# Patient Record
Sex: FEMALE | Race: WHITE | Employment: UNEMPLOYED | URBAN - NONMETROPOLITAN AREA
[De-identification: names, ages, dates, MRNs, and addresses within clinical notes are randomized per-mention and may not be internally consistent; named-entity substitution may affect disease eponyms.]

---

## 2017-02-23 ENCOUNTER — OFFICE VISIT - GICH (OUTPATIENT)
Dept: FAMILY MEDICINE | Facility: OTHER | Age: 57
End: 2017-02-23

## 2017-02-23 ENCOUNTER — HOSPITAL ENCOUNTER (OUTPATIENT)
Dept: RADIOLOGY | Facility: OTHER | Age: 57
End: 2017-02-23
Attending: FAMILY MEDICINE

## 2017-02-23 ENCOUNTER — HISTORY (OUTPATIENT)
Dept: FAMILY MEDICINE | Facility: OTHER | Age: 57
End: 2017-02-23

## 2017-02-23 DIAGNOSIS — J44.9 CHRONIC OBSTRUCTIVE PULMONARY DISEASE (H): ICD-10-CM

## 2017-02-23 DIAGNOSIS — Z72.0 TOBACCO USE: ICD-10-CM

## 2017-02-23 DIAGNOSIS — R05.9 COUGH: ICD-10-CM

## 2017-02-23 DIAGNOSIS — J18.9 PNEUMONIA: ICD-10-CM

## 2017-03-01 ENCOUNTER — COMMUNICATION - GICH (OUTPATIENT)
Dept: FAMILY MEDICINE | Facility: OTHER | Age: 57
End: 2017-03-01

## 2017-03-01 DIAGNOSIS — J44.9 CHRONIC OBSTRUCTIVE PULMONARY DISEASE (H): ICD-10-CM

## 2017-03-01 DIAGNOSIS — M06.9 RHEUMATOID ARTHRITIS (H): ICD-10-CM

## 2017-03-02 ENCOUNTER — COMMUNICATION - GICH (OUTPATIENT)
Dept: FAMILY MEDICINE | Facility: OTHER | Age: 57
End: 2017-03-02

## 2017-03-13 ENCOUNTER — OFFICE VISIT - GICH (OUTPATIENT)
Dept: FAMILY MEDICINE | Facility: OTHER | Age: 57
End: 2017-03-13

## 2017-03-13 ENCOUNTER — HOSPITAL ENCOUNTER (OUTPATIENT)
Dept: RADIOLOGY | Facility: OTHER | Age: 57
End: 2017-03-13
Attending: FAMILY MEDICINE

## 2017-03-13 DIAGNOSIS — R07.89 OTHER CHEST PAIN: ICD-10-CM

## 2017-03-13 DIAGNOSIS — R10.11 RIGHT UPPER QUADRANT PAIN: ICD-10-CM

## 2017-03-13 DIAGNOSIS — K22.70 BARRETT'S ESOPHAGUS WITHOUT DYSPLASIA: ICD-10-CM

## 2017-03-13 LAB
A/G RATIO - HISTORICAL: 1.3 (ref 1–2)
ABSOLUTE BASOPHILS - HISTORICAL: 0 THOU/CU MM
ABSOLUTE EOSINOPHILS - HISTORICAL: 0.1 THOU/CU MM
ABSOLUTE LYMPHOCYTES - HISTORICAL: 1.8 THOU/CU MM (ref 0.9–2.9)
ABSOLUTE MONOCYTES - HISTORICAL: 0.5 THOU/CU MM
ABSOLUTE NEUTROPHILS - HISTORICAL: 3.8 THOU/CU MM (ref 1.7–7)
ALBUMIN SERPL-MCNC: 3.9 G/DL (ref 3.5–5.7)
ALP SERPL-CCNC: 59 IU/L (ref 34–104)
ALT (SGPT) - HISTORICAL: 19 IU/L (ref 7–52)
ANION GAP - HISTORICAL: 9 (ref 5–18)
AST SERPL-CCNC: 17 IU/L (ref 13–39)
BASOPHILS # BLD AUTO: 0.7 %
BILIRUB SERPL-MCNC: 0.3 MG/DL (ref 0.3–1)
BUN SERPL-MCNC: 15 MG/DL (ref 7–25)
BUN/CREAT RATIO - HISTORICAL: 15
CALCIUM SERPL-MCNC: 9.6 MG/DL (ref 8.6–10.3)
CHLORIDE SERPLBLD-SCNC: 106 MMOL/L (ref 98–107)
CO2 SERPL-SCNC: 24 MMOL/L (ref 21–31)
CREAT SERPL-MCNC: 0.99 MG/DL (ref 0.7–1.3)
EOSINOPHIL NFR BLD AUTO: 1.4 %
ERYTHROCYTE [DISTWIDTH] IN BLOOD BY AUTOMATED COUNT: 15.1 % (ref 11.5–15.5)
GFR IF NOT AFRICAN AMERICAN - HISTORICAL: 58 ML/MIN/1.73M2
GLOBULIN - HISTORICAL: 3 G/DL (ref 2–3.7)
GLUCOSE SERPL-MCNC: 108 MG/DL (ref 70–105)
HCT VFR BLD AUTO: 37.6 % (ref 33–51)
HEMOGLOBIN: 11.8 G/DL (ref 12–16)
LYMPHOCYTES NFR BLD AUTO: 28.9 % (ref 20–44)
MCH RBC QN AUTO: 27.8 PG (ref 26–34)
MCHC RBC AUTO-ENTMCNC: 31.4 G/DL (ref 32–36)
MCV RBC AUTO: 89 FL (ref 80–100)
MONOCYTES NFR BLD AUTO: 7.6 %
NEUTROPHILS NFR BLD AUTO: 61.4 % (ref 42–72)
PLATELET # BLD AUTO: 231 THOU/CU MM (ref 140–440)
PMV BLD: 6.1 FL (ref 6.5–11)
POTASSIUM SERPL-SCNC: 3.7 MMOL/L (ref 3.5–5.1)
PROT SERPL-MCNC: 6.9 G/DL (ref 6.4–8.9)
RED BLOOD COUNT - HISTORICAL: 4.25 MIL/CU MM (ref 4–5.2)
SODIUM SERPL-SCNC: 139 MMOL/L (ref 133–143)
WHITE BLOOD COUNT - HISTORICAL: 6.2 THOU/CU MM (ref 4.5–11)

## 2017-03-17 ENCOUNTER — HOSPITAL ENCOUNTER (OUTPATIENT)
Dept: RADIOLOGY | Facility: OTHER | Age: 57
End: 2017-03-17
Attending: FAMILY MEDICINE

## 2017-03-17 ENCOUNTER — AMBULATORY - GICH (OUTPATIENT)
Dept: FAMILY MEDICINE | Facility: OTHER | Age: 57
End: 2017-03-17

## 2017-03-17 ENCOUNTER — HISTORY (OUTPATIENT)
Dept: EMERGENCY MEDICINE | Facility: OTHER | Age: 57
End: 2017-03-17

## 2017-03-17 DIAGNOSIS — R10.11 RIGHT UPPER QUADRANT PAIN: ICD-10-CM

## 2017-03-23 ENCOUNTER — OFFICE VISIT - GICH (OUTPATIENT)
Dept: FAMILY MEDICINE | Facility: OTHER | Age: 57
End: 2017-03-23

## 2017-03-23 ENCOUNTER — HISTORY (OUTPATIENT)
Dept: FAMILY MEDICINE | Facility: OTHER | Age: 57
End: 2017-03-23

## 2017-03-23 DIAGNOSIS — S39.012A STRAIN OF MUSCLE, FASCIA AND TENDON OF LOWER BACK, INITIAL ENCOUNTER: ICD-10-CM

## 2017-03-23 DIAGNOSIS — M54.50 LOW BACK PAIN: ICD-10-CM

## 2017-03-30 ENCOUNTER — COMMUNICATION - GICH (OUTPATIENT)
Dept: FAMILY MEDICINE | Facility: OTHER | Age: 57
End: 2017-03-30

## 2017-03-30 DIAGNOSIS — M54.50 LOW BACK PAIN: ICD-10-CM

## 2017-03-30 DIAGNOSIS — S39.012A STRAIN OF MUSCLE, FASCIA AND TENDON OF LOWER BACK, INITIAL ENCOUNTER: ICD-10-CM

## 2017-04-07 ENCOUNTER — COMMUNICATION - GICH (OUTPATIENT)
Dept: FAMILY MEDICINE | Facility: OTHER | Age: 57
End: 2017-04-07

## 2017-04-07 DIAGNOSIS — S39.012A STRAIN OF MUSCLE, FASCIA AND TENDON OF LOWER BACK, INITIAL ENCOUNTER: ICD-10-CM

## 2017-04-07 DIAGNOSIS — M54.50 LOW BACK PAIN: ICD-10-CM

## 2017-04-11 ENCOUNTER — HISTORY (OUTPATIENT)
Dept: EMERGENCY MEDICINE | Facility: OTHER | Age: 57
End: 2017-04-11

## 2017-04-11 ENCOUNTER — HISTORY (OUTPATIENT)
Dept: FAMILY MEDICINE | Facility: OTHER | Age: 57
End: 2017-04-11

## 2017-04-11 ENCOUNTER — OFFICE VISIT - GICH (OUTPATIENT)
Dept: FAMILY MEDICINE | Facility: OTHER | Age: 57
End: 2017-04-11

## 2017-04-26 ENCOUNTER — AMBULATORY - GICH (OUTPATIENT)
Dept: RADIOLOGY | Facility: OTHER | Age: 57
End: 2017-04-26

## 2017-04-26 DIAGNOSIS — Z12.31 ENCOUNTER FOR SCREENING MAMMOGRAM FOR MALIGNANT NEOPLASM OF BREAST: ICD-10-CM

## 2017-06-06 ENCOUNTER — COMMUNICATION - GICH (OUTPATIENT)
Dept: FAMILY MEDICINE | Facility: OTHER | Age: 57
End: 2017-06-06

## 2017-06-06 DIAGNOSIS — M54.50 LOW BACK PAIN: ICD-10-CM

## 2017-06-06 DIAGNOSIS — S39.012A STRAIN OF MUSCLE, FASCIA AND TENDON OF LOWER BACK, INITIAL ENCOUNTER: ICD-10-CM

## 2017-07-10 ENCOUNTER — AMBULATORY - GICH (OUTPATIENT)
Dept: RADIOLOGY | Facility: OTHER | Age: 57
End: 2017-07-10

## 2017-07-10 DIAGNOSIS — N63.0 BREAST LUMP: ICD-10-CM

## 2017-07-11 ENCOUNTER — HISTORY (OUTPATIENT)
Dept: EMERGENCY MEDICINE | Facility: OTHER | Age: 57
End: 2017-07-11

## 2017-07-12 ENCOUNTER — AMBULATORY - GICH (OUTPATIENT)
Dept: RADIOLOGY | Facility: OTHER | Age: 57
End: 2017-07-12

## 2017-07-12 DIAGNOSIS — N63.0 BREAST LUMP: ICD-10-CM

## 2017-07-13 ENCOUNTER — AMBULATORY - GICH (OUTPATIENT)
Dept: RADIOLOGY | Facility: OTHER | Age: 57
End: 2017-07-13

## 2017-07-13 ENCOUNTER — HISTORY (OUTPATIENT)
Dept: RADIOLOGY | Facility: OTHER | Age: 57
End: 2017-07-13

## 2017-07-13 ENCOUNTER — HOSPITAL ENCOUNTER (OUTPATIENT)
Dept: RADIOLOGY | Facility: OTHER | Age: 57
End: 2017-07-13

## 2017-07-13 DIAGNOSIS — N63.0 BREAST LUMP: ICD-10-CM

## 2017-07-21 ENCOUNTER — HISTORY (OUTPATIENT)
Dept: OBGYN | Facility: OTHER | Age: 57
End: 2017-07-21

## 2017-07-21 ENCOUNTER — OFFICE VISIT - GICH (OUTPATIENT)
Dept: FAMILY MEDICINE | Facility: OTHER | Age: 57
End: 2017-07-21

## 2017-07-21 ENCOUNTER — HOSPITAL ENCOUNTER (OUTPATIENT)
Dept: RADIOLOGY | Facility: OTHER | Age: 57
End: 2017-07-21
Attending: NURSE PRACTITIONER

## 2017-07-21 ENCOUNTER — OFFICE VISIT - GICH (OUTPATIENT)
Dept: OBGYN | Facility: OTHER | Age: 57
End: 2017-07-21

## 2017-07-21 DIAGNOSIS — M25.551 PAIN IN RIGHT HIP: ICD-10-CM

## 2017-07-21 DIAGNOSIS — S81.811D LACERATION WITHOUT FOREIGN BODY, RIGHT LOWER LEG, SUBSEQUENT ENCOUNTER: ICD-10-CM

## 2017-07-21 DIAGNOSIS — N95.2 POSTMENOPAUSAL ATROPHIC VAGINITIS: ICD-10-CM

## 2017-08-22 ENCOUNTER — AMBULATORY - GICH (OUTPATIENT)
Dept: RADIOLOGY | Facility: OTHER | Age: 57
End: 2017-08-22

## 2017-08-22 DIAGNOSIS — R92.8 OTHER ABNORMAL AND INCONCLUSIVE FINDINGS ON DIAGNOSTIC IMAGING OF BREAST: ICD-10-CM

## 2017-08-24 ENCOUNTER — AMBULATORY - GICH (OUTPATIENT)
Dept: RADIOLOGY | Facility: OTHER | Age: 57
End: 2017-08-24

## 2017-08-24 DIAGNOSIS — M25.552 PAIN IN LEFT HIP: ICD-10-CM

## 2017-08-24 DIAGNOSIS — M25.551 PAIN IN RIGHT HIP: ICD-10-CM

## 2017-08-24 DIAGNOSIS — G89.29 OTHER CHRONIC PAIN: ICD-10-CM

## 2017-08-24 DIAGNOSIS — M16.0 PRIMARY OSTEOARTHRITIS OF BOTH HIPS: ICD-10-CM

## 2017-10-03 ENCOUNTER — HOSPITAL ENCOUNTER (OUTPATIENT)
Dept: RADIOLOGY | Facility: OTHER | Age: 57
End: 2017-10-03

## 2017-10-03 DIAGNOSIS — M25.551 PAIN IN RIGHT HIP: ICD-10-CM

## 2017-10-03 DIAGNOSIS — G89.29 OTHER CHRONIC PAIN: ICD-10-CM

## 2017-10-03 DIAGNOSIS — R92.8 OTHER ABNORMAL AND INCONCLUSIVE FINDINGS ON DIAGNOSTIC IMAGING OF BREAST: ICD-10-CM

## 2017-10-03 DIAGNOSIS — M16.0 PRIMARY OSTEOARTHRITIS OF BOTH HIPS: ICD-10-CM

## 2017-10-17 ENCOUNTER — HOSPITAL ENCOUNTER (OUTPATIENT)
Dept: RADIOLOGY | Facility: OTHER | Age: 57
End: 2017-10-17

## 2017-10-17 DIAGNOSIS — G89.29 OTHER CHRONIC PAIN: ICD-10-CM

## 2017-10-17 DIAGNOSIS — M25.552 PAIN IN LEFT HIP: ICD-10-CM

## 2017-10-17 DIAGNOSIS — M16.0 PRIMARY OSTEOARTHRITIS OF BOTH HIPS: ICD-10-CM

## 2017-10-22 ENCOUNTER — HEALTH MAINTENANCE LETTER (OUTPATIENT)
Age: 57
End: 2017-10-22
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
## 2017-12-27 NOTE — PROGRESS NOTES
Patient Information     Patient Name MRN Sex Deidre Jimenez [id number] Female 1960      [facility] at 10/17/2017  2:21 PM     Author:  Bet[facility]T. [facility] Service:  (none) Author Type:  RadTech - Registered Radiologic Technologist     Filed:  10/17/2017  2:21 PM Date of Service:  10/17/2017  2:21 PM Status:  Signed     :  Betty Meza R.T. ([facility]) ([facility] - Registered Radiologic Technologist)            [place] Protocol    A. Pre-procedure verification complete yes  1-relevant information / documentation available, reviewed and properly matched to the patient; 2-consent accurate and complete, 3-equipment and supplies available    B. Site marking complete Yes  Site marked if not in continuous attendance with patient    C. TIME OUT completed yes  Time Out was conducted just prior to starting procedure to verify the eight required elements: 1-patient identity, 2-consent accurate and complete, 3-position, 4-correct side/site marked (if applicable), 5-procedure, 6-relevant images / results properly labeled and displayed (if applicable), 7-antibiotics / irrigation fluids (if applicable), 8-safety precautions.

## 2017-12-27 NOTE — PROGRESS NOTES
Patient Information     Patient Name MRN Sex Deidre Jimenez [id number] Female 1960      Progress Notes by Betty Davis R.T. ([facility]) at 10/3/2017 12:45 PM     Author:  [facility] Service:  (none) Author Type:  RadTech - Registered Radiologic Technologist     Filed:  10/3/2017 12:45 PM Date of Service:  10/3/2017 12:45 PM Status:  Signed     :  Bet[facility]. [facility] ([facility] - Registered Radiologic Technologist)            [place] Protocol    A. Pre-procedure verification complete yes  1-relevant information / documentation available, reviewed and properly matched to the patient; 2-consent accurate and complete, 3-equipment and supplies available    B. Site marking complete Yes  Site marked if not in continuous attendance with patient    C. TIME OUT completed yes  Time Out was conducted just prior to starting procedure to verify the eight required elements: 1-patient identity, 2-consent accurate and complete, 3-position, 4-correct side/site marked (if applicable), 5-procedure, 6-relevant images / results properly labeled and displayed (if applicable), 7-antibiotics / irrigation fluids (if applicable), 8-safety precautions.

## 2017-12-28 NOTE — PROGRESS NOTES
Patient Information     Patient Name MRN Deidre Echeverria [id number] Female 1960      Progress Notes by Betty Meza R.T. ([facility]) at 10/17/2017  2:21 PM     Author:  Betty Meza R.T. ([facility]) Service:  (none) Author Type:  RadTech - Registered Radiologic Technologist     Filed:  10/17/2017  2:21 PM Date of Service:  10/17/2017  2:21 PM Status:  Signed     :  Betty Meza R.T. ([facility]) ([facility] - Registered Radiologic Technologist)            RECOVERY TIME  You may experience numbness and/or relief of your pain for up to 4-6 hours after the injection.  Your usual symptoms may return the night of the procedure and may possible be more severe than usual a day or two following.  Please keep track of your pain over the next several days and report how long the relief lasts to the doctor who referred you for this procedure.    The beneficial effects of the steroids usually require 2 to 3 days to take effect, buy may take as long as 5 to 7 days.  If there is no change in the pain, then investigation can be focused on other possible sources of your pain.  In either case, the information is useful to the doctor who referred you for this procedure.    POSSIBLE SIDE EFFECTS  Facial flushing (redness), occasional low grade fevers of 99.5F or less, hiccups, insomnia, headaches, increased heart rate, abdominal cramping, and/or a bloating feeling are side effects of the steroid medications and will go away 3 to 4 days after the injection.    Diabetic Patients  The steroids you have received may significantly increase your blood sugar levels.  Monitor your blood sugar level closely (4-6 times per day) for a period of 4 days or until your blood sugar level normalizes.  If your blood sugar level elevates significantly or you experience confusion, dizziness, sweating, please notify our primary physician and make him/her aware that you have received steroids.
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
## 2017-12-28 NOTE — TELEPHONE ENCOUNTER
Patient Information     Patient Name MRN Deidre Echeverria [id number] Female 1960      Telephone Encounter by Corazon Meza RN at 2017 10:27 AM     Author:  Corazon Meza RN Service:  (none) Author Type:  NURS- Registered Nurse     Filed:  2017 10:28 AM Encounter Date:  2017 Status:  Signed     :  Corazon Meza RN (NURS- Registered Nurse)            This is a Refill request from: [facility]   Name of Medication: Flexeril  Quantity requested: 30  Last fill date: 3/30/17  Due for refill: yes  Last visit with USHA CORONEL was on: 2017 in [facility]  PCP:  No Pcp  Controlled Substance Agreement:  n/a   Diagnosis r/t this medication request: low back pain    Unable to complete prescription refill per RN Medication Refill Policy.................... Corazon Meaz RN ....................  2017   10:28 AM

## 2017-12-28 NOTE — PROGRESS NOTES
Patient Information     Patient Name MRN Sex Deidre Jimenez [id number] Female 1960      Progress Notes by Betty Davis R.T. ([facility]) at 10/3/2017 12:45 PM     Author:  [facility] Service:  (none) Author Type:  RadTech - Registered Radiologic Technologist     Filed:  10/3/2017 12:45 PM Date of Service:  10/3/2017 12:45 PM Status:  Signed     :  Betty Davis R.T. ([facility]) ([facility] - Registered Radiologic Technologist)            Falls Risk Criteria:    Age 65 and older or under age 4        Sensory deficits    Poor vision    Use of ambulatory aides    Impaired judgment    Unable to walk independently    Meets High Risk criteria for falls:  no

## 2017-12-28 NOTE — PROGRESS NOTES
Patient Information     Patient Name MRN Sex Deidre Jimenez [id number] Female 1960      Progress Notes by Bell Carnes R.T. ([facility]) at 2017  2:43 PM     Author:  Bell Carnes R.T. ([facility]) Service:  (none) Author Type:  (none)     Filed:  2017  2:43 PM Date of Service:  2017  2:43 PM Status:  Signed     :  Bell Carnes R.T. ([facility]) ([facility] - Registered Radiologic Technologist)            Falls Risk Criteria:    Age 65 and older or under age 4        Sensory deficits    Poor vision    Use of ambulatory aides    Impaired judgment    Unable to walk independently    Meets High Risk criteria for falls:  no

## 2017-12-28 NOTE — PROGRESS NOTES
Patient Information     Patient Name MRN Deidre Echeverria [id number] Female 1960      Progress Notes by Betty Davis R.T. ([facility]) at 10/3/2017 12:45 PM     Author:  Betty Davis R.T. ([facility]) Service:  (none) Author Type:  RadTech - Registered Radiologic Technologist     Filed:  10/3/2017 12:45 PM Date of Service:  10/3/2017 12:45 PM Status:  Signed     :  Betty Davis R.T. ([facility]) ([facility] - Registered Radiologic Technologist)            RECOVERY TIME  You may experience numbness and/or relief of your pain for up to 4-6 hours after the injection.  Your usual symptoms may return the night of the procedure and may possible be more severe than usual a day or two following.  Please keep track of your pain over the next several days and report how long the relief lasts to the doctor who referred you for this procedure.    The beneficial effects of the steroids usually require 2 to 3 days to take effect, buy may take as long as 5 to 7 days.  If there is no change in the pain, then investigation can be focused on other possible sources of your pain.  In either case, the information is useful to the doctor who referred you for this procedure.    POSSIBLE SIDE EFFECTS  Facial flushing (redness), occasional low grade fevers of 99.5F or less, hiccups, insomnia, headaches, increased heart rate, abdominal cramping, and/or a bloating feeling are side effects of the steroid medications and will go away 3 to 4 days after the injection.    Diabetic Patients  The steroids you have received may significantly increase your blood sugar levels.  Monitor your blood sugar level closely (4-6 times per day) for a period of 4 days or until your blood sugar level normalizes.  If your blood sugar level elevates significantly or you experience confusion, dizziness, sweating, please notify our primary physician and make him/her aware that you have received steroids.

## 2017-12-28 NOTE — TELEPHONE ENCOUNTER
Patient Information     Patient Name MRN Deidre Echeverria [id number] Female 1960      Telephone Encounter by Kennedy Tilley MD at 2017  8:11 PM     Author:  Kennedy Tilley MD Service:  (none) Author Type:  Physician     Filed:  2017  8:11 PM Encounter Date:  2017 Status:  Signed     :  Kennedy Tilley MD (Physician)            Patient should be seen for follow-up

## 2017-12-28 NOTE — ADDENDUM NOTE
Patient Information     Patient Name MRN Sex Deidre Jimenez [id number] Female 1960      Addendum Note by Corazon Funez RN at 2017  3:08 PM     Author:  Corazon Funez RN Service:  (none) Author Type:  NURS- Registered Nurse     Filed:  2017  3:08 PM Encounter Date:  2017 Status:  Signed     :  Corazon Funez RN (NURS- Registered Nurse)       Addended by: CORAZON FUNEZ on: 2017 03:08 PM        Modules accepted: Orders

## 2017-12-28 NOTE — PROGRESS NOTES
Patient Information     Patient Name MRN Sex Deidre Jimenez [id number] Female 1960      Progress Notes by Betty Meza R.T. ([facility]) at 10/17/2017  2:21 PM     Author:  [facility]. [facility] Service:  (none) Author Type:  [facility] hospital - Registered Radiologic Technologist     Filed:  10/17/2017  2:21 PM Date of Service:  10/17/2017  2:21 PM Status:  Signed     :  Betty Meza R.T. ([facility]) ([facility] hospital - Registered Radiologic Technologist)            Falls Risk Criteria:    Age 65 and older or under age 4        Sensory deficits    Poor vision    Use of ambulatory aides    Impaired judgment    Unable to walk independently    Meets High Risk criteria for falls:  no

## 2017-12-29 NOTE — PATIENT INSTRUCTIONS
Patient Information     Patient Name MRN Deidre Echeverria [id number] Female 1960      Patient Instructions by Caroline Munoz NP at 2017  3:15 PM     Author:  Caroline Munoz NP Service:  (none) Author Type:  PHYS- Nurse Practitioner     Filed:  2017  4:26 PM Encounter Date:  2017 Status:  Signed     :  Caroline Munoz NP (PHYS- Nurse Practitioner)            Please follow up with primary care for ongoing management of leg pain  I will call with radiology report if any concerns    Sutures removed

## 2017-12-30 NOTE — NURSING NOTE
Patient Information     Patient Name MRN Sex Deidre Jimenez [id number] Female 1960      Nursing Note by Ashley Gayle at 2017  2:30 PM     Author:  Ashley Gayle Service:  (none) Author Type:  NURS- Registered Nurse     Filed:  2017  3:58 PM Encounter Date:  2017 Status:  Signed     :  Ashley Gayle (NURS- Registered Nurse)            Patient is here bilateral lower abdominal pain and to have Px.  2 week tested positive for MRSA.  Ashley Gayle RN .............. 2017  2:22 PM
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
## 2018-01-03 NOTE — NURSING NOTE
Patient Information     Patient Name MRN Deidre Echeverria [id number] Female 1960      Nursing Note by Hillary Tony at 3/13/2017  3:30 PM     Author:  Hillary Tony Service:  (none) Author Type:  (none)     Filed:  3/13/2017  3:53 PM Encounter Date:  3/13/2017 Status:  Signed     :  Hillary Tony            Patient presents today to follow up from recent pneumonia. She states she is experiencing shortness of breath, right lower lobe pain, and fatigue.  Hillary Tony LPN ....................  3/13/2017   3:41 PM

## 2018-01-03 NOTE — TELEPHONE ENCOUNTER
Patient Information     Patient Name MRN Deidre Echeverria [id number] Female 1960      Telephone Encounter by aMty Mercado at 3/2/2017  3:23 PM     Author:  Maty Mercado Service:  (none) Author Type:  (none)     Filed:  3/2/2017  3:23 PM Encounter Date:  3/2/2017 Status:  Signed     :  Maty Mercado            Left message to call back  ....................  3/2/2017   3:23 PM  Maty Mercado LPN........................3/2/2017  3:23 PM

## 2018-01-03 NOTE — TELEPHONE ENCOUNTER
Patient Information     Patient Name MRN Deidre Echeverria [id number] Female 1960      Telephone Encounter by Kayce Roberto at 3/3/2017  7:52 AM     Author:  Kayce Roberto Service:  (none) Author Type:  (none)     Filed:  3/3/2017  7:53 AM Encounter Date:  3/2/2017 Status:  Signed     :  Kayce Roberto            Unable to reach patient or leave a message this morning. Kayce Roberto LPN .......................3/3/2017  7:52 AM

## 2018-01-03 NOTE — TELEPHONE ENCOUNTER
Patient Information     Patient Name MRN Deidre Echeverria [id number] Female 1960      Telephone Encounter by Savannah Shell at 3/6/2017 12:32 PM     Author:  Savannah Shell Service:  (none) Author Type:  (none)     Filed:  3/6/2017 12:33 PM Encounter Date:  3/2/2017 Status:  Signed     :  Savannah Shell            Left message to call back.   Savannah Shell ....................  3/6/2017   12:32 PM

## 2018-01-03 NOTE — TELEPHONE ENCOUNTER
Patient Information     Patient Name MRN Deidre Echeverria [id number] Female 1960      Telephone Encounter by Kayce Roberto at 3/8/2017 10:55 AM     Author:  Kayce Roberto Service:  (none) Author Type:  (none)     Filed:  3/8/2017 10:56 AM Encounter Date:  3/2/2017 Status:  Signed     :  Kayce Roberto            Several unsuccessful attempts to reach patient will close note at this time. Kayce Roberto LPN .......................3/8/2017  10:56 AM

## 2018-01-03 NOTE — TELEPHONE ENCOUNTER
Patient Information     Patient Name MRN Deidre Echeverria [id number] Female 1960      Telephone Encounter by Corazon Meza RN at 3/1/2017  2:28 PM     Author:  Corazon Meza RN Service:  (none) Author Type:  NURS- Registered Nurse     Filed:  3/1/2017  2:33 PM Encounter Date:  3/1/2017 Status:  Signed     :  Corazon Meza RN (NURS- Registered Nurse)            Patient is requesting order for Tylenol. She needs orders sent to [facility] due to being at [facility]. Please sign orders if appropriate.  Corazon Meza RN........3/1/2017 2:31 PM      Refill for Prednisone refused. Filled 17 and patient is to follow up after treatment is completed.  Unable to complete prescription refill per RN Medication Refill Policy.................... Corazon Meza RN ....................  3/1/2017   2:32 PM

## 2018-01-03 NOTE — PROGRESS NOTES
Patient Information     Patient Name MRN Deidre Echeverria [id number] Female 1960      Progress Notes by Kennedy Tilley MD at 2017  3:30 PM     Author:  Kennedy Tilley MD Service:  (none) Author Type:  Physician     Filed:  2017  7:21 PM Encounter Date:  2017 Status:  Signed     :  Kenndey Tilley MD (Physician)            SUBJECTIVE:    Deidre Braga is a 57 y.o. female who presents for possible pneumonia    HPI Comments: Patient arrives here because of continued cough. She recently arrived at [facility] for alcoholism. Just prior to coming in she was on a course of 60 mg of prednisone and doxycycline. She reports some good improvement initially but the cough has recurred. Treatment was done in [place]. She recently arrived here. She's noticed increasing shortness of breath. Increasing cough and production. She is on albuterol and Symbicort and for breathing. She has a long history of tobacco abuse but quit about 10 days ago. No fevers or chills. She has trouble with laying down. Reports the cough is starting to give her some chest tightness.      Allergies      Allergen   Reactions     Baclofen  Confusion     Sleep walks      Gabapentin  Tardive Dyskinesia     Sulfa (Sulfonamide Antibiotics)  Anaphylaxis     Tramadol       bloating    ,   Family History       Problem   Relation Age of Onset     Stroke  Father      Heart Disease  Father      stents       Other  Mother      , Nehemias-Danlos       Cancer  Brother      skin     ,   Current Outpatient Prescriptions on File Prior to Visit       Medication  Sig Dispense Refill     acetaminophen (TYLENOL EXTRA STRGTH) 500 mg tablet Take 500-1,000 mg by mouth every 6 hours if needed. Max acetaminophen dose: 4000mg in 24 hrs.   Indications: BACK PAIN, joint pain       albuterol HFA (VENTOLIN HFA) 90 mcg/actuation inhaler Inhale 2 Puffs by mouth 4 times daily if needed.       alendronate (FOSAMAX)  35 mg tablet Take 35 mg by mouth once a week in the morning. Take on empty stomach with full glass of water. Do not lie down for 1 hr.       budesonide-formoterol (SYMBICORT) 80-4.5 mcg/actuation (80-4.5 mcg each actuation) inhaler Inhale 2 Puffs by mouth 2 times daily.       calcium with vitamin D3 (OS- + D) tablet Take 1 tablet by mouth once daily with a meal. 30 tablet 0     CYANOCOBALAMIN, VITAMIN B-12, (VITAMIN B-12 ORAL) Take 1,000 mcg by mouth once daily.       DULoxetine (CYMBALTA) 60 mg Delayed-release capsule Take 1 capsule by mouth 2 times daily. 60 capsule 0     etodolac (LODINE) 300 mg capsule Take 1 capsule by mouth 2 times daily with meals. 60 capsule 0     hydrOXYzine pamoate (VISTARIL) 50 mg capsule Take 50 mg by mouth 3 times daily if needed for Anxiety.       magnesium oxide (MAG-) 400 mg tablet Take 1 tablet by mouth once daily. 30 tablet 0     naltrexone (REVIA) 50 mg tablet Take 1 tablet by mouth once daily. 30 tablet 0     pantoprazole (PROTONIX) 20 mg tablet Take 1 tablet by mouth 2 times daily before meals. 60 tablet 0     pregabalin (LYRICA) 150 mg capsule Take 1 capsule by mouth 2 times daily. 60 capsule 0     umeclidinium (INCRUSE ELLIPTA) 62.5 mcg/actuation powder for inhalation Inhale 1 Puff by mouth once daily.  0     No current facility-administered medications on file prior to visit.    ,   Past Surgical History       Procedure   Laterality Date     Broken shoulder w/ repair        Kj and bso        All top teeth removed        Lumbar fusion   2012     L4-5 in [place]     ,   Social History        Substance Use Topics          Smoking status:   Former Smoker      Packs/day:  0.50      Years:  40.00      Types:  Cigarettes      Quit date:  2/10/2017      Smokeless tobacco:   Never Used      Alcohol use   0.0 oz/week     0 Standard drinks or equivalent per week        Comment: binge when drinks, but was in tx 02/22/17 out of treatment        and   Social History         Substance Use Topics          Smoking status:   Former Smoker      Packs/day:  0.50      Years:  40.00      Types:  Cigarettes      Quit date:  2/10/2017      Smokeless tobacco:   Never Used      Alcohol use   0.0 oz/week     0 Standard drinks or equivalent per week        Comment: binge when drinks, but was in tx 02/22/17 out of treatment           REVIEW OF SYSTEMS:  ROS    OBJECTIVE:  Pulse (!) 110  Temp 96.1  F (35.6  C) (Temporal)  Resp (!) 96  Wt 71.4 kg (157 lb 6.4 oz)  BMI 27.02 kg/m2    EXAM:   Physical Exam   Constitutional:   Patient appears much older than stated age   HENT:   Head: Normocephalic.   Right Ear: External ear normal.   Left Ear: External ear normal.   Mouth/Throat: Oropharynx is clear and moist.   Eyes: Pupils are equal, round, and reactive to light.   Cardiovascular: Normal rate and regular rhythm.    Distant sounding   Pulmonary/Chest: Effort normal. No respiratory distress. She has wheezes.   Increase AP diameter. Occasional expiratory wheezes. Decreased breath sounds throughout but breath sounds are equal bilateral.   Neurological: She is alert.   Skin: Skin is warm.   Psychiatric: Affect normal.   Chest x-ray reviewed with radiology shows a infiltrate in her left mid lung.    ASSESSMENT/PLAN:    ICD-10-CM    1. COPD, moderate (HC) J44.9 XR CHEST 2 VIEWS PA AND LATERAL      predniSONE (DELTASONE) 20 mg tablet      azithromycin (ZITHROMAX) 250 mg tablet   2. Cough R05 XR CHEST 2 VIEWS PA AND LATERAL   3. Pneumonia, community acquired J18.9 azithromycin (ZITHROMAX) 250 mg tablet   4. Tobacco abuse Z72.0         Plan:  COPD fairly severe. With a infiltrate. We'll start the patient on Zithromax. Then restart prednisone taper. Also discussed with radiology that the lesion on her chest; be a lung cancer. I discussed this with the patient infiltrate versus lung cancer especially in light of her history. Encouraged patient to refrain from smoking. She needs to follow-up with a repeat chest  x-ray in 2-3 weeks. She knows how important it is to follow up and confirm resolution of the infiltrate. If lesion persists we'll proceed with a CT scan.

## 2018-01-03 NOTE — TELEPHONE ENCOUNTER
Patient Information     Patient Name MRDeidre Ingram [id number] Female 1960      Telephone Encounter by Kayce Roberto at 3/3/2017 10:21 AM     Author:  Kayce Roberto Service:  (none) Author Type:  (none)     Filed:  3/3/2017 10:24 AM Encounter Date:  3/2/2017 Status:  Signed     :  Kayce Roberto            Spoke with patient she finished her prednisione 3 days ago she feels she was taking it wrong to start the taper. She is wondering about a refill on the prednisione, she states she is coughing up blood. She has a follow up on the , encouraged her to come into the clinic today she did not have a . Kayce Roberto LPN .......................3/3/2017  10:23 AM

## 2018-01-03 NOTE — NURSING NOTE
Patient Information     Patient Name MRN Deidre Echeverria [id number] Female 1960      Nursing Note by Kayce Roberto at 2017  3:30 PM     Author:  Kayce Roberto Service:  (none) Author Type:  (none)     Filed:  2017  4:19 PM Encounter Date:  2017 Status:  Signed     :  Kayce Roberto            Patient here for questionable pnuemonia, she was on antibiotic and prednisone 3 weeks ago at [facility] for alcohol treatment. Kayce Roberto LPN .......................2017  3:47 PM

## 2018-01-04 NOTE — PROGRESS NOTES
Patient Information     Patient Name MRN Sex Deidre Jimenez [id number] Female 1960      Progress Notes by Kennedy Tilley MD at 3/13/2017  3:30 PM     Author:  Kennedy Tilley MD  Service:  (none) Author Type:  Physician     Filed:  3/22/2017  2:05 PM  Encounter Date:  3/13/2017 Status:  Addendum     :  Kennedy Tilley MD (Physician)        Related Notes: Original Note by Kennedy Tilley MD (Physician) filed at 3/14/2017 11:36 AM            SUBJECTIVE:    Deidre Braga is a 57 y.o. female who presents for follow-up chest pain    HPI Comments: Patient arrives here for follow-up right chest pain. Pain has persisted. An not improved. Recently treated for pneumonia and follow-up chest x-ray was fine. She also complains of bloating. But there is been no change in bowel or bladder. Chest pain is mainly located on the right side. Patient reports it seems to worsen with getting up. She also reports food seems to come up at times . She is complaining of abdominal pain. Patient does have a history of Marroquin's esophagitis. She was scoped at an outside facility and I do not see any scan notes. She also reports colonoscopy 1 year ago at an outside facility.      Allergies      Allergen   Reactions     Baclofen  Confusion     Sleep walks      Gabapentin  Tardive Dyskinesia     Sulfa (Sulfonamide Antibiotics)  Anaphylaxis     Tramadol       bloating    ,   Family History       Problem   Relation Age of Onset     Stroke  Father      Heart Disease  Father      stents       Other  Mother      , Nehemias-Danlos       Cancer  Brother      skin     ,   Current Outpatient Prescriptions on File Prior to Visit       Medication  Sig Dispense Refill     acetaminophen (TYLENOL EXTRA STRGTH) 500 mg tablet Take 1-2 tablets by mouth every 6 hours if needed. Max acetaminophen dose: 4000mg in 24 hrs.   Indications: Back Pain, joint pain 100 tablet 2     albuterol HFA (VENTOLIN HFA) 90 mcg/actuation inhaler  Inhale 2 Puffs by mouth 4 times daily if needed.       alendronate (FOSAMAX) 35 mg tablet Take 35 mg by mouth once a week in the morning. Take on empty stomach with full glass of water. Do not lie down for 1 hr.       budesonide-formoterol (SYMBICORT) 80-4.5 mcg/actuation (80-4.5 mcg each actuation) inhaler Inhale 2 Puffs by mouth 2 times daily.       calcium with vitamin D3 (OS- + D) tablet Take 1 tablet by mouth once daily with a meal. 30 tablet 0     CYANOCOBALAMIN, VITAMIN B-12, (VITAMIN B-12 ORAL) Take 1,000 mcg by mouth once daily.       DULoxetine (CYMBALTA) 60 mg Delayed-release capsule Take 1 capsule by mouth 2 times daily. 60 capsule 0     etodolac (LODINE) 300 mg capsule Take 1 capsule by mouth 2 times daily with meals. 60 capsule 0     hydrOXYzine pamoate (VISTARIL) 50 mg capsule Take 50 mg by mouth 3 times daily if needed for Anxiety.       magnesium oxide (MAG-) 400 mg tablet Take 1 tablet by mouth once daily. 30 tablet 0     naltrexone (REVIA) 50 mg tablet Take 1 tablet by mouth once daily. 30 tablet 0     pregabalin (LYRICA) 150 mg capsule Take 1 capsule by mouth 2 times daily. 60 capsule 0     umeclidinium (INCRUSE ELLIPTA) 62.5 mcg/actuation powder for inhalation Inhale 1 Puff by mouth once daily.  0     No current facility-administered medications on file prior to visit.    ,   Past Surgical History       Procedure   Laterality Date     Broken shoulder w/ repair        Kj and bso        All top teeth removed        Lumbar fusion   2012     L4-5 in [place]     ,   Social History        Substance Use Topics          Smoking status:   Former Smoker      Packs/day:  0.50      Years:  40.00      Types:  Cigarettes      Quit date:  2/10/2017      Smokeless tobacco:   Never Used      Alcohol use   0.0 oz/week     0 Standard drinks or equivalent per week        Comment: binge when drinks, but was in tx 02/22/17 out of treatment        and   Social History        Substance Use Topics           Smoking status:   Former Smoker      Packs/day:  0.50      Years:  40.00      Types:  Cigarettes      Quit date:  2/10/2017      Smokeless tobacco:   Never Used      Alcohol use   0.0 oz/week     0 Standard drinks or equivalent per week        Comment: binge when drinks, but was in tx 02/22/17 out of treatment           REVIEW OF SYSTEMS:  ROS    OBJECTIVE:  /76  Temp 97.3  F (36.3  C) (Temporal)  Resp 20  Wt 70.9 kg (156 lb 6.4 oz)  Breastfeeding? No  BMI 26.85 kg/m2    EXAM:   Physical Exam   Constitutional: She is well-developed, well-nourished, and in no distress.   HENT:   Head: Normocephalic and atraumatic.   Right Ear: External ear normal.   Left Ear: External ear normal.   Mouth/Throat: No oropharyngeal exudate.   Eyes: Conjunctivae and EOM are normal. Pupils are equal, round, and reactive to light.   Cardiovascular: Normal rate and regular rhythm.    Pulmonary/Chest: She has rales (ccasional).   Abdominal:   Patient is quite tender in the right upper quadrant and epigastric area   Musculoskeletal: Normal range of motion.   Neurological: She is alert.   Skin: Skin is warm.   Psychiatric: Affect normal.     chest x-ray film reviewed with radiology. The infiltrate has cleared up  Results for orders placed or performed in visit on 03/13/17       COMP METABOLIC PANEL       Result  Value Ref Range Status    SODIUM 139 133 - 143 mmol/L Final    POTASSIUM 3.7 3.5 - 5.1 mmol/L Final    CHLORIDE 106 98 - 107 mmol/L Final    CO2,TOTAL 24 21 - 31 mmol/L Final    ANION GAP 9 5 - 18                 Final    GLUCOSE 108 (H) 70 - 105 mg/dL Final    CALCIUM 9.6 8.6 - 10.3 mg/dL Final    BUN 15 7 - 25 mg/dL Final    CREATININE 0.99 0.70 - 1.30 mg/dL Final    BUN/CREAT RATIO           15                 Final    GFR if African American >60 >60 ml/min/1.73m2 Final    GFR if not African American 58 (L) >60 ml/min/1.73m2 Final    ALBUMIN 3.9 3.5 - 5.7 g/dL Final    PROTEIN,TOTAL 6.9 6.4 - 8.9 g/dL Final    GLOBULIN                   3.0 2.0 - 3.7 g/dL Final    A/G RATIO 1.3 1.0 - 2.0                 Final    BILIRUBIN,TOTAL 0.3 0.3 - 1.0 mg/dL Final    ALK PHOSPHATASE 59 34 - 104 IU/L Final    ALT (SGPT) 19 7 - 52 IU/L Final    AST (SGOT) 17 13 - 39 IU/L Final   CBC WITH AUTO DIFFERENTIAL       Result  Value Ref Range Status    WHITE BLOOD COUNT         6.2 4.5 - 11.0 thou/cu mm Final    RED BLOOD COUNT           4.25 4.00 - 5.20 mil/cu mm Final    HEMOGLOBIN                11.8 (L) 12.0 - 16.0 g/dL Final    HEMATOCRIT                37.6 33.0 - 51.0 % Final    MCV                       89 80 - 100 fL Final    MCH                       27.8 26.0 - 34.0 pg Final    MCHC                      31.4 (L) 32.0 - 36.0 g/dL Final    RDW                       15.1 11.5 - 15.5 % Final    PLATELET COUNT            231 140 - 440 thou/cu mm Final    MPV                       6.1 (L) 6.5 - 11.0 fL Final    NEUTROPHILS               61.4 42.0 - 72.0 % Final    LYMPHOCYTES               28.9 20.0 - 44.0 % Final    MONOCYTES                 7.6 <12.0 % Final    EOSINOPHILS               1.4 <8.0 % Final    BASOPHILS                 0.7 <3.0 % Final    ABSOLUTE NEUTROPHILS      3.8 1.7 - 7.0 thou/cu mm Final    ABSOLUTE LYMPHOCYTES      1.8 0.9 - 2.9 thou/cu mm Final    ABSOLUTE MONOCYTES        0.5 <0.9 thou/cu mm Final    ABSOLUTE EOSINOPHILS      0.1 <0.5 thou/cu mm Final    ABSOLUTE BASOPHILS        0.0 <0.3 thou/cu mm Final   labs reviewed  ASSESSMENT/PLAN:    ICD-10-CM    1. Other chest pain R07.89 XR CHEST 2 VIEWS PA AND LATERAL   2. Right upper quadrant abdominal pain R10.11 CBC AND DIFFERENTIAL      COMP METABOLIC PANEL      CBC AND DIFFERENTIAL      COMP METABOLIC PANEL      CBC WITH AUTO DIFFERENTIAL      CANCELED: US ABDOMEN COMPLETE   3. Marroquin's esophagus without dysplasia K22.70 omeprazole (PRILOSEC) 20 mg Delayed-Release capsule        Plan:  Advised the patient to follow up after ultrasound. Further imaging depending on the  "ultrasound might include endoscopy and CT scan of either the chest or abdomen. We'll also attempt to get notes from her endoscopy and colonoscopy. She does indicate that she was scheduled for a ultrasound because of \"a societies\". Chest pain could be related to cholecystitis. Doubt cardiac in origin.         "
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
## 2018-01-04 NOTE — TELEPHONE ENCOUNTER
Patient Information     Patient Name MRN Deidre Echeverria [id number] Female 1960      Telephone Encounter by Corazon Meza RN at 2017  8:04 AM     Author:  Corazon Meza RN Service:  (none) Author Type:  NURS- Registered Nurse     Filed:  2017  8:11 AM Encounter Date:  2017 Status:  Signed     :  Corazon Meza RN (NURS- Registered Nurse)            Medication no longer on active medication list. Please review and sign if appropriate.      This is a Refill request from: [facility]   Name of Medication: Ibuprofen  Quantity requested: 60  Last fill date: 3/17/17  Due for refill: DISCONTINUED 3/23/17  Last visit with USHA CORONEL was on: 2017 in [facility]  PCP:  No Pcp  Controlled Substance Agreement:  n/a   Diagnosis r/t this medication request: low back pain, lumbar strain     Unable to complete prescription refill per RN Medication Refill Policy.................... Corazon Meza RN ....................  2017   8:06 AM

## 2018-01-04 NOTE — NURSING NOTE
Patient Information     Patient Name MRN Sex Deidre Jimenez [id number] Female 1960      Nursing Note by Kayce Roberto at 3/23/2017  1:00 PM     Author:  Kayce Roberto Service:  (none) Author Type:  (none)     Filed:  3/23/2017 12:44 PM Encounter Date:  3/23/2017 Status:  Signed     :  Kayce Roberto            Patient here for follow up from ED on 2017 for back pain, compression fractures X7. Kayce Roberto LPN .......................3/23/2017  12:41 PM

## 2018-01-04 NOTE — PROGRESS NOTES
Patient Information     Patient Name MRN Deidre Echeverria [id number] Female 1960      Progress Notes by Kennedy Tilley MD at 3/23/2017  1:00 PM     Author:  Kennedy Tilley MD Service:  (none) Author Type:  Physician     Filed:  3/24/2017  7:50 AM Encounter Date:  3/23/2017 Status:  Signed     :  Kennedy Tilley MD (Physician)            SUBJECTIVE:    Deidre Braga is a 57 y.o. female who presents for follow-up back    HPI Comments: Patient arrives here for follow-up of her back. She was recently seen in the ER and found L1 L2 L3 compression fractures. She states pain started after moving a bedside table. The x-ray report is unclear whether these are old or new compression fractures. She does have a history of osteoporosis and is on Fosamax. She reports that she is not getting much relief with her pain medication. She was on naltrexone but stopped that 3-4 days ago. Patient does have a history of alcoholism. Is currently at [facility]. She was given hydrocodone in the ER and reports some improvement but very little.  Also continues with chest discomfort. She did get an abdominal ultrasound to rule out cholecystitis which was negative.      Allergies      Allergen   Reactions     Baclofen  Confusion     Sleep walks      Gabapentin  Tardive Dyskinesia     Sulfa (Sulfonamide Antibiotics)  Anaphylaxis     Tramadol       bloating    ,   Family History       Problem   Relation Age of Onset     Stroke  Father      Heart Disease  Father      stents       Other  Mother      , Nehemias-Danlos       Cancer  Brother      skin     ,   Current Outpatient Prescriptions on File Prior to Visit       Medication  Sig Dispense Refill     acetaminophen (TYLENOL EXTRA STRGTH) 500 mg tablet Take 1-2 tablets by mouth every 6 hours if needed. Max acetaminophen dose: 4000mg in 24 hrs.   Indications: Back Pain, joint pain 100 tablet 2     albuterol (PROVENTIL;VENTOLIN) 2.5 mg/0.5 mL neb solution  Inhale 2.5 mg via a nebulizer every 6 hours if needed.       albuterol HFA (VENTOLIN HFA) 90 mcg/actuation inhaler Inhale 2 Puffs by mouth 4 times daily if needed.       alendronate (FOSAMAX) 35 mg tablet Take 35 mg by mouth once a week in the morning. Take on empty stomach with full glass of water. Do not lie down for 1 hr.       buPROPion (WELLBUTRIN XL) 150 mg Extended-Release tablet TAKE 1 TAB BY MOUTH ONCE DAILY IN THE MORNING  3     calcium with vitamin D3 (OS- + D) tablet Take 1 tablet by mouth once daily with a meal. 30 tablet 0     COMBIVENT RESPIMAT  mcg/actuation inhaler INHALE 1 PUFF 4 TIMES PER DAY  11     CYANOCOBALAMIN, VITAMIN B-12, (VITAMIN B-12 ORAL) Take 1,000 mcg by mouth once daily.       cyclobenzaprine (FLEXERIL) 10 mg tablet Take 1 tablet by mouth 3 times daily. 30 tablet 0     DULoxetine (CYMBALTA) 60 mg Delayed-release capsule Take 1 capsule by mouth 2 times daily. 60 capsule 0     etodolac (LODINE) 300 mg capsule Take 1 capsule by mouth 2 times daily with meals. 60 capsule 0     furosemide (LASIX) 20 mg tablet TAKE 1 TABLET (20 MG) BY MOUTH DAILY  3     hydrOXYzine pamoate (VISTARIL) 50 mg capsule Take 50 mg by mouth 3 times daily if needed for Anxiety.       magnesium oxide (MAG-) 400 mg tablet Take 1 tablet by mouth once daily. 30 tablet 0     omeprazole (PRILOSEC) 20 mg Delayed-Release capsule   5     pregabalin (LYRICA) 150 mg capsule Take 1 capsule by mouth 2 times daily. 60 capsule 0     SYMBICORT 160-4.5 mcg/actuation (160-4.5 mcg each actuation) inhaler INHALE 2 PUFFS BY MOUTH TWICE A DAY  5     No current facility-administered medications on file prior to visit.    ,   Past Surgical History       Procedure   Laterality Date     Broken shoulder w/ repair        Kj and bso        All top teeth removed        Lumbar fusion   2012     L4-5 in [place]     ,   Social History       Substance Use Topics         Smoking status:   Current Every Day Smoker      Packs/day:  0.30     Years:  40.00     Types:  Cigarettes     Last attempt to quit:  2/10/2017     Smokeless tobacco:   Never Used     Alcohol use   No      Comment: binge when drinks, but was in tx 02/22/17 out of treatment       and   Social History       Substance Use Topics         Smoking status:   Current Every Day Smoker     Packs/day:  0.30     Years:  40.00     Types:  Cigarettes     Last attempt to quit:  2/10/2017     Smokeless tobacco:   Never Used     Alcohol use   No      Comment: binge when drinks, but was in tx 02/22/17 out of treatment          REVIEW OF SYSTEMS:  Review of Systems   Cardiovascular: Positive for chest pain.   Gastrointestinal: Negative for heartburn.   Musculoskeletal: Positive for back pain. Negative for myalgias.   Neurological: Negative for dizziness, sensory change and focal weakness.       OBJECTIVE:  /70  Pulse 80  Wt 72.1 kg (159 lb)  BMI 27.29 kg/m2    EXAM:   Physical Exam   Constitutional: She is well-developed, well-nourished, and in no distress.   Older than stated age   Cardiovascular: Normal rate, regular rhythm and normal heart sounds.    No murmur heard.  Pulmonary/Chest: Effort normal and breath sounds normal.   Abdominal: Soft.   Musculoskeletal: She exhibits tenderness (tenderness is mainly located in the lumbar area. Very little pain along the spinous processes.).   Neurological: She is alert.   Patient can stand on her heels and toes without any difficulty.   Psychiatric: Affect normal.       ASSESSMENT/PLAN:    ICD-10-CM    1. Acute low back pain without sciatica, unspecified back pain laterality M54.5 HYDROcodone-acetaminophen, 5-325 mg, (NORCO) per tablet   2. Lumbar strain, initial encounter S39.012A HYDROcodone-acetaminophen, 5-325 mg, (NORCO) per tablet        Plan:  I suspect her back pain is more muscular in origin than skeletal. I advised her I would do hydrocodone only for short-term especially in light of her past medical history. Sounds are may  "have been some \"mild\" problems per patient with narcotics. Physical therapy if no improvement.        "

## 2018-01-04 NOTE — TELEPHONE ENCOUNTER
Patient Information     Patient Name MRN Deidre Echeverria [id number] Female 1960      Telephone Encounter by Corazon Meza RN at 3/30/2017  2:40 PM     Author:  Corazon Meza RN Service:  (none) Author Type:  NURS- Registered Nurse     Filed:  3/30/2017  2:42 PM Encounter Date:  3/30/2017 Status:  Signed     :  Corazon Meza RN (NURS- Registered Nurse)            This is a Refill request from: [facility]   Name of Medication: Flexeril  Quantity requested: 30  Last fill date: 3/18/17  Due for refill: 3/28/17  Last visit with USHA CORONEL was on: 2017 in [facility]  PCP:  No Pcp  Controlled Substance Agreement:  n/a   Diagnosis r/t this medication request: low back pain     Unable to complete prescription refill per RN Medication Refill Policy.................... Corazon Meza RN ....................  3/30/2017   2:41 PM

## 2018-01-04 NOTE — NURSING NOTE
Patient Information     Patient Name MRN Sex Deidre Jimenez [id number] Female 1960      Nursing Note by Kristen Hernandez at 2017  3:00 PM     Author:  Kristen Hernandez Service:  (none) Author Type:  NURS- Student Practical Nurse     Filed:  2017  3:18 PM Encounter Date:  2017 Status:  Signed     :  Kristen Hernandez (NURS- Student Practical Nurse)            Patient presents with shortness of breath, wheezing, cough productive green/grey phlegm, fevers, pain in right side from  Shoulder to lower back when getting out of bed, tightness with breathing in chest. This has been off and on since February. Kristen Hernandez LPN .............2017  3:10 PM

## 2018-01-26 ENCOUNTER — AMBULATORY - GICH (OUTPATIENT)
Dept: RADIOLOGY | Facility: OTHER | Age: 58
End: 2018-01-26

## 2018-01-26 DIAGNOSIS — M54.41 LOW BACK PAIN WITH RIGHT-SIDED SCIATICA: ICD-10-CM

## 2018-01-26 DIAGNOSIS — Z98.1 ARTHRODESIS STATUS: ICD-10-CM

## 2018-01-26 DIAGNOSIS — G89.29 OTHER CHRONIC PAIN: ICD-10-CM

## 2018-01-26 DIAGNOSIS — M54.42 LOW BACK PAIN WITH LEFT-SIDED SCIATICA: ICD-10-CM

## 2018-01-27 VITALS
DIASTOLIC BLOOD PRESSURE: 72 MMHG | HEIGHT: 64 IN | WEIGHT: 153 LBS | SYSTOLIC BLOOD PRESSURE: 120 MMHG | SYSTOLIC BLOOD PRESSURE: 118 MMHG | DIASTOLIC BLOOD PRESSURE: 70 MMHG | BODY MASS INDEX: 26.12 KG/M2 | WEIGHT: 159 LBS | HEART RATE: 80 BPM | HEART RATE: 88 BPM

## 2018-01-27 VITALS
SYSTOLIC BLOOD PRESSURE: 102 MMHG | WEIGHT: 156.4 LBS | DIASTOLIC BLOOD PRESSURE: 76 MMHG | HEIGHT: 64 IN | BODY MASS INDEX: 26.84 KG/M2 | RESPIRATION RATE: 28 BRPM | DIASTOLIC BLOOD PRESSURE: 70 MMHG | RESPIRATION RATE: 20 BRPM | HEART RATE: 112 BPM | TEMPERATURE: 98.7 F | TEMPERATURE: 97.3 F | WEIGHT: 157.2 LBS | SYSTOLIC BLOOD PRESSURE: 118 MMHG

## 2018-01-27 VITALS
SYSTOLIC BLOOD PRESSURE: 118 MMHG | HEART RATE: 96 BPM | WEIGHT: 152.8 LBS | DIASTOLIC BLOOD PRESSURE: 82 MMHG | BODY MASS INDEX: 27.07 KG/M2 | TEMPERATURE: 98.2 F | HEIGHT: 63 IN | RESPIRATION RATE: 18 BRPM

## 2018-01-27 VITALS — HEART RATE: 110 BPM | RESPIRATION RATE: 96 BRPM | TEMPERATURE: 96.1 F | WEIGHT: 157.4 LBS

## 2018-02-01 ENCOUNTER — HOSPITAL ENCOUNTER (OUTPATIENT)
Dept: RADIOLOGY | Facility: OTHER | Age: 58
End: 2018-02-01

## 2018-02-01 DIAGNOSIS — M54.42 LOW BACK PAIN WITH LEFT-SIDED SCIATICA: ICD-10-CM

## 2018-02-01 DIAGNOSIS — G89.29 OTHER CHRONIC PAIN: ICD-10-CM

## 2018-02-01 DIAGNOSIS — Z98.1 ARTHRODESIS STATUS: ICD-10-CM

## 2018-02-01 DIAGNOSIS — M54.41 LOW BACK PAIN WITH RIGHT-SIDED SCIATICA: ICD-10-CM

## 2018-02-13 NOTE — PROGRESS NOTES
Patient Information     Patient Name MRN Deidre Echeverria [id number] Female 1960      Progress Notes by Gisell Olson at 2018  3:04 PM     Author:  Gisell Olson Service:  (none) Author Type:  Other Clinical Staff     Filed:  2018  3:04 PM Date of Service:  2018  3:04 PM Status:  Signed     :  Gisell Olson (Other Clinical Staff)            Falls Risk Criteria:    Age 65 and older or under age 4        Sensory deficits    Poor vision    Use of ambulatory aides    Impaired judgment    Unable to walk independently    Meets High Risk criteria for falls:  no

## 2018-07-23 NOTE — PROGRESS NOTES
Patient Information     Patient Name  Deidre Braga MRN  [id number] Sex  Female   1960      Letter by Kennedy Tilley MD at      Author:  Kennedy Tilley MD Service:  (none) Author Type:  (none)    Filed:   Encounter Date:  2017 Status:  (Other)           Deidre Braga   [address]  [place] [address]          2017    Dear Ms. Braga:    A refill of cyclobenzaprine (FLEXERIL) 10 mg tablet has been requested by your pharmacy. This request has been denied.    Additional refills require a follow up appointment. Please call the clinic at [phone] to schedule your appointment.    Thank you,    The Refill Nurse  [facility]

## 2018-07-24 NOTE — PROGRESS NOTES
Patient Information     Patient Name  Deidre Braga MRN  [id number] Sex  Female   1960      Letter by Eyad Catherine at      Author:  Eyad Catherine Service:  (none) Author Type:  (none)    Filed:   Date of Service:   Status:  (Other)       [facility]  [address]  [place] [address]  [phone]         Deidre Braga    [address]  [place] [address]      2017  Date of Breast Imagin2017  2:57 PM    Dear Ms. Braga:    Your recent breast imaging examination showed a finding that requires additional imaging studies for a complete evaluation. Most findings are benign (not cancer). Please call [phone] to schedule an appointment for these tests if you have not already done so.    A report of your results was sent to your health care provider(s).    Your images will become part of your medical file here at [facility] and will be available for your continuing care. You are responsible for informing any new health care provider or breast imaging facility of the date and location of this examination.    Although mammography is the most accurate method for early detection, not all cancers are found through mammography. If you notice any new changes in your breast(s) please inform your health care provider without delay.    Thank you for choosing [facility] to participate in your healthcare needs.       [facility] Recommendations for Early Breast Cancer Detection   in Women without Symptoms  When to start having mammograms to screen for breast cancer, and how often to have them, is a personal decision. It should be based on your preferences, your values and your risk for developing breast cancer. [facility] recommends that you and your health care provider together determine when mammograms are right for you.    [facility] recommends the following guidelines  for women who have an average risk for breast cancer, based on American Cancer Society guidelines:    Age 40 to 44: Mammograms are optional.     Age 45 to 54: Have a mammogram every year.    Age 55 and older: Have a mammogram every year, or transition to having one every 2 years. Continue to have mammograms as long as your health is good.    If you have a higher than average risk for breast cancer, your health care provider may recommend a different schedule.

## 2018-09-13 ENCOUNTER — TRANSFERRED RECORDS (OUTPATIENT)
Dept: HEALTH INFORMATION MANAGEMENT | Facility: OTHER | Age: 58
End: 2018-09-13

## 2018-09-23 ENCOUNTER — APPOINTMENT (OUTPATIENT)
Dept: ULTRASOUND IMAGING | Facility: OTHER | Age: 58
End: 2018-09-23
Attending: FAMILY MEDICINE
Payer: MEDICARE

## 2018-09-23 ENCOUNTER — APPOINTMENT (OUTPATIENT)
Dept: GENERAL RADIOLOGY | Facility: OTHER | Age: 58
End: 2018-09-23
Attending: FAMILY MEDICINE
Payer: MEDICARE

## 2018-09-23 ENCOUNTER — HOSPITAL ENCOUNTER (EMERGENCY)
Facility: OTHER | Age: 58
Discharge: HOME | End: 2018-09-23
Attending: FAMILY MEDICINE | Admitting: FAMILY MEDICINE
Payer: MEDICARE

## 2018-09-23 VITALS
SYSTOLIC BLOOD PRESSURE: 107 MMHG | DIASTOLIC BLOOD PRESSURE: 70 MMHG | RESPIRATION RATE: 18 BRPM | OXYGEN SATURATION: 96 %

## 2018-09-23 DIAGNOSIS — M79.604 RIGHT LEG PAIN: ICD-10-CM

## 2018-09-23 DIAGNOSIS — R30.0 DYSURIA: ICD-10-CM

## 2018-09-23 LAB
ALBUMIN SERPL-MCNC: 3.5 G/DL (ref 3.5–5.7)
ALBUMIN UR-MCNC: NEGATIVE MG/DL
ALP SERPL-CCNC: 46 U/L (ref 34–104)
ALT SERPL W P-5'-P-CCNC: 20 U/L (ref 7–52)
AMMONIA PLAS-SCNC: 31 UMOL/L (ref 16–53)
AMPHETAMINES UR QL SCN: NOT DETECTED
ANION GAP SERPL CALCULATED.3IONS-SCNC: 6 MMOL/L (ref 3–14)
APPEARANCE UR: CLEAR
AST SERPL W P-5'-P-CCNC: 23 U/L (ref 13–39)
BACTERIA #/AREA URNS HPF: ABNORMAL /HPF
BARBITURATES UR QL: NOT DETECTED
BASOPHILS # BLD AUTO: 0 10E9/L (ref 0–0.2)
BASOPHILS NFR BLD AUTO: 0.2 %
BENZODIAZ UR QL: NOT DETECTED
BILIRUB SERPL-MCNC: 0.4 MG/DL (ref 0.3–1)
BILIRUB UR QL STRIP: ABNORMAL
BUN SERPL-MCNC: 12 MG/DL (ref 7–25)
BUPRENORPHINE UR QL: NOT DETECTED NG/ML
CALCIUM SERPL-MCNC: 8.7 MG/DL (ref 8.6–10.3)
CANNABINOIDS UR QL: NOT DETECTED NG/ML
CHLORIDE SERPL-SCNC: 105 MMOL/L (ref 98–107)
CO2 SERPL-SCNC: 26 MMOL/L (ref 21–31)
COCAINE UR QL: NOT DETECTED
COLOR UR AUTO: YELLOW
CREAT SERPL-MCNC: 1.25 MG/DL (ref 0.6–1.2)
D DIMER PPP DDU-MCNC: 271 NG/ML D-DU (ref 0–230)
D-METHAMPHET UR QL: NOT DETECTED NG/ML
DIFFERENTIAL METHOD BLD: ABNORMAL
EOSINOPHIL # BLD AUTO: 0.1 10E9/L (ref 0–0.7)
EOSINOPHIL NFR BLD AUTO: 1.6 %
ERYTHROCYTE [DISTWIDTH] IN BLOOD BY AUTOMATED COUNT: 15.9 % (ref 10–15)
ETHANOL SERPL-MCNC: <0.01 %
GFR SERPL CREATININE-BSD FRML MDRD: 44 ML/MIN/1.7M2
GLUCOSE SERPL-MCNC: 77 MG/DL (ref 70–105)
GLUCOSE UR STRIP-MCNC: NEGATIVE MG/DL
HCT VFR BLD AUTO: 29.6 % (ref 35–47)
HGB BLD-MCNC: 9.7 G/DL (ref 11.7–15.7)
HGB UR QL STRIP: NEGATIVE
IMM GRANULOCYTES # BLD: 0 10E9/L (ref 0–0.4)
IMM GRANULOCYTES NFR BLD: 0.5 %
INR PPP: 1.02 (ref 0–1.3)
KETONES UR STRIP-MCNC: NEGATIVE MG/DL
LEUKOCYTE ESTERASE UR QL STRIP: ABNORMAL
LIPASE SERPL-CCNC: 19 U/L (ref 11–82)
LYMPHOCYTES # BLD AUTO: 1.1 10E9/L (ref 0.8–5.3)
LYMPHOCYTES NFR BLD AUTO: 25.2 %
MCH RBC QN AUTO: 29 PG (ref 26.5–33)
MCHC RBC AUTO-ENTMCNC: 32.8 G/DL (ref 31.5–36.5)
MCV RBC AUTO: 88 FL (ref 78–100)
METHADONE UR QL SCN: NOT DETECTED
MONOCYTES # BLD AUTO: 0.5 10E9/L (ref 0–1.3)
MONOCYTES NFR BLD AUTO: 10.4 %
NEUTROPHILS # BLD AUTO: 2.7 10E9/L (ref 1.6–8.3)
NEUTROPHILS NFR BLD AUTO: 62.1 %
NITRATE UR QL: NEGATIVE
OPIATES UR QL SCN: NOT DETECTED
OXYCODONE UR QL: NOT DETECTED NG/ML
PCP UR QL SCN: NOT DETECTED
PH UR STRIP: 5.5 PH (ref 5–9)
PLATELET # BLD AUTO: 134 10E9/L (ref 150–450)
POTASSIUM SERPL-SCNC: 3.5 MMOL/L (ref 3.5–5.1)
PROPOXYPH UR QL: NOT DETECTED NG/ML
PROT SERPL-MCNC: 5.7 G/DL (ref 6.4–8.9)
RBC # BLD AUTO: 3.35 10E12/L (ref 3.8–5.2)
RBC #/AREA URNS AUTO: ABNORMAL /HPF
SODIUM SERPL-SCNC: 137 MMOL/L (ref 134–144)
SOURCE: ABNORMAL
SP GR UR STRIP: <1.005 (ref 1–1.03)
TRICYCLICS UR QL SCN: ABNORMAL NG/ML
UROBILINOGEN UR STRIP-ACNC: 0.2 EU/DL (ref 0.2–1)
WBC # BLD AUTO: 4.3 10E9/L (ref 4–11)
WBC #/AREA URNS AUTO: ABNORMAL /HPF

## 2018-09-23 PROCEDURE — 93971 EXTREMITY STUDY: CPT | Mod: TC,RT

## 2018-09-23 PROCEDURE — 85379 FIBRIN DEGRADATION QUANT: CPT | Performed by: FAMILY MEDICINE

## 2018-09-23 PROCEDURE — 85025 COMPLETE CBC W/AUTO DIFF WBC: CPT | Performed by: FAMILY MEDICINE

## 2018-09-23 PROCEDURE — 80053 COMPREHEN METABOLIC PANEL: CPT | Performed by: FAMILY MEDICINE

## 2018-09-23 PROCEDURE — 80320 DRUG SCREEN QUANTALCOHOLS: CPT | Performed by: FAMILY MEDICINE

## 2018-09-23 PROCEDURE — 82140 ASSAY OF AMMONIA: CPT | Performed by: FAMILY MEDICINE

## 2018-09-23 PROCEDURE — 99284 EMERGENCY DEPT VISIT MOD MDM: CPT | Mod: Z6 | Performed by: FAMILY MEDICINE

## 2018-09-23 PROCEDURE — A9270 NON-COVERED ITEM OR SERVICE: HCPCS | Mod: GY | Performed by: FAMILY MEDICINE

## 2018-09-23 PROCEDURE — 81001 URINALYSIS AUTO W/SCOPE: CPT | Performed by: FAMILY MEDICINE

## 2018-09-23 PROCEDURE — 36415 COLL VENOUS BLD VENIPUNCTURE: CPT | Performed by: FAMILY MEDICINE

## 2018-09-23 PROCEDURE — 73502 X-RAY EXAM HIP UNI 2-3 VIEWS: CPT

## 2018-09-23 PROCEDURE — 87086 URINE CULTURE/COLONY COUNT: CPT | Performed by: FAMILY MEDICINE

## 2018-09-23 PROCEDURE — 85610 PROTHROMBIN TIME: CPT | Performed by: FAMILY MEDICINE

## 2018-09-23 PROCEDURE — 80307 DRUG TEST PRSMV CHEM ANLYZR: CPT | Performed by: FAMILY MEDICINE

## 2018-09-23 PROCEDURE — 87088 URINE BACTERIA CULTURE: CPT | Performed by: FAMILY MEDICINE

## 2018-09-23 PROCEDURE — 83690 ASSAY OF LIPASE: CPT | Performed by: FAMILY MEDICINE

## 2018-09-23 PROCEDURE — 99285 EMERGENCY DEPT VISIT HI MDM: CPT | Mod: 25 | Performed by: FAMILY MEDICINE

## 2018-09-23 PROCEDURE — 25000132 ZZH RX MED GY IP 250 OP 250 PS 637: Mod: GY | Performed by: FAMILY MEDICINE

## 2018-09-23 RX ORDER — DOXYCYCLINE 100 MG/1
100 CAPSULE ORAL 2 TIMES DAILY
Qty: 28 CAPSULE | Refills: 0 | Status: SHIPPED | OUTPATIENT
Start: 2018-09-23 | End: 2018-12-21

## 2018-09-23 RX ORDER — DOXYCYCLINE 100 MG/1
100 CAPSULE ORAL ONCE
Status: COMPLETED | OUTPATIENT
Start: 2018-09-23 | End: 2018-09-23

## 2018-09-23 RX ADMIN — DOXYCYCLINE HYCLATE 100 MG: 100 CAPSULE, GELATIN COATED ORAL at 22:03

## 2018-09-23 ASSESSMENT — ENCOUNTER SYMPTOMS
COUGH: 1
FEVER: 0
CONFUSION: 1
DYSURIA: 0
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
## 2018-09-23 NOTE — ED AVS SNAPSHOT
" [facility]    [address] [facility] [address]    [place] [address]    Phone:  [phone]    Fax:  [phone]                                       Deidre Braga   MRN: [id number]    Department:  [facility]   Date of Visit:  9/23/2018           Patient Information     Date Of Birth          1960        Your diagnoses for this visit were:     Right leg pain     Dysuria        You were seen by Mark Anthony Munoz MD.      Follow-up Information     Follow up with Jana Hurst PA-C.    Specialty:  Physician Assistant    Why:  As needed    Contact information:    [address] [facility] [id number] [phone]          Follow up with [facility].    Specialty:  EMERGENCY MEDICINE    Why:  If symptoms worsen    Contact information:    [address] [facility] [address] [phone]        Discharge Instructions         Dysuria     Painful urination (dysuria) is often caused by a problem in the urinary tract.   Dysuria is pain felt during urination. It is often described as a burning. Learn more about this problem and how it can be treated.  What causes dysuria?  Possible causes include:    Infection with a bacteria or virus such as a urinary tract infection (UTI or a sexually transmitted infection (STI)    Sensitivity or allergy to chemicals such as those found in lotions and other products    Prostate or bladder problems    Radiation therapy to the pelvic area  How is dysuria diagnosed?  Your healthcare provider will examine you. He or she will ask about your symptoms and health. After talking with you and doing a physical exam, your healthcare provider may know what is causing your dysuria. He or she will usually request  a sample of your urine. Tests of your urine, or a \"urinalysis,\" are done. A urinalysis may include:    Looking at the urine sample (visual exam)    Checking for substances (chemical " exam)    Looking at a small amount under a microscope (microscopic exam)  Some parts of the urinalysis may be done in the provider's office and some in a lab. And, the urine sample may be checked for bacteria and yeast (urine culture). Your healthcare provider will tell you more about these tests if they are needed.  How is dysuria treated?  Treatment depends on the cause. If you have a bacterial infection, you may need antibiotics. You may be given medicines to make it easier for you to urinate and help relieve pain. Your healthcare provider can tell you more about your treatment options. Untreated, symptoms may get worse.  When to call your healthcare provider  Call the healthcare provider right away if you have any of the following:    Fever of 100.4 F (38 C) or higher     No improvement after three days of treatment    Trouble urinating because of pain    New or increased discharge from the vagina or penis    Rash or joint pain    Increased back or abdominal pain    Enlarged painful lymph nodes (lumps) in the groin   Date Last Reviewed: 1/1/2017 2000-2017 [employer]. [address] [place] [address]. All rights reserved. This information is not intended as a substitute for professional medical care. Always follow your healthcare professional's instructions.          24 Hour Appointment Hotline     To schedule an appointment at [facility], please call [phone]. If you don't have a family doctor or clinic, we will help you find one. [facility] clinics are conveniently located to serve the needs of you and your family.           Review of your medicines      START taking        Dose / Directions Last dose taken    doxycycline 100 MG capsule   Commonly known as:  VIBRAMYCIN   Dose:  100 mg   Indication:  Urinary Tract Infection   Quantity:  28 capsule   Replaces:  doxycycline 100 MG tablet        Take 1 capsule (100 mg) by mouth 2 times daily   Refills:  0          Our records show  that you are taking the medicines listed below. If these are incorrect, please call your family doctor or clinic.        Dose / Directions Last dose taken    beclomethasone 80 MCG/ACT Inhaler   Commonly known as:  QVAR   Dose:  2 puff   Quantity:  1 Inhaler        Inhale 2 puffs into the lungs 2 times daily.   Refills:  5        buPROPion 150 MG 12 hr tablet   Commonly known as:  WELLBUTRIN SR   Dose:  150 mg   Quantity:  180 tablet        Take 1 tablet by mouth 2 times daily.   Refills:  3        cyclobenzaprine 10 MG tablet   Commonly known as:  FLEXERIL   Dose:  10 mg   Quantity:  90 tablet        Take 1 tablet by mouth 3 times daily as needed.   Refills:  2        IBUPROFEN 200 200 MG Tabs        2 tablets prn q 4 hours   Refills:  0        * ipratropium - albuterol 0.5 mg/2.5 mg/3 mL 0.5-2.5 (3) MG/3ML neb solution   Commonly known as:  DUONEB   Dose:  1 ampule        Take 1 ampule by nebulization every 6 hours as needed.   Refills:  0        * albuterol-ipratropium  MCG/ACT Inhaler   Commonly known as:  COMBIVENT   Dose:  2 puff   Quantity:  1 Inhaler        Inhale 2 puffs into the lungs every 6 hours as needed.   Refills:  3        NASONEX 50 MCG/ACT spray   Dose:  2 spray   Generic drug:  mometasone        Spray 2 sprays into both nostrils as needed.   Refills:  0        omeprazole 20 MG tablet   Dose:  20 mg   Quantity:  90 tablet        Take 20 mg by mouth daily.   Refills:  3        predniSONE 20 MG tablet   Commonly known as:  DELTASONE   Dose:  20 mg   Quantity:  20 tablet        Take 1 tablet by mouth daily. Take 3 tablets for 3 days, 2 for 3 days, 1 for 3 days, ? for 3 days then stop.  Take in the morning with food.   Refills:  0        PROAIR  (90 Base) MCG/ACT inhaler   Dose:  1-2 puff   Quantity:  1 Inhaler   Generic drug:  albuterol        Inhale 1-2 puffs into the lungs every 4 hours as needed for shortness of breath / dyspnea. Should be every 4-6 hours   Refills:  2        RED YEAST  RICE        daily.   Refills:  0        * Notice:  This list has 2 medication(s) that are the same as other medications prescribed for you. Read the directions carefully, and ask your doctor or other care provider to review them with you.      STOP taking        Dose Reason for stopping Comments    doxycycline 100 MG tablet   Commonly known as:  VIBRA-TABS   Replaced by:  doxycycline 100 MG capsule                      Prescriptions were sent or printed at these locations (1 Prescription)                   [facility] [address] [place] [id number]    Telephone:  [phone]   Fax:  [phone]   Hours:                  E-Prescribed (1 of 1)         doxycycline (VIBRAMYCIN) 100 MG capsule                Procedures and tests performed during your visit     *UA reflex to Microscopic    Ammonia    CBC with platelets differential    Comprehensive metabolic panel    D-Dimer (HI,GH)    Drug of Abuse Screen Urine GH    Ethanol GH    INR    Lipase    US Lower Extremity Venous Duplex Right    Urine Culture Aerobic Bacterial    Urine Microscopic    XR Pelvis and Hip Right 1 View      Orders Needing Specimen Collection     None      Pending Results     Date and Time Order Name Status Description    9/23/2018 2151 Urine Culture Aerobic Bacterial In process     9/23/2018 1838 XR Pelvis and Hip Right 1 View In process             Pending Culture Results     Date and Time Order Name Status Description    9/23/2018 2151 Urine Culture Aerobic Bacterial In process             Pending Results Instructions     If you had any lab results that were not finalized at the time of your Discharge, you can call the ED Lab Result RN at [phone]. You will be contacted by this team for any positive Lab results or changes in treatment. The nurses are available 7 days a week from 10A to 6:30P.  You can leave a message 24 hours per day and they will return your call.       "  Thank you for choosing [facility]       Thank you for choosing [facility] for your care. Our goal is always to provide you with excellent care. Hearing back from our patients is one way we can continue to improve our services. Please take a few minutes to complete the written survey that you may receive in the mail after you visit with us. Thank you!        [employer] Information     [employer] lets you send messages to your doctor, view your test results, renew your prescriptions, schedule appointments and more. To sign up, go to [email or web address] . Click on \"Log in\" on the left side of the screen, which will take you to the Welcome page. Then click on \"Sign up Now\" on the right side of the page.     You will be asked to enter the access code listed below, as well as some personal information. Please follow the directions to create your username and password.     Your access code is: [email or web address]  Expires: 2018 10:18 PM     Your access code will  in 90 days. If you need help or a new code, please call your [facility] clinic or [phone].        Care EveryWhere ID     This is your Care EveryWhere ID. This could be used by other organizations to access your [facility] medical records  [id number]        Equal Access to Services     ELSI CAGLE : Lamont posto Sodanis, waaxda luqadaha, qaybta kaalmada adejocelinyada, carlita wynne. So [place] [phone].    ATENCIÓN: Si habla español, tiene a genao disposición servicios gratuitos de asistencia lingüística. Llame al [phone].    We comply with applicable federal civil rights laws and [place] laws. We do not discriminate on the basis of race, color, national origin, age, disability, sex, sexual orientation, or gender identity.            After Visit Summary       This is your record. Keep this with you and show to your community pharmacist(s) and doctor(s) at your next visit.                  "

## 2018-09-23 NOTE — ED AVS SNAPSHOT
[facility]    [address]    [place] [address]    Phone:  [phone]    Fax:  [phone]                                       Deidre Braga   MRN: [id number]    Department:  [facility]   Date of Visit:  9/23/2018           After Visit Summary Signature Page     I have received my discharge instructions, and my questions have been answered. I have discussed any challenges I see with this plan with the nurse or doctor.    ..........................................................................................................................................  Patient/Patient Representative Signature      ..........................................................................................................................................  Patient Representative Print Name and Relationship to Patient    ..................................................               ................................................  Date                                   Time    ..........................................................................................................................................  Reviewed by Signature/Title    ...................................................              ..............................................  Date                                               Time          22EPIC Rev 08/18

## 2018-09-25 ENCOUNTER — TELEPHONE (OUTPATIENT)
Dept: EMERGENCY MEDICINE | Facility: OTHER | Age: 58
End: 2018-09-25

## 2018-09-25 DIAGNOSIS — N39.0 UTI (URINARY TRACT INFECTION): ICD-10-CM

## 2018-09-25 LAB
BACTERIA SPEC CULT: ABNORMAL
SPECIMEN SOURCE: ABNORMAL

## 2018-09-25 RX ORDER — AMOXICILLIN 500 MG/1
500 CAPSULE ORAL 2 TIMES DAILY
Qty: 10 CAPSULE | Refills: 0 | Status: SHIPPED | OUTPATIENT
Start: 2018-09-25 | End: 2018-09-30

## 2018-09-25 NOTE — TELEPHONE ENCOUNTER
[facility] Emergency Department Lab result notification [Adult-Female]    [facility] ED lab result protocol used  Urine culture    Reason for call  Notify of lab results, assess symptoms,  review ED providers recommendations/discharge instructions (if necessary) and advise per ED lab result f/u protocol    Lab Result (including Rx patient on, if applicable)  Final Urine Culture Report on 9/25/18  Emergency Dept discharge antibiotic prescribed: Doxycycline 100 mg PO tablet, 1 tablet (100 mg) by mouth 2 times daily for 14 days  #1. Bacteria, >100,000 colonies/mL Enterococcus faecalis,  is [NOT TESTED] to antibiotic.   Change in treatment as per [facility] ED Lab result protocol.  Information table from ED Provider visit on 9/23/18  Symptoms reported at ED visit (Chief complaint, HPI) Chief Complaint   Patient presents with     Groin Pain       right, exacerbation of chronic issue?      The history is provided by the patient and the EMS personnel.      Deidre Braga is a 58 year old female who called EMS for vague c/o right groin and right leg pain for 2 months since a fall at [facility] and also multiple other falls including yesterday.  She is wanting pain medications but can't recall any details and her story changes with each telling.  She smokes half a pack per day.  She denies alcohol use or other drug use.  She denies history of hepatitis C or chronic opiate use, but does report chronic muscle relaxant use.   Significant Medical hx, if applicable (i.e. CKD, diabetes) Reviewed   Allergies Allergies   Allergen Reactions     Sulfa Drugs Anaphylaxis     Naproxen      swelling     Tramadol Swelling     Baclofen Swelling     Gabapentin Swelling      Weight, if applicable Wt Readings from Last 2 Encounters:   07/21/17 69.3 kg (152 lb 12.8 oz)   07/21/17 69.4 kg (153 lb)      Coumadin/Warfarin [Yes /No] No   Creatinine Level (mg/dl) Creatinine   Date Value Ref Range Status   09/23/2018 1.25 (H) 0.60 - 1.20  mg/dL Final      Creatinine clearance (ml/min), if applicable Creatinine clearance cannot be calculated (Unknown ideal weight.)   ED providers Impression and Plan (applicable information) 58-year-old female with stigmata of chronic alcohol abuse although denying this and extremely vague historian seeking pain medications for uncertain pain.  I will workup her right groin pain with the pelvis and right hip film and she has symmetric bilateral lower leg swelling and I will obtain d-dimer to rule out need for further DVT workup and right leg.  Because of her sleepiness and slurred speech and concern for impairment I am going to hold off on giving her any pain medications at this time.  I have ordered labs to review the extent of any liver damage with INR, ammonia level, and CMP pending.  Depending on results consider head CT because of reports of frequent falls.  Sign patient out to Dr. Jacome at shift change to follow-up on labs and any further workup.  Mark Anthony Munoz MD.    Right leg pain, pain films are negative for fracture.  Ultrasound negative for DVT.  Labs show UTI, slight anemia and thrombocytopenia as well.  After discussion I feel patient's low risk for Anaplasma but of course [place] this [place] known is at 0 risk.  A urine culture is pending.  Will treat with doxycycline for her UTI which should concomitantly treat for Anaplasma if that is present.  Motrin Tylenol recommended as needed for pain.  Patient is asking for pain control.  She ambulated well prior to discharge.  Patient's well oriented prior to discharge.  Patient verbalized understanding of plan, she will go home by taxi.   Taurus Jacome MD  09/23/18 5427     ED diagnosis dysuria   ED provider Taurus Jacome MD and Mark Anthony Munoz MD      RN Assessment (Patient s current Symptoms), include time called.  [Insert Left message here if message left]  At 1:20p, Left voicemail message requesting a call back to [phone] between 10  a.m. and 6:30 p.m., 7 days a week for patient's ED/UC lab results.  May leave a message 24/7, if no one available.     [facility] Emergency Department Provider Name & Recommendations (included time consulted)  At 1:10P, Consulted with RAFAEL Chase, [facility] Emergency Dept provider, and he advised to add Amoxicillin 500 mg PO BID for 5 days.  Patient should continue the Doxycycline as prescribed.      [RN Name]  Goran Carrizales RN  [facility] RN  Lung Nodule and ED Lab Result RN  Epic pool (ED late result f/u RN): [id number]  [facility] INCIDENTAL RADIOLOGY F/U NURSES: P [id number]  # [phone]    Copy of Lab result   Urine Culture Aerobic Bacterial [[id number]] (Order [id number])   Exam Information   Exam Date Exam Time Accession # Results    9/23/18  9:55 PM [id number]    Component Results   Component Collected Lab   Specimen Description 09/23/2018  9:55 PM [facility]   Catheter   Culture Micro (Abnormal) 09/23/2018  9:55 PM [facility]   >100,000 colonies/mL   Enterococcus faecalis      Culture & Susceptibility   ENTEROCOCCUS FAECALIS   Antibiotic Interpretation Sensitivity Unit Method Status   AMPICILLIN Sensitive 2 ug/mL AMADO Final   LINEZOLID  <=2 ug/mL AMADO Final   NITROFURANTOIN Sensitive <=32 ug/mL AMADO Final   PENICILLIN Sensitive 2 ug/mL AMADO Final   VANCOMYCIN Sensitive <=2 ug/mL AMADO Final

## 2018-09-25 NOTE — TELEPHONE ENCOUNTER
Deidre returned call and she was notified of the recommendation to add Amoxicillin to treat the urinary tract infection  Stay on the Doxycycline  F/u with your PCP as directed by the Emergency Dept provider.  Amoxicillin Rx sent to [facility] [place] Erich Halberg, RN  [facility] RN  Lung Nodule and ED Lab Result RN  Epic pool (ED late result f/u RN): [id number]  [facility] INCIDENTAL RADIOLOGY F/U NURSES: P [id number]  Ph# [phone]

## 2018-09-27 ENCOUNTER — TELEPHONE (OUTPATIENT)
Dept: EMERGENCY MEDICINE | Facility: OTHER | Age: 58
End: 2018-09-27

## 2018-09-27 NOTE — TELEPHONE ENCOUNTER
[facility] Emergency Department Lab result notification     Patient/parent Name  Deidre and sister LOU calling for lab results from 9/25/18 ED visit    Reason for call  Deidre is not herself, feeling ill and confused. Just drove herself to her sister's home.    Lab Result  Discussed lab results in general. On the urine culture the antibiotic was changed to amoxicillin and Deidre doesn't remember if she did that. I gave sister number to the pharmacy where medication Rx was sent to see if it was picked up.   Current symptoms  Deidre is not herself, feeling ill and confused. Deidre says she has pain all over and is sounding clear in her speech but tentative as she doesn't remember things.     Recommendations/Instructions  Sister will now drive her directly to the [facility] emergency room in [place], [place]. To make sure they know she may have an untreated UTI with:    Notes Recorded by Goran Carrizales RN on 9/25/2018 at 12:49 PM  Final Urine Culture Report on 9/25/18  Emergency Dept discharge antibiotic prescribed: Doxycycline 100 mg PO tablet, 1 tablet (100 mg) by mouth 2 times daily for 14 days  #1. Bacteria, >100,000 colonies/mL Enterococcus faecalis,  is [NOT TESTED] to antibiotic.   Change in treatment as per [facility] ED Lab result protocol.  At 1:10P, Consulted with RAFAEL Chase, [facility] Emergency Dept provider, and he advised to add Amoxicillin 500 mg PO BID for 5 days.  Patient should continue the Doxycycline as prescribed.    I did call back and left a message on Deidre's cell as [facility] didn't appear to be working. Message was that she was to finish both the doxycycline and the added amoxicillin for her UTI.    Contact your PCP clinic or return to the Emergency department if your:    Symptoms return.    Symptoms worsen or other concerning symptom's.    PCP follow-up Questions asked: YES       Arin Reese RN  [facility] RN  Lung Nodule and ED Lab Result F/u  RN  Epic pool (ED late result f/u RN): [id number]  # [phone]

## 2018-10-15 ENCOUNTER — TRANSFERRED RECORDS (OUTPATIENT)
Dept: HEALTH INFORMATION MANAGEMENT | Facility: OTHER | Age: 58
End: 2018-10-15

## 2018-12-21 ENCOUNTER — HOSPITAL ENCOUNTER (EMERGENCY)
Facility: OTHER | Age: 58
Discharge: HOME | End: 2018-12-21
Attending: EMERGENCY MEDICINE | Admitting: EMERGENCY MEDICINE
Payer: MEDICARE

## 2018-12-21 VITALS
WEIGHT: 152 LBS | BODY MASS INDEX: 26.93 KG/M2 | HEIGHT: 63 IN | DIASTOLIC BLOOD PRESSURE: 75 MMHG | RESPIRATION RATE: 20 BRPM | SYSTOLIC BLOOD PRESSURE: 102 MMHG | OXYGEN SATURATION: 100 % | TEMPERATURE: 97.3 F | HEART RATE: 133 BPM

## 2018-12-21 DIAGNOSIS — E86.0 DEHYDRATION: ICD-10-CM

## 2018-12-21 DIAGNOSIS — K52.9 GASTROENTERITIS: ICD-10-CM

## 2018-12-21 LAB
ALBUMIN SERPL-MCNC: 4.2 G/DL (ref 3.5–5.7)
ALBUMIN UR-MCNC: NEGATIVE MG/DL
ALP SERPL-CCNC: 68 U/L (ref 34–104)
ALT SERPL W P-5'-P-CCNC: 24 U/L (ref 7–52)
ANION GAP SERPL CALCULATED.3IONS-SCNC: 17 MMOL/L (ref 3–14)
APPEARANCE UR: CLEAR
AST SERPL W P-5'-P-CCNC: 23 U/L (ref 13–39)
BASOPHILS # BLD AUTO: 0 10E9/L (ref 0–0.2)
BASOPHILS NFR BLD AUTO: 0.3 %
BILIRUB SERPL-MCNC: 0.5 MG/DL (ref 0.3–1)
BILIRUB UR QL STRIP: ABNORMAL
BUN SERPL-MCNC: 23 MG/DL (ref 7–25)
CALCIUM SERPL-MCNC: 9.2 MG/DL (ref 8.6–10.3)
CHLORIDE SERPL-SCNC: 104 MMOL/L (ref 98–107)
CO2 SERPL-SCNC: 17 MMOL/L (ref 21–31)
COLOR UR AUTO: YELLOW
CREAT SERPL-MCNC: 0.93 MG/DL (ref 0.6–1.2)
DIFFERENTIAL METHOD BLD: ABNORMAL
EOSINOPHIL # BLD AUTO: 0.1 10E9/L (ref 0–0.7)
EOSINOPHIL NFR BLD AUTO: 1 %
ERYTHROCYTE [DISTWIDTH] IN BLOOD BY AUTOMATED COUNT: 13.5 % (ref 10–15)
ETHANOL SERPL-MCNC: <0.01 %
GFR SERPL CREATININE-BSD FRML MDRD: 62 ML/MIN/{1.73_M2}
GLUCOSE SERPL-MCNC: 83 MG/DL (ref 70–105)
GLUCOSE UR STRIP-MCNC: NEGATIVE MG/DL
HCT VFR BLD AUTO: 47.1 % (ref 35–47)
HGB BLD-MCNC: 15.7 G/DL (ref 11.7–15.7)
HGB UR QL STRIP: NEGATIVE
IMM GRANULOCYTES # BLD: 0 10E9/L (ref 0–0.4)
IMM GRANULOCYTES NFR BLD: 0.4 %
KETONES UR STRIP-MCNC: >80 MG/DL
LEUKOCYTE ESTERASE UR QL STRIP: NEGATIVE
LIPASE SERPL-CCNC: 17 U/L (ref 11–82)
LYMPHOCYTES # BLD AUTO: 1.6 10E9/L (ref 0.8–5.3)
LYMPHOCYTES NFR BLD AUTO: 23.3 %
MCH RBC QN AUTO: 28.5 PG (ref 26.5–33)
MCHC RBC AUTO-ENTMCNC: 33.3 G/DL (ref 31.5–36.5)
MCV RBC AUTO: 86 FL (ref 78–100)
MONOCYTES # BLD AUTO: 0.7 10E9/L (ref 0–1.3)
MONOCYTES NFR BLD AUTO: 9.5 %
NEUTROPHILS # BLD AUTO: 4.5 10E9/L (ref 1.6–8.3)
NEUTROPHILS NFR BLD AUTO: 65.5 %
NITRATE UR QL: NEGATIVE
PH UR STRIP: 5.5 PH (ref 5–9)
PLATELET # BLD AUTO: 196 10E9/L (ref 150–450)
POTASSIUM SERPL-SCNC: 3.7 MMOL/L (ref 3.5–5.1)
PROT SERPL-MCNC: 7 G/DL (ref 6.4–8.9)
RBC # BLD AUTO: 5.51 10E12/L (ref 3.8–5.2)
RBC #/AREA URNS AUTO: NORMAL /HPF
SODIUM SERPL-SCNC: 138 MMOL/L (ref 134–144)
SOURCE: ABNORMAL
SP GR UR STRIP: >1.03 (ref 1–1.03)
UROBILINOGEN UR STRIP-ACNC: 0.2 EU/DL (ref 0.2–1)
WBC # BLD AUTO: 6.8 10E9/L (ref 4–11)
WBC #/AREA URNS AUTO: NORMAL /HPF

## 2018-12-21 PROCEDURE — 83690 ASSAY OF LIPASE: CPT | Performed by: EMERGENCY MEDICINE

## 2018-12-21 PROCEDURE — 96374 THER/PROPH/DIAG INJ IV PUSH: CPT | Performed by: EMERGENCY MEDICINE

## 2018-12-21 PROCEDURE — 81001 URINALYSIS AUTO W/SCOPE: CPT | Performed by: EMERGENCY MEDICINE

## 2018-12-21 PROCEDURE — 96376 TX/PRO/DX INJ SAME DRUG ADON: CPT | Performed by: EMERGENCY MEDICINE

## 2018-12-21 PROCEDURE — 85025 COMPLETE CBC W/AUTO DIFF WBC: CPT | Performed by: EMERGENCY MEDICINE

## 2018-12-21 PROCEDURE — 25000128 H RX IP 250 OP 636: Performed by: EMERGENCY MEDICINE

## 2018-12-21 PROCEDURE — 80053 COMPREHEN METABOLIC PANEL: CPT | Performed by: EMERGENCY MEDICINE

## 2018-12-21 PROCEDURE — 96361 HYDRATE IV INFUSION ADD-ON: CPT | Performed by: EMERGENCY MEDICINE

## 2018-12-21 PROCEDURE — 99284 EMERGENCY DEPT VISIT MOD MDM: CPT | Mod: 25 | Performed by: EMERGENCY MEDICINE

## 2018-12-21 PROCEDURE — 80320 DRUG SCREEN QUANTALCOHOLS: CPT | Performed by: EMERGENCY MEDICINE

## 2018-12-21 PROCEDURE — 96375 TX/PRO/DX INJ NEW DRUG ADDON: CPT | Performed by: EMERGENCY MEDICINE

## 2018-12-21 PROCEDURE — 99283 EMERGENCY DEPT VISIT LOW MDM: CPT | Mod: Z6 | Performed by: EMERGENCY MEDICINE

## 2018-12-21 PROCEDURE — 36415 COLL VENOUS BLD VENIPUNCTURE: CPT | Performed by: EMERGENCY MEDICINE

## 2018-12-21 RX ORDER — ONDANSETRON 2 MG/ML
4 INJECTION INTRAMUSCULAR; INTRAVENOUS EVERY 30 MIN PRN
Status: DISCONTINUED | OUTPATIENT
Start: 2018-12-21 | End: 2018-12-21 | Stop reason: HOSPADM

## 2018-12-21 RX ORDER — ONDANSETRON 4 MG/1
4 TABLET, ORALLY DISINTEGRATING ORAL EVERY 8 HOURS PRN
Qty: 10 TABLET | Refills: 0 | Status: SHIPPED | OUTPATIENT
Start: 2018-12-21 | End: 2019-05-31

## 2018-12-21 RX ORDER — SODIUM CHLORIDE 9 MG/ML
INJECTION, SOLUTION INTRAVENOUS CONTINUOUS
Status: DISCONTINUED | OUTPATIENT
Start: 2018-12-21 | End: 2018-12-21 | Stop reason: HOSPADM

## 2018-12-21 RX ORDER — KETOROLAC TROMETHAMINE 15 MG/ML
15 INJECTION, SOLUTION INTRAMUSCULAR; INTRAVENOUS ONCE
Status: COMPLETED | OUTPATIENT
Start: 2018-12-21 | End: 2018-12-21

## 2018-12-21 RX ADMIN — KETOROLAC TROMETHAMINE 15 MG: 15 INJECTION, SOLUTION INTRAMUSCULAR; INTRAVENOUS at 09:15

## 2018-12-21 RX ADMIN — SODIUM CHLORIDE: 900 INJECTION, SOLUTION INTRAVENOUS at 09:15

## 2018-12-21 RX ADMIN — ONDANSETRON 4 MG: 2 INJECTION INTRAMUSCULAR; INTRAVENOUS at 09:15

## 2018-12-21 RX ADMIN — ONDANSETRON 4 MG: 2 INJECTION INTRAMUSCULAR; INTRAVENOUS at 12:26

## 2018-12-21 ASSESSMENT — ENCOUNTER SYMPTOMS
SHORTNESS OF BREATH: 0
DYSURIA: 0
ABDOMINAL PAIN: 0
BLOOD IN STOOL: 0
BACK PAIN: 0
WOUND: 0
VOMITING: 1
DIARRHEA: 1
CONFUSION: 0
NAUSEA: 1
CHILLS: 0
ADENOPATHY: 0
BRUISES/BLEEDS EASILY: 0
HEADACHES: 1
FEVER: 0
CHEST TIGHTNESS: 0

## 2018-12-21 ASSESSMENT — MIFFLIN-ST. JEOR: SCORE: 1233.6

## 2018-12-21 NOTE — ED AVS SNAPSHOT
[facility]  [address]  [place] [address]  Phone:  [phone]  Fax:  [phone]                                    Deidre Braga   MRN: [id number]    Department:  [facility]   Date of Visit:  12/21/2018           After Visit Summary Signature Page    I have received my discharge instructions, and my questions have been answered. I have discussed any challenges I see with this plan with the nurse or doctor.    ..........................................................................................................................................  Patient/Patient Representative Signature      ..........................................................................................................................................  Patient Representative Print Name and Relationship to Patient    ..................................................               ................................................  Date                                   Time    ..........................................................................................................................................  Reviewed by Signature/Title    ...................................................              ..............................................  Date                                               Time          22EPIC Rev 08/18

## 2018-12-21 NOTE — ED TRIAGE NOTES
Pt here by meds 1 into bay [facility], pt has been sick x 2 days with nausea, vomiting and diarrhea, pt is feeling weak with mild SOB, pt c/o a headache
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
## 2019-01-07 RX ORDER — ESTRADIOL 0.1 MG/G
CREAM VAGINAL
Qty: 42.5 G | OUTPATIENT
Start: 2019-01-07

## 2019-01-07 NOTE — TELEPHONE ENCOUNTER
[facility] #[id number] sent Rx request for the following:    ESTRADIOL 0.01% VAGINAL CREAM  Sig: INSERT INTO THE VAGINA AT BEDTIME. NIGHTLY FOR TWO WEEKS, THEN TWICEWEEKLY, 1 GRAM PER DOSE.    Last Office Visit: Dr. Tilley in March 2017, Noted Pt has never seen Dr. Sánchez (who refill came addressed to)  Future Office visit: None.    Routing refill request to provider for review/approval because:  Hormone Replacement Therapy Failed1/7 8:43 AM   Recent (12 mo) or future (30 days) visit within the authorizing provider's specialty    Medication is active on med list     Noted Patient's PCP is listed as Jana Hurst at [facility] in [place]. Chart shows recent refills by Satish Do at [facility]. Refill request refused, with note to pharmacy. Unable to complete prescription refill per RN Medication Refill Policy. Ann Keys RN .............. 1/7/2019  8:44 AM

## 2019-05-13 ENCOUNTER — OFFICE VISIT (OUTPATIENT)
Dept: ORTHOPEDICS | Facility: OTHER | Age: 59
End: 2019-05-13
Attending: ORTHOPAEDIC SURGERY
Payer: MEDICARE

## 2019-05-13 DIAGNOSIS — Z00.00 ROUTINE GENERAL MEDICAL EXAMINATION AT A HEALTH CARE FACILITY: Primary | ICD-10-CM

## 2019-05-13 PROCEDURE — G0463 HOSPITAL OUTPT CLINIC VISIT: HCPCS
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
## 2019-05-23 ENCOUNTER — TRANSFERRED RECORDS (OUTPATIENT)
Dept: HEALTH INFORMATION MANAGEMENT | Facility: OTHER | Age: 59
End: 2019-05-23

## 2019-05-31 ENCOUNTER — ANESTHESIA EVENT (OUTPATIENT)
Dept: SURGERY | Facility: OTHER | Age: 59
End: 2019-05-31
Payer: MEDICARE

## 2019-05-31 RX ORDER — DULOXETIN HYDROCHLORIDE 60 MG/1
60 CAPSULE, DELAYED RELEASE ORAL 2 TIMES DAILY
Status: ON HOLD | COMMUNITY
End: 2019-12-23

## 2019-05-31 RX ORDER — FUROSEMIDE 20 MG
20 TABLET ORAL DAILY
COMMUNITY

## 2019-05-31 RX ORDER — PREGABALIN 300 MG/1
CAPSULE ORAL 2 TIMES DAILY
Status: ON HOLD | COMMUNITY
End: 2019-12-17

## 2019-06-03 ENCOUNTER — HOSPITAL ENCOUNTER (OUTPATIENT)
Facility: OTHER | Age: 59
Discharge: HOME | End: 2019-06-03
Attending: ORTHOPAEDIC SURGERY | Admitting: ORTHOPAEDIC SURGERY
Payer: MEDICARE

## 2019-06-03 ENCOUNTER — ANESTHESIA (OUTPATIENT)
Dept: SURGERY | Facility: OTHER | Age: 59
End: 2019-06-03
Payer: MEDICARE

## 2019-06-03 VITALS
BODY MASS INDEX: 22.98 KG/M2 | HEIGHT: 64 IN | RESPIRATION RATE: 16 BRPM | WEIGHT: 134.6 LBS | TEMPERATURE: 97.1 F | OXYGEN SATURATION: 96 % | DIASTOLIC BLOOD PRESSURE: 70 MMHG | HEART RATE: 68 BPM | SYSTOLIC BLOOD PRESSURE: 104 MMHG

## 2019-06-03 PROCEDURE — 37000009 ZZH ANESTHESIA TECHNICAL FEE, EACH ADDTL 15 MIN: Performed by: ORTHOPAEDIC SURGERY

## 2019-06-03 PROCEDURE — 40000306 ZZH STATISTIC PRE PROC ASSESS II: Performed by: ORTHOPAEDIC SURGERY

## 2019-06-03 PROCEDURE — 25000128 H RX IP 250 OP 636: Performed by: NURSE ANESTHETIST, CERTIFIED REGISTERED

## 2019-06-03 PROCEDURE — 25800030 ZZH RX IP 258 OP 636: Performed by: NURSE ANESTHETIST, CERTIFIED REGISTERED

## 2019-06-03 PROCEDURE — 27210794 ZZH OR GENERAL SUPPLY STERILE: Performed by: ORTHOPAEDIC SURGERY

## 2019-06-03 PROCEDURE — 27033 ARTHRT HIP EXPL/RMV LOOSE/FB: CPT | Performed by: NURSE ANESTHETIST, CERTIFIED REGISTERED

## 2019-06-03 PROCEDURE — 36000058 ZZH SURGERY LEVEL 3 EA 15 ADDTL MIN: Performed by: ORTHOPAEDIC SURGERY

## 2019-06-03 PROCEDURE — 36000056 ZZH SURGERY LEVEL 3 1ST 30 MIN: Performed by: ORTHOPAEDIC SURGERY

## 2019-06-03 PROCEDURE — 25000125 ZZHC RX 250: Performed by: ORTHOPAEDIC SURGERY

## 2019-06-03 PROCEDURE — 88305 TISSUE EXAM BY PATHOLOGIST: CPT

## 2019-06-03 PROCEDURE — 25000128 H RX IP 250 OP 636: Performed by: ORTHOPAEDIC SURGERY

## 2019-06-03 PROCEDURE — 71000014 ZZH RECOVERY PHASE 1 LEVEL 2 FIRST HR: Performed by: ORTHOPAEDIC SURGERY

## 2019-06-03 PROCEDURE — 37000008 ZZH ANESTHESIA TECHNICAL FEE, 1ST 30 MIN: Performed by: ORTHOPAEDIC SURGERY

## 2019-06-03 PROCEDURE — 25000125 ZZHC RX 250: Performed by: NURSE ANESTHETIST, CERTIFIED REGISTERED

## 2019-06-03 RX ORDER — KETAMINE HYDROCHLORIDE 50 MG/ML
INJECTION, SOLUTION INTRAMUSCULAR; INTRAVENOUS PRN
Status: DISCONTINUED | OUTPATIENT
Start: 2019-06-03 | End: 2019-06-03

## 2019-06-03 RX ORDER — SODIUM CHLORIDE, SODIUM LACTATE, POTASSIUM CHLORIDE, CALCIUM CHLORIDE 600; 310; 30; 20 MG/100ML; MG/100ML; MG/100ML; MG/100ML
INJECTION, SOLUTION INTRAVENOUS CONTINUOUS
Status: DISCONTINUED | OUTPATIENT
Start: 2019-06-03 | End: 2019-06-03 | Stop reason: HOSPADM

## 2019-06-03 RX ORDER — ONDANSETRON 2 MG/ML
4 INJECTION INTRAMUSCULAR; INTRAVENOUS EVERY 30 MIN PRN
Status: DISCONTINUED | OUTPATIENT
Start: 2019-06-03 | End: 2019-06-03 | Stop reason: HOSPADM

## 2019-06-03 RX ORDER — MEPERIDINE HYDROCHLORIDE 50 MG/ML
12.5 INJECTION INTRAMUSCULAR; INTRAVENOUS; SUBCUTANEOUS
Status: DISCONTINUED | OUTPATIENT
Start: 2019-06-03 | End: 2019-06-03 | Stop reason: HOSPADM

## 2019-06-03 RX ORDER — PHENYLEPHRINE HYDROCHLORIDE 10 MG/ML
INJECTION INTRAVENOUS PRN
Status: DISCONTINUED | OUTPATIENT
Start: 2019-06-03 | End: 2019-06-03

## 2019-06-03 RX ORDER — HYDROMORPHONE HYDROCHLORIDE 1 MG/ML
.3-.5 INJECTION, SOLUTION INTRAMUSCULAR; INTRAVENOUS; SUBCUTANEOUS EVERY 10 MIN PRN
Status: DISCONTINUED | OUTPATIENT
Start: 2019-06-03 | End: 2019-06-03 | Stop reason: HOSPADM

## 2019-06-03 RX ORDER — GLYCOPYRROLATE 0.2 MG/ML
INJECTION, SOLUTION INTRAMUSCULAR; INTRAVENOUS PRN
Status: DISCONTINUED | OUTPATIENT
Start: 2019-06-03 | End: 2019-06-03

## 2019-06-03 RX ORDER — FENTANYL CITRATE 50 UG/ML
25-50 INJECTION, SOLUTION INTRAMUSCULAR; INTRAVENOUS
Status: DISCONTINUED | OUTPATIENT
Start: 2019-06-03 | End: 2019-06-03 | Stop reason: HOSPADM

## 2019-06-03 RX ORDER — FENTANYL CITRATE 50 UG/ML
50 INJECTION, SOLUTION INTRAMUSCULAR; INTRAVENOUS ONCE
Status: COMPLETED | OUTPATIENT
Start: 2019-06-03 | End: 2019-06-03

## 2019-06-03 RX ORDER — ONDANSETRON 4 MG/1
4 TABLET, ORALLY DISINTEGRATING ORAL EVERY 30 MIN PRN
Status: DISCONTINUED | OUTPATIENT
Start: 2019-06-03 | End: 2019-06-03 | Stop reason: HOSPADM

## 2019-06-03 RX ORDER — NALOXONE HYDROCHLORIDE 0.4 MG/ML
.1-.4 INJECTION, SOLUTION INTRAMUSCULAR; INTRAVENOUS; SUBCUTANEOUS
Status: DISCONTINUED | OUTPATIENT
Start: 2019-06-03 | End: 2019-06-03 | Stop reason: HOSPADM

## 2019-06-03 RX ORDER — PROPOFOL 10 MG/ML
INJECTION, EMULSION INTRAVENOUS CONTINUOUS PRN
Status: DISCONTINUED | OUTPATIENT
Start: 2019-06-03 | End: 2019-06-03

## 2019-06-03 RX ORDER — LIDOCAINE HYDROCHLORIDE 20 MG/ML
INJECTION, SOLUTION INFILTRATION; PERINEURAL PRN
Status: DISCONTINUED | OUTPATIENT
Start: 2019-06-03 | End: 2019-06-03

## 2019-06-03 RX ORDER — NEOSTIGMINE METHYLSULFATE 1 MG/ML
VIAL (ML) INJECTION PRN
Status: DISCONTINUED | OUTPATIENT
Start: 2019-06-03 | End: 2019-06-03

## 2019-06-03 RX ORDER — CEFAZOLIN SODIUM 2 G/100ML
2 INJECTION, SOLUTION INTRAVENOUS
Status: COMPLETED | OUTPATIENT
Start: 2019-06-03 | End: 2019-06-03

## 2019-06-03 RX ORDER — PROPOFOL 10 MG/ML
INJECTION, EMULSION INTRAVENOUS PRN
Status: DISCONTINUED | OUTPATIENT
Start: 2019-06-03 | End: 2019-06-03

## 2019-06-03 RX ORDER — FENTANYL CITRATE 50 UG/ML
INJECTION, SOLUTION INTRAMUSCULAR; INTRAVENOUS PRN
Status: DISCONTINUED | OUTPATIENT
Start: 2019-06-03 | End: 2019-06-03

## 2019-06-03 RX ORDER — LIDOCAINE 40 MG/G
CREAM TOPICAL
Status: DISCONTINUED | OUTPATIENT
Start: 2019-06-03 | End: 2019-06-03 | Stop reason: HOSPADM

## 2019-06-03 RX ORDER — IBUPROFEN 600 MG/1
600 TABLET, FILM COATED ORAL
Status: DISCONTINUED | OUTPATIENT
Start: 2019-06-03 | End: 2019-06-03 | Stop reason: HOSPADM

## 2019-06-03 RX ORDER — CEFAZOLIN SODIUM 1 G/50ML
1 INJECTION, SOLUTION INTRAVENOUS SEE ADMIN INSTRUCTIONS
Status: DISCONTINUED | OUTPATIENT
Start: 2019-06-03 | End: 2019-06-03 | Stop reason: HOSPADM

## 2019-06-03 RX ORDER — BUPIVACAINE HYDROCHLORIDE AND EPINEPHRINE 5; 5 MG/ML; UG/ML
INJECTION, SOLUTION EPIDURAL; INTRACAUDAL; PERINEURAL PRN
Status: DISCONTINUED | OUTPATIENT
Start: 2019-06-03 | End: 2019-06-03 | Stop reason: HOSPADM

## 2019-06-03 RX ORDER — ONDANSETRON 2 MG/ML
INJECTION INTRAMUSCULAR; INTRAVENOUS PRN
Status: DISCONTINUED | OUTPATIENT
Start: 2019-06-03 | End: 2019-06-03

## 2019-06-03 RX ORDER — DEXAMETHASONE SODIUM PHOSPHATE 4 MG/ML
INJECTION, SOLUTION INTRA-ARTICULAR; INTRALESIONAL; INTRAMUSCULAR; INTRAVENOUS; SOFT TISSUE PRN
Status: DISCONTINUED | OUTPATIENT
Start: 2019-06-03 | End: 2019-06-03

## 2019-06-03 RX ADMIN — LIDOCAINE HYDROCHLORIDE 60 MG: 20 INJECTION, SOLUTION INFILTRATION; PERINEURAL at 17:05

## 2019-06-03 RX ADMIN — CEFAZOLIN SODIUM 2 G: 2 INJECTION, SOLUTION INTRAVENOUS at 16:59

## 2019-06-03 RX ADMIN — FENTANYL CITRATE 50 MCG: 50 INJECTION, SOLUTION INTRAMUSCULAR; INTRAVENOUS at 18:12

## 2019-06-03 RX ADMIN — MIDAZOLAM 2 MG: 1 INJECTION INTRAMUSCULAR; INTRAVENOUS at 17:03

## 2019-06-03 RX ADMIN — DEXAMETHASONE SODIUM PHOSPHATE 4 MG: 4 INJECTION, SOLUTION INTRA-ARTICULAR; INTRALESIONAL; INTRAMUSCULAR; INTRAVENOUS; SOFT TISSUE at 17:17

## 2019-06-03 RX ADMIN — ONDANSETRON 4 MG: 2 INJECTION INTRAMUSCULAR; INTRAVENOUS at 17:05

## 2019-06-03 RX ADMIN — PHENYLEPHRINE HYDROCHLORIDE 100 MCG: 10 INJECTION INTRAVENOUS at 17:39

## 2019-06-03 RX ADMIN — KETAMINE HYDROCHLORIDE 25 MG: 50 INJECTION, SOLUTION INTRAMUSCULAR; INTRAVENOUS at 17:03

## 2019-06-03 RX ADMIN — FENTANYL CITRATE 50 MCG: 50 INJECTION, SOLUTION INTRAMUSCULAR; INTRAVENOUS at 17:03

## 2019-06-03 RX ADMIN — FENTANYL CITRATE 50 MCG: 50 INJECTION, SOLUTION INTRAMUSCULAR; INTRAVENOUS at 18:20

## 2019-06-03 RX ADMIN — PROPOFOL 225 MCG/KG/MIN: 10 INJECTION, EMULSION INTRAVENOUS at 17:05

## 2019-06-03 RX ADMIN — NEOSTIGMINE METHYLSULFATE 4 MG: 1 INJECTION INTRAVENOUS at 17:42

## 2019-06-03 RX ADMIN — FENTANYL CITRATE 50 MCG: 50 INJECTION, SOLUTION INTRAMUSCULAR; INTRAVENOUS at 13:30

## 2019-06-03 RX ADMIN — ROCURONIUM BROMIDE 30 MG: 10 INJECTION INTRAVENOUS at 17:05

## 2019-06-03 RX ADMIN — GLYCOPYRROLATE 0.8 MG: 0.2 INJECTION, SOLUTION INTRAMUSCULAR; INTRAVENOUS at 17:42

## 2019-06-03 RX ADMIN — SODIUM CHLORIDE, POTASSIUM CHLORIDE, SODIUM LACTATE AND CALCIUM CHLORIDE: 600; 310; 30; 20 INJECTION, SOLUTION INTRAVENOUS at 13:14

## 2019-06-03 RX ADMIN — PHENYLEPHRINE HYDROCHLORIDE 100 MCG: 10 INJECTION INTRAVENOUS at 17:27

## 2019-06-03 RX ADMIN — PROPOFOL 200 MG: 10 INJECTION, EMULSION INTRAVENOUS at 17:05

## 2019-06-03 ASSESSMENT — COPD QUESTIONNAIRES
CAT_SEVERITY: MILD
COPD: 1

## 2019-06-03 ASSESSMENT — LIFESTYLE VARIABLES: TOBACCO_USE: 1

## 2019-06-03 ASSESSMENT — MIFFLIN-ST. JEOR: SCORE: 1165.54

## 2019-06-03 NOTE — PROGRESS NOTES
PACU Transfer Note    Deidre Braga was transferred to [facility] via cart.  Equipment used for transport:  none.  Accompanied by:  GALE Xiong RN  Prescriptions were: not given    PACU Respiratory Event Documentation     1) Episodes of Apnea greater than or equal to 10 seconds: no    2) Bradypnea - less than 8 breaths per minute: no    3) Pain score on 0 to 10 scale: 3/10    4) Pain-sedation mismatch (yes or no): no    5) Repeated 02 desaturation less than 90% (yes or no): no    Anesthesia notified? (yes or no): no    Any of the above events occuring repeatedly in separate 30 minute intervals may be considered recurrent PACU respiratory events.    Patient stable and meets phase 1 discharge criteria for transport from PACU.

## 2019-06-03 NOTE — ANESTHESIA CARE TRANSFER NOTE
Patient: Deidre Braga    Procedure(s):  Right Hip Loose Body Removal    Diagnosis: right hip pain  Diagnosis Additional Information: No value filed.    Anesthesia Type:   General, ETT     Note:  Airway :Face Mask  Patient transferred to:PACU  Handoff Report: Identifed the Patient, Identified the Reponsible Provider, Reviewed the pertinent medical history, Discussed the surgical course, Reviewed Intra-OP anesthesia mangement and issues during anesthesia, Set expectations for post-procedure period and Allowed opportunity for questions and acknowledgement of understanding      Vitals: (Last set prior to Anesthesia Care Transfer)    CRNA VITALS  6/3/2019 1729 - 6/3/2019 1801      6/3/2019             Resp Rate (set):  6  (Abnormal)                 Electronically Signed By: SHILPA LUONG CRNA  Antonietta 3, 2019  6:01 PM

## 2019-06-03 NOTE — ANESTHESIA POSTPROCEDURE EVALUATION
Patient: Deidre Braga    Procedure(s):  Right Hip Loose Body Removal    Diagnosis:right hip pain  Diagnosis Additional Information: No value filed.    Anesthesia Type:  General, ETT    Note:  Anesthesia Post Evaluation    Patient location during evaluation: PACU  Patient participation: Able to fully participate in evaluation  Level of consciousness: awake and alert  Pain management: adequate  Airway patency: patent  Cardiovascular status: acceptable  Respiratory status: acceptable  Hydration status: acceptable  PONV: none     Anesthetic complications: None          Last vitals:  Vitals:    06/03/19 1820 06/03/19 1825 06/03/19 1830   BP: 109/78 92/78 96/67   Pulse: 71 75 70   Resp:      Temp: 97.4  F (36.3  C)     SpO2: 97% 96% 96%         Electronically Signed By: SHILPA LUONG CRNA  Antonietta 3, 2019  6:35 PM
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
## 2019-06-03 NOTE — BRIEF OP NOTE
[facility]    Brief Operative Note    Pre-operative diagnosis: right hip pain  Post-operative diagnosis loose bodies right hip  Procedure: Procedure(s):  Right Hip Loose Body Removal  Surgeon: Surgeon(s) and Role:     * Con Harman MD - Primary  Anesthesia: General   Estimated blood loss: None  Drains: None  Specimens:   ID Type Source Tests Collected by Time Destination   A : Foreign Body Right Hip Other (specify in comments) Hip, Right SURGICAL PATHOLOGY EXAM Con Harman MD 6/3/2019  5:24 PM      Findings:   None.  Complications: None.  Implants:  * No implants in log *

## 2019-06-04 NOTE — PROGRESS NOTES
Prior to discharge - Pt ate 2 pieces of peanut butter toast, 2 cups of coffee and ice water.  Was able to ambulate around room and to the bathroom where she voided 150 mL.  Pt steady on feet.  No complaints of dizziness or lightheadedness.  Ann De La Torre RN............................ 6/3/2019 9:03 PM

## 2019-06-04 NOTE — PROGRESS NOTES
NSG DISCHARGE NOTE    Patient discharged to home at 8:52 PM via wheel chair. Accompanied by other: friend, Sheela and staff. Discharge instructions reviewed with patient, opportunity offered to ask questions. Prescriptions - None ordered for discharge. All belongings sent with patient.    Ann De La Torre

## 2019-06-04 NOTE — OP NOTE
Procedure Date: 06/03/2019      DATE OF SERVICE:  06/03/2019.      PREOPERATIVE DIAGNOSIS:  Loose bodies, right hip.      POSTOPERATIVE DIAGNOSIS:  Loose bodies, right hip.      SURGEON:  Con Harman MD      ASSISTANT:  None.      ANESTHESIA:  General.      INDICATIONS:  Deidre Braga is a 59-year-old female with some subcutaneous loose bodies in her hip.  These were causing her significant pain, especially when she would roll over at night on them and they would actually move around her hip.  She has significant pain with these and wanted to have them removed.  She has had this done in the past.  All the risks and benefits of surgical intervention were discussed with her and she wished to proceed.      DESCRIPTION OF PROCEDURE:  The patient was taken to the operating room.  After adequate induction of anesthesia, was positioned, prepped and draped in the usual sterile fashion in the operative field.  A universal protocol timeout was initiated and once all in the room were in agreement, an incision was made overlying the right hip.  This was carried down through the subcutaneous tissue and the loose bodies were immediately noted.  These were hard, smooth, yellow nodules that actually resembled fat.  These were removed from the subcutaneous bursa. Noted over the hip, there was a nice shiny lining to this as well.  There is fat underlying it.  We tried to excise as much of the bursa as possible, but we were unable to get all of it, due to its extensive nature.  The wound was copiously irrigated.  Some deep stitches were placed to lower the dead space and try and keep the nodules from reforming.  At this point, we closed the skin with 2-0 Vicryl and a running Monocryl.  The patient tolerated the procedure well.  A sterile dressing was applied and she was placed back on the bed, taken to the postanesthesia care unit.  She tolerated the procedure well.  Counts were correct at the end of the procedure.         CON  CHACHA SIDDIQUI MD             D: 2019   T: 2019   MT: LEWIS      Name:     LIZZ PASCAL   MRN:      [id number]        Account:        [id number]   :      1960           Procedure Date: 2019      Document: [id number]

## 2019-06-04 NOTE — PROGRESS NOTES
"NS ADMISSION NOTE    Patient admitted to room 353 at approximately 1920 via cart from surgery. Patient was accompanied by transport tech.     Verbal SBAR report received from KERRI Farfan (Ruby VALERO RN received report) prior to patient arrival.     Patient trasferred to bed via self. Patient alert and oriented X 4. Pain is controlled without any medications. 0-10 Pain Scale: 4. Admission vital signs: Blood pressure 104/70, pulse 68, temperature 97.1  F (36.2  C), temperature source Tympanic, resp. rate 16, height 1.626 m (5' 4\"), weight 61.1 kg (134 lb 9.6 oz), SpO2 96 %. Patient was oriented to plan of care, call light, bed controls, tv, telephone, bathroom and visiting hours.     Risk Assessment    The following safety risks were identified during admission: fall and skin. Yellow risk band applied: YES.     Skin Initial Assessment    Garth Risk Assessment  Sensory Perception: 4-->no impairment  Moisture: 4-->rarely moist  Activity: 4-->walks frequently  Mobility: 4-->no limitation  Nutrition: 4-->excellent  Friction and Shear: 3-->no apparent problem  Garth Score: 22    Ann De La Torre    "

## 2019-06-14 DIAGNOSIS — M25.561 RIGHT KNEE PAIN, UNSPECIFIED CHRONICITY: Primary | ICD-10-CM

## 2019-06-17 ENCOUNTER — HOSPITAL ENCOUNTER (OUTPATIENT)
Dept: GENERAL RADIOLOGY | Facility: OTHER | Age: 59
Discharge: HOME | End: 2019-06-17
Attending: ORTHOPAEDIC SURGERY | Admitting: ORTHOPAEDIC SURGERY
Payer: MEDICARE

## 2019-06-17 ENCOUNTER — OFFICE VISIT (OUTPATIENT)
Dept: ORTHOPEDICS | Facility: OTHER | Age: 59
End: 2019-06-17
Attending: ORTHOPAEDIC SURGERY
Payer: MEDICARE

## 2019-06-17 DIAGNOSIS — M25.561 RIGHT KNEE PAIN, UNSPECIFIED CHRONICITY: Primary | ICD-10-CM

## 2019-06-17 DIAGNOSIS — M25.561 RIGHT KNEE PAIN, UNSPECIFIED CHRONICITY: ICD-10-CM

## 2019-06-17 PROCEDURE — G0463 HOSPITAL OUTPT CLINIC VISIT: HCPCS

## 2019-06-17 PROCEDURE — 99024 POSTOP FOLLOW-UP VISIT: CPT | Performed by: ORTHOPAEDIC SURGERY

## 2019-06-17 PROCEDURE — 73560 X-RAY EXAM OF KNEE 1 OR 2: CPT | Mod: LT

## 2019-06-19 ENCOUNTER — HOSPITAL ENCOUNTER (OUTPATIENT)
Dept: MRI IMAGING | Facility: OTHER | Age: 59
Discharge: HOME | End: 2019-06-19
Attending: ORTHOPAEDIC SURGERY | Admitting: FAMILY MEDICINE
Payer: MEDICARE

## 2019-06-19 DIAGNOSIS — M25.561 RIGHT KNEE PAIN, UNSPECIFIED CHRONICITY: ICD-10-CM

## 2019-06-19 PROCEDURE — 73721 MRI JNT OF LWR EXTRE W/O DYE: CPT | Mod: RT

## 2019-06-21 NOTE — PROGRESS NOTES
CHIEF COMPLAINT: Loose Body Removal Right Hip Postop, Right Knee Pain    PROBLEMS:   Patient has no noted problems.    PATIENT REPORTED MEDICATIONS:  MAGNESIUM OXIDE 400 MG ORAL TABLET (MAGNESIUM OXIDE) ; Route: ORAL  DULOXETINE HCL 60 MG ORAL CAPSULE DELAYED RELEASE PARTICLES (DULOXETINE HCL) ; Route: ORAL  CALCIUM-VITAMIN D 600-400 MG-UNIT ORAL TABLET (CALCIUM CARB-CHOLECALCIFEROL) ; Route: ORAL  NALTREXONE HCL 50 MG ORAL TABLET (NALTREXONE HCL) ; Route: ORAL  BUPROPION HCL ER (XL) 300 MG ORAL TABLET EXTENDED RELEASE 24 HOUR (BUPROPION HCL) ; Route: ORAL  SYMBICORT 160-4.5 MCG/ACT INHALATION AEROSOL (BUDESONIDE-FORMOTEROL FUMARATE) ; Route: INHALATION  ALENDRONATE SODIUM 35 MG ORAL TABLET (ALENDRONATE SODIUM) ; Route: ORAL  FUROSEMIDE 20 MG ORAL TABLET (FUROSEMIDE) ; Route: ORAL  ETODOLAC 300 MG ORAL CAPSULE (ETODOLAC) ; Route: ORAL  COMBIVENT RESPIMAT  MCG/ACT INHALATION AEROSOL SOLUTION (IPRATROPIUM-ALBUTEROL) ; Route: INHALATION  NITROFURANTOIN MACROCRYSTAL 100 MG ORAL CAPSULE (NITROFURANTOIN MACROCRYSTAL) ; Route: ORAL  OMEPRAZOLE 20 MG ORAL CAPSULE DELAYED RELEASE (OMEPRAZOLE) ; Route: ORAL  ESTRACE 0.1 MG/GM VAGINAL CREAM (ESTRADIOL) ; Route: VAGINAL  SUMATRIPTAN SUCCINATE 50 MG ORAL TABLET (SUMATRIPTAN SUCCINATE) ; Route: ORAL  NICOTROL 10 MG INHALATION INHALER (NICOTINE) ; Route: INHALATION  ALBUTEROL SULFATE  (90 BASE) MCG/ACT INHALATION AEROSOL SOLUTION (ALBUTEROL SULFATE) ; Route: INHALATION  CYCLOBENZAPRINE HCL 5 MG ORAL TABLET (CYCLOBENZAPRINE HCL) ; Route: ORAL  LYRICA 150 MG ORAL CAPSULE (PREGABALIN) ; Route: ORAL  HYDROXYZINE HCL 50 MG ORAL TABLET (HYDROXYZINE HCL) ; Route: ORAL    PATIENT REPORTED ALLERGIES:  GABAPENTIN (GABAPENTIN) (SevereReaction: Angioedema and Tardive Dyskinesia)  * SULFA DRUGS (SevereReaction: Anaphylaxis)  BACLOFEN (BACLOFEN) (SevereReaction: Confusion)  * NAPROXEN (SevereReaction: Edema, Swelling)  TRAMADOL HCL (TRAMADOL HCL TABS) (SevereReaction:  Confusion)      HISTORY OF PRESENT ILLNESS:    Deidre Braga is a 59-year-old female who is two weeks status post loose body removal from the right hip.  She has a subcutaneous bursa that had what appeared to be sclerotic fat nodules in it that were subsequently removed.  She has healed up from this nicely and her hip no longer bothers her, but she is having a little bit of right knee pain.  This is primarily on the inside of the knee and it has been bothering her for about six weeks now.    PAST MEDICAL HISTORY:    Osteoarthritis  Rheumatoid Arthritis  Degenerative Joint Disease  Fibromyalgia  Osteoporosis  Osteopenia  Chronic Pain  Alcoholism In Remission  Anemia  Asthma  Marroquin's Esophagus Without Dysplasia 06/04/2013  Colon Polyps  Compression Fracture L1 Lumbar Vertebra  COPD  Major Depression, Chronic  Diverticulosis  Eating Disorder - Bulimia  Esophageal Candidiasis 03/28/2014  Fracture of Right Shoulder  GERD  Hernia of Unspecified Site of Abdominal Cavity  Heterotopic Ossification of Bone 06/24/2013  Irritable Bowel Syndrome  Nephritis  Nicotine Addiction  Non-Union of Fracture 06/24/2013  Ovarian Cyst, Multiple  Sprain and Strain of Unspecified Site of Shoulder and Upper Arm 02/19/2014    PAST ORTHOPEDIC SURGICAL HISTORY:    Right Shoulder x2  Excision of Multiple Calcifications Left Hip Region  L4-L5 PLIF  Acromioplasty Acromionectomy Partial w/wo Coracoacromial Lig Release, Right, 07/2013    PAST SURGICAL HISTORY:    Hysterectomy  Tonsillectomy  Diagnostic Lap x4  Appendectomy  Bladder Repair (Open)  Colonoscopy  ECT of Back  Excision Subcutaneous Calcifications Right Lateral Buttock  Polypectomy  Teeth Extraction, All Teeth  Upper Endoscopy 06/11/2013 and 03/06/2014    FAMILY HISTORY:    Father:  Strokes, Heart Attacks, High Cholesterol  Mother:  Diabetes, Arthritis, Fibromyalgia, Chronic Pain, High Cholesterol, Hypertension, Heart Disease  Brother:  Heart Attack, Defibrillator, Alcoholism, Passed  Away    SOCIAL HISTORY:   Occupation:  LPN, Farmer,   Marital Status:    Alcohol Use:  No  Tobacco Use:  Yes, Wants To Quit  Secondhand Smoke Exposure:  No  History of HIV:  No  History of Hepatitis:  No    PHYSICAL EXAMINATION:    General:  The patient is alert and oriented x3, in no acute distress and cooperative during the physical exam with a normal mood and affect.    Skin:  Intact over the right knee; no erythema, warmth, rashes or bruising.    Cardiovascular:  Normal capillary refill of the right lower extremity with a palpable dorsalis pedis pulse.  No evidence of lower extremity DVT.    Neurologic:  Normal sensation and motor function in the superficial peroneal, deep peroneal and tibial nerve distributions.    Musculoskeletal:  She is stable to varus and valgus stress.  Tender to palpation over the medial joint line of the right knee.  She has a negative Lachman's and a negative Greg's.  No significant effusion.    ASSESSMENT:    This is a 59-year-old female who is two weeks out from right hip loose body removal.  Her incision has healed well.    PLAN:   At this point we are going to go ahead and order an MRI on the right knee.  I am going to see her back with the results of her MRI of the right knee.    Dictated by Con Harman MD  cc:  Dr. Harman at [facility]    D:  06/17/2019  T:  06/21/2019    Typed and/or reviewed and corrected by signing  below, and sent to the Physician for final review and signature.    This report was created using voice recording software and computer-generated templates. Although every effort has been made to review for and eliminate errors, some errors may still occur.         Electronically signed by Jeremi Guardado on 06/21/2019 at 9:29 AM

## 2019-08-19 ENCOUNTER — OFFICE VISIT (OUTPATIENT)
Dept: ORTHOPEDICS | Facility: OTHER | Age: 59
End: 2019-08-19
Attending: ORTHOPAEDIC SURGERY
Payer: MEDICARE

## 2019-08-19 DIAGNOSIS — Z00.00 ROUTINE GENERAL MEDICAL EXAMINATION AT A HEALTH CARE FACILITY: Primary | ICD-10-CM

## 2019-08-19 PROCEDURE — G0463 HOSPITAL OUTPT CLINIC VISIT: HCPCS

## 2019-08-22 NOTE — PROGRESS NOTES
CHIEF COMPLAINT: Right Knee Pain Recheck    PROBLEMS:   Patient has no noted problems.    PATIENT REPORTED MEDICATIONS:  MAGNESIUM OXIDE 400 MG ORAL TABLET (MAGNESIUM OXIDE) ; Route: ORAL  DULOXETINE HCL 60 MG ORAL CAPSULE DELAYED RELEASE PARTICLES (DULOXETINE HCL) ; Route: ORAL  CALCIUM-VITAMIN D 600-400 MG-UNIT ORAL TABLET (CALCIUM CARB-CHOLECALCIFEROL) ; Route: ORAL  NALTREXONE HCL 50 MG ORAL TABLET (NALTREXONE HCL) ; Route: ORAL  BUPROPION HCL ER (XL) 300 MG ORAL TABLET EXTENDED RELEASE 24 HOUR (BUPROPION HCL) ; Route: ORAL  SYMBICORT 160-4.5 MCG/ACT INHALATION AEROSOL (BUDESONIDE-FORMOTEROL FUMARATE) ; Route: INHALATION  ALENDRONATE SODIUM 35 MG ORAL TABLET (ALENDRONATE SODIUM) ; Route: ORAL  FUROSEMIDE 20 MG ORAL TABLET (FUROSEMIDE) ; Route: ORAL  ETODOLAC 300 MG ORAL CAPSULE (ETODOLAC) ; Route: ORAL  COMBIVENT RESPIMAT  MCG/ACT INHALATION AEROSOL SOLUTION (IPRATROPIUM-ALBUTEROL) ; Route: INHALATION  NITROFURANTOIN MACROCRYSTAL 100 MG ORAL CAPSULE (NITROFURANTOIN MACROCRYSTAL) ; Route: ORAL  OMEPRAZOLE 20 MG ORAL CAPSULE DELAYED RELEASE (OMEPRAZOLE) ; Route: ORAL  ESTRACE 0.1 MG/GM VAGINAL CREAM (ESTRADIOL) ; Route: VAGINAL  SUMATRIPTAN SUCCINATE 50 MG ORAL TABLET (SUMATRIPTAN SUCCINATE) ; Route: ORAL  NICOTROL 10 MG INHALATION INHALER (NICOTINE) ; Route: INHALATION  ALBUTEROL SULFATE  (90 BASE) MCG/ACT INHALATION AEROSOL SOLUTION (ALBUTEROL SULFATE) ; Route: INHALATION  CYCLOBENZAPRINE HCL 5 MG ORAL TABLET (CYCLOBENZAPRINE HCL) ; Route: ORAL  LYRICA 150 MG ORAL CAPSULE (PREGABALIN) ; Route: ORAL  HYDROXYZINE HCL 50 MG ORAL TABLET (HYDROXYZINE HCL) ; Route: ORAL    PATIENT REPORTED ALLERGIES:  GABAPENTIN (GABAPENTIN) (SevereReaction: Angioedema and Tardive Dyskinesia)  * SULFA DRUGS (SevereReaction: Anaphylaxis)  BACLOFEN (BACLOFEN) (SevereReaction: Confusion)  * NAPROXEN (SevereReaction: Edema, Swelling)  TRAMADOL HCL (SevereReaction: Confusion)      HISTORY OF PRESENT ILLNESS:    This is a  59-year-old female.  She comes in for followup on her right knee MRI that was done on 6/19/2019, now two months ago.  She states that her right knee has been bothering her but it has gotten markedly better over the past two months since the MRI was done.      PAST MEDICAL HISTORY:    Osteoarthritis  Rheumatoid Arthritis  Degenerative Joint Disease  Fibromyalgia  Osteoporosis  Osteopenia  Chronic Pain  Alcoholism In Remission  Anemia  Asthma  Marroquin's Esophagus Without Dysplasia 06/04/2013  Colon Polyps  Compression Fracture L1 Lumbar Vertebra  COPD  Major Depression, Chronic  Diverticulosis  Eating Disorder - Bulimia  Esophageal Candidiasis 03/28/2014  Fracture of Right Shoulder  GERD  Hernia of Unspecified Site of Abdominal Cavity  Heterotopic Ossification of Bone 06/24/2013  Irritable Bowel Syndrome  Nephritis  Nicotine Addiction  Non-Union of Fracture 06/24/2013  Ovarian Cyst, Multiple  Sprain and Strain of Unspecified Site of Shoulder and Upper Arm 02/19/2014    PAST ORTHOPEDIC SURGICAL HISTORY:    Right Shoulder x2  Excision of Multiple Calcifications Left Hip Region  L4-L5 PLIF  Acromioplasty Acromionectomy Partial w/wo Coracoacromial Lig Release, Right, 07/2013    PAST SURGICAL HISTORY:    Hysterectomy  Tonsillectomy  Diagnostic Lap x4  Appendectomy  Bladder Repair (Open)  Colonoscopy  ECT of Back  Excision Subcutaneous Calcifications Right Lateral Buttock  Polypectomy  Teeth Extraction, All Teeth  Upper Endoscopy 06/11/2013 and 03/06/2014    FAMILY HISTORY:    Father:  Strokes, Heart Attacks, High Cholesterol  Mother:  Diabetes, Arthritis, Fibromyalgia, Chronic Pain, High Cholesterol, Hypertension, Heart Disease  Brother:  Heart Attack, Defibrillator, Alcoholism, Passed Away    SOCIAL HISTORY:   Occupation:  LPN, Farmer,   Marital Status:    Alcohol Use:  No  Tobacco Use:  Yes, Wants To Quit  Secondhand Smoke Exposure:  No  History of HIV:  No  History of Hepatitis:  No    PHYSICAL  EXAMINATION:    This is a 59-year-old female in no acute distress, very pleasant on examination.  Her hip has improved markedly as well since the surgery where we did a debridement of her trochanteric bursa and the skin overlying her tensor fascia.  She is actually doing very well at this time, absolutely no complaints.  Mild pain in the knee at this point.     MRI:  Review of the right knee MRI shows only some grade 2 graded mild fissuring of the articular cartilage over the lateral aspect of the medial femoral condyle.  She also has some mild thinning of the lateral cartilage in the lateral compartment.  Very mild degenerative changes within the knee overall.     ASSESSMENT:    Mild osteoarthritis right knee.    PLAN:   She is otherwise doing well.  Nothing indicated to be done at this point.  She just has some very mild osteoarthritis in the right knee.  We are going to see her back on an as-needed basis for this.     Dictated by Cno Harman MD     D:  08/19/19  T:  08/22/19     Typed and/or reviewed and corrected by signing  below, and sent to the Physician for final review and signature.     This report was created using voice recording software and computer-generated templates. Although every effort has been made to review for and eliminate errors, some errors may still occur.         Electronically signed by Leana Guardado on 08/22/2019 at 1:25 PM

## 2019-09-10 ENCOUNTER — HOSPITAL ENCOUNTER (EMERGENCY)
Facility: OTHER | Age: 59
Discharge: HOME | End: 2019-09-10
Attending: EMERGENCY MEDICINE | Admitting: EMERGENCY MEDICINE
Payer: MEDICARE

## 2019-09-10 VITALS
HEART RATE: 78 BPM | DIASTOLIC BLOOD PRESSURE: 62 MMHG | TEMPERATURE: 97.8 F | RESPIRATION RATE: 16 BRPM | OXYGEN SATURATION: 95 % | BODY MASS INDEX: 23.17 KG/M2 | WEIGHT: 135 LBS | SYSTOLIC BLOOD PRESSURE: 94 MMHG

## 2019-09-10 DIAGNOSIS — R07.9 CHEST PAIN, UNSPECIFIED TYPE: ICD-10-CM

## 2019-09-10 DIAGNOSIS — M54.16 LUMBAR RADICULOPATHY: ICD-10-CM

## 2019-09-10 LAB
ALBUMIN SERPL-MCNC: 3.8 G/DL (ref 3.5–5.7)
ALBUMIN UR-MCNC: NEGATIVE MG/DL
ALP SERPL-CCNC: 43 U/L (ref 34–104)
ALT SERPL W P-5'-P-CCNC: 17 U/L (ref 7–52)
ANION GAP SERPL CALCULATED.3IONS-SCNC: 6 MMOL/L (ref 3–14)
APPEARANCE UR: CLEAR
AST SERPL W P-5'-P-CCNC: 20 U/L (ref 13–39)
BASOPHILS # BLD AUTO: 0 10E9/L (ref 0–0.2)
BASOPHILS NFR BLD AUTO: 0.2 %
BILIRUB SERPL-MCNC: 0.3 MG/DL (ref 0.3–1)
BILIRUB UR QL STRIP: NEGATIVE
BUN SERPL-MCNC: 14 MG/DL (ref 7–25)
CALCIUM SERPL-MCNC: 8.9 MG/DL (ref 8.6–10.3)
CHLORIDE SERPL-SCNC: 105 MMOL/L (ref 98–107)
CO2 SERPL-SCNC: 29 MMOL/L (ref 21–31)
COLOR UR AUTO: YELLOW
CREAT SERPL-MCNC: 1.13 MG/DL (ref 0.6–1.2)
DIFFERENTIAL METHOD BLD: ABNORMAL
EOSINOPHIL # BLD AUTO: 0.1 10E9/L (ref 0–0.7)
EOSINOPHIL NFR BLD AUTO: 2.3 %
ERYTHROCYTE [DISTWIDTH] IN BLOOD BY AUTOMATED COUNT: 14.5 % (ref 10–15)
GFR SERPL CREATININE-BSD FRML MDRD: 49 ML/MIN/{1.73_M2}
GLUCOSE SERPL-MCNC: 114 MG/DL (ref 70–105)
GLUCOSE UR STRIP-MCNC: NEGATIVE MG/DL
HCT VFR BLD AUTO: 32.1 % (ref 35–47)
HGB BLD-MCNC: 10.7 G/DL (ref 11.7–15.7)
HGB UR QL STRIP: NEGATIVE
IMM GRANULOCYTES # BLD: 0 10E9/L (ref 0–0.4)
IMM GRANULOCYTES NFR BLD: 0.2 %
KETONES UR STRIP-MCNC: NEGATIVE MG/DL
LACTATE SERPL-SCNC: 1.2 MMOL/L (ref 0.5–2.2)
LEUKOCYTE ESTERASE UR QL STRIP: NEGATIVE
LYMPHOCYTES # BLD AUTO: 1.3 10E9/L (ref 0.8–5.3)
LYMPHOCYTES NFR BLD AUTO: 27 %
MCH RBC QN AUTO: 30.3 PG (ref 26.5–33)
MCHC RBC AUTO-ENTMCNC: 33.3 G/DL (ref 31.5–36.5)
MCV RBC AUTO: 91 FL (ref 78–100)
MONOCYTES # BLD AUTO: 0.5 10E9/L (ref 0–1.3)
MONOCYTES NFR BLD AUTO: 9.6 %
NEUTROPHILS # BLD AUTO: 2.9 10E9/L (ref 1.6–8.3)
NEUTROPHILS NFR BLD AUTO: 60.7 %
NITRATE UR QL: NEGATIVE
PH UR STRIP: 7 PH (ref 5–9)
PLATELET # BLD AUTO: 164 10E9/L (ref 150–450)
POTASSIUM SERPL-SCNC: 3.1 MMOL/L (ref 3.5–5.1)
PROT SERPL-MCNC: 6.1 G/DL (ref 6.4–8.9)
RBC # BLD AUTO: 3.53 10E12/L (ref 3.8–5.2)
SODIUM SERPL-SCNC: 140 MMOL/L (ref 134–144)
SOURCE: NORMAL
SP GR UR STRIP: 1.01 (ref 1–1.03)
TROPONIN I SERPL-MCNC: <0.03 UG/L (ref 0–0.03)
UROBILINOGEN UR STRIP-ACNC: 0.2 EU/DL (ref 0.2–1)
WBC # BLD AUTO: 4.7 10E9/L (ref 4–11)

## 2019-09-10 PROCEDURE — 80053 COMPREHEN METABOLIC PANEL: CPT | Performed by: EMERGENCY MEDICINE

## 2019-09-10 PROCEDURE — 83605 ASSAY OF LACTIC ACID: CPT | Performed by: EMERGENCY MEDICINE

## 2019-09-10 PROCEDURE — 85025 COMPLETE CBC W/AUTO DIFF WBC: CPT | Performed by: EMERGENCY MEDICINE

## 2019-09-10 PROCEDURE — 93005 ELECTROCARDIOGRAM TRACING: CPT | Performed by: EMERGENCY MEDICINE

## 2019-09-10 PROCEDURE — 99283 EMERGENCY DEPT VISIT LOW MDM: CPT | Mod: Z6 | Performed by: EMERGENCY MEDICINE

## 2019-09-10 PROCEDURE — 99284 EMERGENCY DEPT VISIT MOD MDM: CPT | Mod: 25 | Performed by: EMERGENCY MEDICINE

## 2019-09-10 PROCEDURE — 25000128 H RX IP 250 OP 636: Performed by: EMERGENCY MEDICINE

## 2019-09-10 PROCEDURE — 96374 THER/PROPH/DIAG INJ IV PUSH: CPT | Performed by: EMERGENCY MEDICINE

## 2019-09-10 PROCEDURE — 84484 ASSAY OF TROPONIN QUANT: CPT | Performed by: EMERGENCY MEDICINE

## 2019-09-10 PROCEDURE — 81003 URINALYSIS AUTO W/O SCOPE: CPT | Performed by: EMERGENCY MEDICINE

## 2019-09-10 PROCEDURE — 25800030 ZZH RX IP 258 OP 636: Performed by: EMERGENCY MEDICINE

## 2019-09-10 PROCEDURE — 36415 COLL VENOUS BLD VENIPUNCTURE: CPT | Performed by: EMERGENCY MEDICINE

## 2019-09-10 RX ORDER — SODIUM CHLORIDE 9 MG/ML
INJECTION, SOLUTION INTRAVENOUS CONTINUOUS
Status: DISCONTINUED | OUTPATIENT
Start: 2019-09-10 | End: 2019-09-10 | Stop reason: HOSPADM

## 2019-09-10 RX ORDER — KETOROLAC TROMETHAMINE 10 MG/1
10 TABLET, FILM COATED ORAL EVERY 8 HOURS PRN
Qty: 9 TABLET | Refills: 0 | Status: SHIPPED | OUTPATIENT
Start: 2019-09-10 | End: 2019-11-22

## 2019-09-10 RX ORDER — KETOROLAC TROMETHAMINE 30 MG/ML
30 INJECTION, SOLUTION INTRAMUSCULAR; INTRAVENOUS ONCE
Status: COMPLETED | OUTPATIENT
Start: 2019-09-10 | End: 2019-09-10

## 2019-09-10 RX ADMIN — SODIUM CHLORIDE: 9 INJECTION, SOLUTION INTRAVENOUS at 15:59

## 2019-09-10 RX ADMIN — KETOROLAC TROMETHAMINE 30 MG: 30 INJECTION, SOLUTION INTRAMUSCULAR at 15:59

## 2019-09-10 NOTE — ED AVS SNAPSHOT
[facility]  [address]  [place] [address]  Phone:  [phone]  Fax:  [phone]                                    Deidre Braga   MRN: [id number]    Department:  [facility]   Date of Visit:  9/10/2019           After Visit Summary Signature Page    I have received my discharge instructions, and my questions have been answered. I have discussed any challenges I see with this plan with the nurse or doctor.    ..........................................................................................................................................  Patient/Patient Representative Signature      ..........................................................................................................................................  Patient Representative Print Name and Relationship to Patient    ..................................................               ................................................  Date                                   Time    ..........................................................................................................................................  Reviewed by Signature/Title    ...................................................              ..............................................  Date                                               Time          22EPIC Rev 08/18
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
## 2019-09-10 NOTE — ED TRIAGE NOTES
"Patient reporting sporadic chest pains that have been chronic for \"past several years,\" and chronic leg pain. Patient is part of pain clinic, and \"cannot wait until Friday.\" Patient reporting pain in legs is unbearable. Patient cannot sit still, fidgeting in triage chair. Patient also stating \"I think I have a UTI, I've been in a fog and confused. I don't remember what I say to certain people.\" Patient very anxious, talking very fast.   "

## 2019-11-22 ENCOUNTER — HOSPITAL ENCOUNTER (EMERGENCY)
Facility: OTHER | Age: 59
Discharge: HOME | End: 2019-11-22
Attending: EMERGENCY MEDICINE | Admitting: EMERGENCY MEDICINE
Payer: MEDICARE

## 2019-11-22 ENCOUNTER — APPOINTMENT (OUTPATIENT)
Dept: GENERAL RADIOLOGY | Facility: OTHER | Age: 59
End: 2019-11-22
Attending: EMERGENCY MEDICINE
Payer: MEDICARE

## 2019-11-22 VITALS
HEART RATE: 101 BPM | HEIGHT: 64 IN | WEIGHT: 135 LBS | RESPIRATION RATE: 10 BRPM | BODY MASS INDEX: 23.05 KG/M2 | SYSTOLIC BLOOD PRESSURE: 132 MMHG | OXYGEN SATURATION: 98 % | DIASTOLIC BLOOD PRESSURE: 66 MMHG | TEMPERATURE: 96.1 F

## 2019-11-22 DIAGNOSIS — M25.552 HIP PAIN, LEFT: ICD-10-CM

## 2019-11-22 PROCEDURE — 99284 EMERGENCY DEPT VISIT MOD MDM: CPT | Performed by: EMERGENCY MEDICINE

## 2019-11-22 PROCEDURE — 99283 EMERGENCY DEPT VISIT LOW MDM: CPT | Mod: Z6 | Performed by: EMERGENCY MEDICINE

## 2019-11-22 PROCEDURE — 25000132 ZZH RX MED GY IP 250 OP 250 PS 637: Mod: GY | Performed by: EMERGENCY MEDICINE

## 2019-11-22 PROCEDURE — 96372 THER/PROPH/DIAG INJ SC/IM: CPT | Performed by: EMERGENCY MEDICINE

## 2019-11-22 PROCEDURE — 73502 X-RAY EXAM HIP UNI 2-3 VIEWS: CPT

## 2019-11-22 PROCEDURE — 25000128 H RX IP 250 OP 636: Performed by: EMERGENCY MEDICINE

## 2019-11-22 RX ORDER — CYCLOBENZAPRINE HCL 10 MG
10 TABLET ORAL ONCE
Status: COMPLETED | OUTPATIENT
Start: 2019-11-22 | End: 2019-11-22

## 2019-11-22 RX ORDER — OXYCODONE AND ACETAMINOPHEN 5; 325 MG/1; MG/1
1 TABLET ORAL ONCE
Status: COMPLETED | OUTPATIENT
Start: 2019-11-22 | End: 2019-11-22

## 2019-11-22 RX ORDER — KETOROLAC TROMETHAMINE 30 MG/ML
30 INJECTION, SOLUTION INTRAMUSCULAR; INTRAVENOUS ONCE
Status: COMPLETED | OUTPATIENT
Start: 2019-11-22 | End: 2019-11-22

## 2019-11-22 RX ADMIN — KETOROLAC TROMETHAMINE 30 MG: 30 INJECTION, SOLUTION INTRAMUSCULAR at 18:16

## 2019-11-22 RX ADMIN — CYCLOBENZAPRINE HYDROCHLORIDE 10 MG: 10 TABLET, FILM COATED ORAL at 18:16

## 2019-11-22 RX ADMIN — OXYCODONE AND ACETAMINOPHEN 1 TABLET: 5; 325 TABLET ORAL at 19:10

## 2019-11-22 ASSESSMENT — MIFFLIN-ST. JEOR: SCORE: 1167.36

## 2019-11-22 NOTE — ED TRIAGE NOTES
"ED Nursing Triage Note (General)   ________________________________    Deidre Braga is a 59 year old Female that presents to triage private car  With history of  Has chronic leg pain with a pain management MD in [place], MD in  is Dr. Do, has been having increased pain in legs bilat.  Has had injections in back that has not been working, reported by patient   Significant symptoms had onset ongoing problem   /66   Pulse 101   Temp 96.1  F (35.6  C) (Tympanic)   Resp 10   Ht 1.626 m (5' 4\")   Wt 61.2 kg (135 lb)   SpO2 98%   Breastfeeding No   BMI 23.17 kg/m  t  Patient appears alert , in moderate distress., and cooperative behavior.  Anxiety: has notified MD in [place] and was told he could not give her anything  GCS 15  Airway: intact  Breathing noted as Normal.  Circulation Normal  Skin normal  Action taken:  Triage to critical care immediately      PRE HOSPITAL PRIOR LIVING SITUATION Alone  "

## 2019-11-22 NOTE — ED AVS SNAPSHOT
[facility]  [address]  [place] [address]  Phone:  [phone]  Fax:  [phone]                                    Deidre Braga   MRN: [id number]    Department:  [facility]   Date of Visit:  11/22/2019           After Visit Summary Signature Page    I have received my discharge instructions, and my questions have been answered. I have discussed any challenges I see with this plan with the nurse or doctor.    ..........................................................................................................................................  Patient/Patient Representative Signature      ..........................................................................................................................................  Patient Representative Print Name and Relationship to Patient    ..................................................               ................................................  Date                                   Time    ..........................................................................................................................................  Reviewed by Signature/Title    ...................................................              ..............................................  Date                                               Time          22EPIC Rev 08/18
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
## 2019-12-02 ENCOUNTER — OFFICE VISIT (OUTPATIENT)
Dept: ORTHOPEDICS | Facility: OTHER | Age: 59
End: 2019-12-02
Attending: ORTHOPAEDIC SURGERY
Payer: MEDICARE

## 2019-12-02 ENCOUNTER — HOSPITAL ENCOUNTER (OUTPATIENT)
Dept: GENERAL RADIOLOGY | Facility: OTHER | Age: 59
Discharge: HOME | End: 2019-12-02
Attending: ORTHOPAEDIC SURGERY | Admitting: ORTHOPAEDIC SURGERY
Payer: MEDICARE

## 2019-12-02 DIAGNOSIS — M54.9 BACK PAIN, UNSPECIFIED BACK LOCATION, UNSPECIFIED BACK PAIN LATERALITY, UNSPECIFIED CHRONICITY: ICD-10-CM

## 2019-12-02 DIAGNOSIS — M25.551 BILATERAL HIP PAIN: Primary | ICD-10-CM

## 2019-12-02 DIAGNOSIS — M25.552 BILATERAL HIP PAIN: Primary | ICD-10-CM

## 2019-12-02 PROCEDURE — 72100 X-RAY EXAM L-S SPINE 2/3 VWS: CPT

## 2019-12-02 PROCEDURE — G0463 HOSPITAL OUTPT CLINIC VISIT: HCPCS | Mod: 25

## 2019-12-02 PROCEDURE — G0463 HOSPITAL OUTPT CLINIC VISIT: HCPCS

## 2019-12-05 ENCOUNTER — HOSPITAL ENCOUNTER (OUTPATIENT)
Dept: MRI IMAGING | Facility: OTHER | Age: 59
Discharge: HOME | End: 2019-12-05
Attending: ORTHOPAEDIC SURGERY | Admitting: FAMILY MEDICINE
Payer: MEDICARE

## 2019-12-05 DIAGNOSIS — M25.551 BILATERAL HIP PAIN: ICD-10-CM

## 2019-12-05 DIAGNOSIS — M25.552 BILATERAL HIP PAIN: ICD-10-CM

## 2019-12-05 PROCEDURE — 72148 MRI LUMBAR SPINE W/O DYE: CPT

## 2019-12-06 NOTE — PROGRESS NOTES
CHIEF COMPLAINT: Loose Body Removal of Right Hip Recheck    PROBLEMS:   Patient has no noted problems.    PATIENT REPORTED MEDICATIONS:  MAGNESIUM OXIDE 400 MG ORAL TABLET (MAGNESIUM OXIDE) ; Route: ORAL  DULOXETINE HCL 60 MG ORAL CAPSULE DELAYED RELEASE PARTICLES (DULOXETINE HCL) ; Route: ORAL  CALCIUM-VITAMIN D 600-400 MG-UNIT ORAL TABLET (CALCIUM CARB-CHOLECALCIFEROL) ; Route: ORAL  NALTREXONE HCL 50 MG ORAL TABLET (NALTREXONE HCL) ; Route: ORAL  BUPROPION HCL ER (XL) 300 MG ORAL TABLET EXTENDED RELEASE 24 HOUR (BUPROPION HCL) ; Route: ORAL  SYMBICORT 160-4.5 MCG/ACT INHALATION AEROSOL (BUDESONIDE-FORMOTEROL FUMARATE) ; Route: INHALATION  ALENDRONATE SODIUM 35 MG ORAL TABLET (ALENDRONATE SODIUM) ; Route: ORAL  FUROSEMIDE 20 MG ORAL TABLET (FUROSEMIDE) ; Route: ORAL  ETODOLAC 300 MG ORAL CAPSULE (ETODOLAC) ; Route: ORAL  COMBIVENT RESPIMAT  MCG/ACT INHALATION AEROSOL SOLUTION (IPRATROPIUM-ALBUTEROL) ; Route: INHALATION  NITROFURANTOIN MACROCRYSTAL 100 MG ORAL CAPSULE (NITROFURANTOIN MACROCRYSTAL) ; Route: ORAL  OMEPRAZOLE 20 MG ORAL CAPSULE DELAYED RELEASE (OMEPRAZOLE) ; Route: ORAL  ESTRACE 0.1 MG/GM VAGINAL CREAM (ESTRADIOL) ; Route: VAGINAL  SUMATRIPTAN SUCCINATE 50 MG ORAL TABLET (SUMATRIPTAN SUCCINATE) ; Route: ORAL  NICOTROL 10 MG INHALATION INHALER (NICOTINE) ; Route: INHALATION  ALBUTEROL SULFATE  (90 BASE) MCG/ACT INHALATION AEROSOL SOLUTION (ALBUTEROL SULFATE) ; Route: INHALATION  CYCLOBENZAPRINE HCL 5 MG ORAL TABLET (CYCLOBENZAPRINE HCL) ; Route: ORAL  LYRICA 150 MG ORAL CAPSULE (PREGABALIN) ; Route: ORAL  HYDROXYZINE HCL 50 MG ORAL TABLET (HYDROXYZINE HCL) ; Route: ORAL    PATIENT REPORTED ALLERGIES:  GABAPENTIN (GABAPENTIN) (SevereReaction: Angioedema and Tardive Dyskinesia)  * SULFA DRUGS (SevereReaction: Anaphylaxis)  BACLOFEN (BACLOFEN) (SevereReaction: Confusion)  * NAPROXEN (SevereReaction: Edema, Swelling)  TRAMADOL HCL (SevereReaction: Confusion)      HISTORY OF PRESENT ILLNESS:     She is six months status post loose body removal of the right hip.  She is having a little bit of pain in the left hip at this point.  Her right hip does not bother her.  Most of her complaints come from bilateral buttock pain that she attributes to her hip joints.    PAST MEDICAL HISTORY:    Osteoarthritis  Rheumatoid Arthritis  Degenerative Joint Disease  Fibromyalgia  Osteoporosis  Osteopenia  Chronic Pain  Alcoholism In Remission  Anemia  Asthma  Marroquin's Esophagus Without Dysplasia 06/04/2013  Colon Polyps  Compression Fracture L1 Lumbar Vertebra  COPD  Major Depression, Chronic  Diverticulosis  Eating Disorder - Bulimia  Esophageal Candidiasis 03/28/2014  Fracture of Right Shoulder  GERD  Hernia of Unspecified Site of Abdominal Cavity  Heterotopic Ossification of Bone 06/24/2013  Irritable Bowel Syndrome  Nephritis  Nicotine Addiction  Non-Union of Fracture 06/24/2013  Ovarian Cyst, Multiple  Sprain and Strain of Unspecified Site of Shoulder and Upper Arm 02/19/2014    PAST ORTHOPEDIC SURGICAL HISTORY:    Right Shoulder x2  Excision of Multiple Calcifications Left Hip Region  L4-L5 PLIF  Acromioplasty Acromionectomy Partial w/wo Coracoacromial Lig Release, Right, 07/2013    PAST SURGICAL HISTORY:    Hysterectomy  Tonsillectomy  Diagnostic Lap x4  Appendectomy  Bladder Repair (Open)  Colonoscopy  ECT of Back  Excision Subcutaneous Calcifications Right Lateral Buttock  Polypectomy  Teeth Extraction, All Teeth  Upper Endoscopy 06/11/2013 and 03/06/2014    FAMILY HISTORY:    Father:  Strokes, Heart Attacks, High Cholesterol  Mother:  Diabetes, Arthritis, Fibromyalgia, Chronic Pain, High Cholesterol, Hypertension, Heart Disease  Brother:  Heart Attack, Defibrillator, Alcoholism, Passed Away    SOCIAL HISTORY:   Occupation:  LPN, Farmer,   Marital Status:    Alcohol Use:  No  Tobacco Use:  Yes, Wants To Quit  Secondhand Smoke Exposure:  No  History of HIV:  No  History of Hepatitis:   No    PHYSICAL EXAMINATION:    On examination she has no pain with either leg, with internal and external rotation in a seated position.  She has full flexion and full extension of the hips.  She is really nontender to palpation over the lateral sides.  When asked to locate her pain she points directly at her gluteus bilaterally.    X-RAY:  X-ray examination of the left hip shows some loose bodies within the greater trochanteric bursa versus subcutaneous.    She has inborn instrumentation at L4-5 with a intervertebral spacer as well.  She has multiple compression fractures throughout her lumbar spine.    X-ray examination of the bilateral hips shows a fairly normal joint space.  No obvious osteoarthritis is appreciated.  There is a scoliotic curve of the lumbar spine to the left.    ASSESSMENT:    This is a 59-year-old female who I suspect has significant lumbar pathology at this point given the multiple compression fractures and the history of previous L4-5 discectomy and fusion.    PLAN:   We are going to obtain an MRI of her lumbar spine to evaluate for nerve root compression.  I will call her with the results.    Dictated by Con Harman MD  cc:  Dr. Harman at [facility]    D:  12/02/2019  T:  12/06/2019    Typed and/or reviewed and corrected by signing  below, and sent to the Physician for final review and signature.    This report was created using voice recording software and computer-generated templates. Although every effort has been made to review for and eliminate errors, some errors may still occur.

## 2019-12-17 ENCOUNTER — HOSPITAL ENCOUNTER (INPATIENT)
Facility: OTHER | Age: 59
LOS: 7 days | Discharge: NURSING, REHAB OR LONG-TERM CARE | DRG: 917 | End: 2019-12-24
Attending: FAMILY MEDICINE | Admitting: FAMILY MEDICINE
Payer: MEDICARE

## 2019-12-17 ENCOUNTER — APPOINTMENT (OUTPATIENT)
Dept: GENERAL RADIOLOGY | Facility: OTHER | Age: 59
DRG: 917 | End: 2019-12-17
Attending: FAMILY MEDICINE
Payer: MEDICARE

## 2019-12-17 ENCOUNTER — APPOINTMENT (OUTPATIENT)
Dept: CT IMAGING | Facility: OTHER | Age: 59
DRG: 917 | End: 2019-12-17
Attending: FAMILY MEDICINE
Payer: MEDICARE

## 2019-12-17 DIAGNOSIS — S81.011A KNEE LACERATION, RIGHT, INITIAL ENCOUNTER: ICD-10-CM
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
## 2019-12-17 DIAGNOSIS — J44.89 OBSTRUCTIVE CHRONIC BRONCHITIS WITHOUT EXACERBATION (H): ICD-10-CM
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
## 2019-12-17 DIAGNOSIS — F17.210 NICOTINE DEPENDENCE, CIGARETTES, UNCOMPLICATED: ICD-10-CM

## 2019-12-17 DIAGNOSIS — R94.31 NONSPECIFIC ABNORMAL ELECTROCARDIOGRAM (ECG) (EKG): ICD-10-CM

## 2019-12-17 DIAGNOSIS — Z79.51 LONG TERM CURRENT USE OF INHALED STEROID: ICD-10-CM

## 2019-12-17 DIAGNOSIS — G89.29 CHRONIC LOW BACK PAIN, UNSPECIFIED BACK PAIN LATERALITY, UNSPECIFIED WHETHER SCIATICA PRESENT: ICD-10-CM

## 2019-12-17 DIAGNOSIS — S81.011A LACERATION OF RIGHT KNEE, INITIAL ENCOUNTER: ICD-10-CM

## 2019-12-17 DIAGNOSIS — Z79.899 ENCOUNTER FOR LONG-TERM (CURRENT) USE OF OTHER MEDICATIONS: ICD-10-CM

## 2019-12-17 DIAGNOSIS — M54.50 CHRONIC LOW BACK PAIN, UNSPECIFIED BACK PAIN LATERALITY, UNSPECIFIED WHETHER SCIATICA PRESENT: ICD-10-CM

## 2019-12-17 DIAGNOSIS — L03.115 CELLULITIS OF RIGHT KNEE: ICD-10-CM

## 2019-12-17 DIAGNOSIS — J44.9 CHRONIC OBSTRUCTIVE PULMONARY DISEASE, UNSPECIFIED COPD TYPE (H): ICD-10-CM

## 2019-12-17 DIAGNOSIS — R94.31 PROLONGED Q-T INTERVAL ON ECG: ICD-10-CM

## 2019-12-17 DIAGNOSIS — R41.0 ACUTE DELIRIUM: ICD-10-CM

## 2019-12-17 DIAGNOSIS — R41.0 DELIRIUM: Primary | ICD-10-CM

## 2019-12-17 PROBLEM — F19.20 CHEMICAL DEPENDENCY (H): Status: ACTIVE | Noted: 2019-12-17

## 2019-12-17 PROBLEM — K52.9 CHRONIC DIARRHEA: Status: ACTIVE | Noted: 2018-02-08

## 2019-12-17 PROBLEM — G92.8 TOXIC METABOLIC ENCEPHALOPATHY: Status: ACTIVE | Noted: 2019-11-07

## 2019-12-17 PROBLEM — T50.901A MEDICATION OVERDOSE: Status: ACTIVE | Noted: 2019-12-17

## 2019-12-17 PROBLEM — M79.7 FIBROMYALGIA: Status: ACTIVE | Noted: 2018-02-14

## 2019-12-17 LAB
ALBUMIN SERPL-MCNC: 4.2 G/DL (ref 3.5–5.7)
ALBUMIN UR-MCNC: NEGATIVE MG/DL
ALP SERPL-CCNC: 61 U/L (ref 34–104)
ALT SERPL W P-5'-P-CCNC: 42 U/L (ref 7–52)
AMMONIA PLAS-SCNC: 31 UMOL/L (ref 16–53)
AMPHETAMINES UR QL SCN: NOT DETECTED
ANION GAP SERPL CALCULATED.3IONS-SCNC: 10 MMOL/L (ref 3–14)
APAP SERPL-MCNC: <0.2 UG/ML (ref 0–30)
APPEARANCE UR: CLEAR
AST SERPL W P-5'-P-CCNC: 44 U/L (ref 13–39)
BARBITURATES UR QL: NOT DETECTED
BASOPHILS # BLD AUTO: 0 10E9/L (ref 0–0.2)
BASOPHILS NFR BLD AUTO: 0.2 %
BENZODIAZ UR QL: NOT DETECTED
BILIRUB SERPL-MCNC: 0.4 MG/DL (ref 0.3–1)
BILIRUB UR QL STRIP: ABNORMAL
BUN SERPL-MCNC: 19 MG/DL (ref 7–25)
BUPRENORPHINE UR QL: NOT DETECTED NG/ML
CALCIUM SERPL-MCNC: 9.1 MG/DL (ref 8.6–10.3)
CANNABINOIDS UR QL: NOT DETECTED NG/ML
CHLORIDE SERPL-SCNC: 98 MMOL/L (ref 98–107)
CO2 SERPL-SCNC: 31 MMOL/L (ref 21–31)
COCAINE UR QL: NOT DETECTED
COLOR UR AUTO: YELLOW
CREAT SERPL-MCNC: 1.12 MG/DL (ref 0.6–1.2)
D-METHAMPHET UR QL: NOT DETECTED NG/ML
DIFFERENTIAL METHOD BLD: ABNORMAL
EOSINOPHIL # BLD AUTO: 0.1 10E9/L (ref 0–0.7)
EOSINOPHIL NFR BLD AUTO: 1.2 %
ERYTHROCYTE [DISTWIDTH] IN BLOOD BY AUTOMATED COUNT: 14.6 % (ref 10–15)
ETHANOL SERPL-MCNC: <0.01 %
GFR SERPL CREATININE-BSD FRML MDRD: 50 ML/MIN/{1.73_M2}
GLUCOSE SERPL-MCNC: 106 MG/DL (ref 70–105)
GLUCOSE UR STRIP-MCNC: NEGATIVE MG/DL
HCT VFR BLD AUTO: 33.9 % (ref 35–47)
HGB BLD-MCNC: 11 G/DL (ref 11.7–15.7)
HGB UR QL STRIP: NEGATIVE
IMM GRANULOCYTES # BLD: 0.1 10E9/L (ref 0–0.4)
IMM GRANULOCYTES NFR BLD: 1 %
INR PPP: 0.97 (ref 0–1.3)
KETONES UR STRIP-MCNC: NEGATIVE MG/DL
LACTATE SERPL-SCNC: 1 MMOL/L (ref 0.5–2.2)
LEUKOCYTE ESTERASE UR QL STRIP: NEGATIVE
LIPASE SERPL-CCNC: 9 U/L (ref 11–82)
LYMPHOCYTES # BLD AUTO: 1 10E9/L (ref 0.8–5.3)
LYMPHOCYTES NFR BLD AUTO: 20.2 %
MCH RBC QN AUTO: 29.6 PG (ref 26.5–33)
MCHC RBC AUTO-ENTMCNC: 32.4 G/DL (ref 31.5–36.5)
MCV RBC AUTO: 91 FL (ref 78–100)
METHADONE UR QL SCN: NOT DETECTED
MONOCYTES # BLD AUTO: 0.6 10E9/L (ref 0–1.3)
MONOCYTES NFR BLD AUTO: 12.7 %
NEUTROPHILS # BLD AUTO: 3.2 10E9/L (ref 1.6–8.3)
NEUTROPHILS NFR BLD AUTO: 64.7 %
NITRATE UR QL: NEGATIVE
OPIATES UR QL SCN: NOT DETECTED
OXYCODONE UR QL: NOT DETECTED NG/ML
PCP UR QL SCN: NOT DETECTED
PH UR STRIP: 7.5 PH (ref 5–9)
PLATELET # BLD AUTO: 183 10E9/L (ref 150–450)
POTASSIUM SERPL-SCNC: 3.8 MMOL/L (ref 3.5–5.1)
PROPOXYPH UR QL: NOT DETECTED NG/ML
PROT SERPL-MCNC: 6.4 G/DL (ref 6.4–8.9)
RBC # BLD AUTO: 3.72 10E12/L (ref 3.8–5.2)
SALICYLATES SERPL-MCNC: <0 MG/DL (ref 15–30)
SODIUM SERPL-SCNC: 139 MMOL/L (ref 134–144)
SOURCE: ABNORMAL
SP GR UR STRIP: 1.01 (ref 1–1.03)
TRICYCLICS UR QL SCN: ABNORMAL NG/ML
TROPONIN I SERPL-MCNC: 5.3 PG/ML
UROBILINOGEN UR STRIP-ACNC: 0.2 EU/DL (ref 0.2–1)
WBC # BLD AUTO: 4.9 10E9/L (ref 4–11)

## 2019-12-17 PROCEDURE — 12002 RPR S/N/AX/GEN/TRNK2.6-7.5CM: CPT | Performed by: FAMILY MEDICINE

## 2019-12-17 PROCEDURE — 0HQKXZZ REPAIR RIGHT LOWER LEG SKIN, EXTERNAL APPROACH: ICD-10-PCS | Performed by: FAMILY MEDICINE

## 2019-12-17 PROCEDURE — 85610 PROTHROMBIN TIME: CPT | Performed by: FAMILY MEDICINE

## 2019-12-17 PROCEDURE — 80320 DRUG SCREEN QUANTALCOHOLS: CPT | Performed by: FAMILY MEDICINE

## 2019-12-17 PROCEDURE — 99285 EMERGENCY DEPT VISIT HI MDM: CPT | Mod: 25 | Performed by: FAMILY MEDICINE

## 2019-12-17 PROCEDURE — 96361 HYDRATE IV INFUSION ADD-ON: CPT | Performed by: FAMILY MEDICINE

## 2019-12-17 PROCEDURE — 94640 AIRWAY INHALATION TREATMENT: CPT | Mod: 76

## 2019-12-17 PROCEDURE — 36415 COLL VENOUS BLD VENIPUNCTURE: CPT | Performed by: FAMILY MEDICINE

## 2019-12-17 PROCEDURE — 80307 DRUG TEST PRSMV CHEM ANLYZR: CPT | Performed by: FAMILY MEDICINE

## 2019-12-17 PROCEDURE — 85025 COMPLETE CBC W/AUTO DIFF WBC: CPT | Performed by: FAMILY MEDICINE

## 2019-12-17 PROCEDURE — 93005 ELECTROCARDIOGRAM TRACING: CPT | Performed by: FAMILY MEDICINE

## 2019-12-17 PROCEDURE — 99285 EMERGENCY DEPT VISIT HI MDM: CPT | Mod: Z6 | Performed by: FAMILY MEDICINE

## 2019-12-17 PROCEDURE — 25800030 ZZH RX IP 258 OP 636: Performed by: FAMILY MEDICINE

## 2019-12-17 PROCEDURE — 12002 RPR S/N/AX/GEN/TRNK2.6-7.5CM: CPT | Mod: Z6 | Performed by: FAMILY MEDICINE

## 2019-12-17 PROCEDURE — 25000125 ZZHC RX 250: Performed by: FAMILY MEDICINE

## 2019-12-17 PROCEDURE — 96360 HYDRATION IV INFUSION INIT: CPT | Performed by: FAMILY MEDICINE

## 2019-12-17 PROCEDURE — 80053 COMPREHEN METABOLIC PANEL: CPT | Performed by: FAMILY MEDICINE

## 2019-12-17 PROCEDURE — 71045 X-RAY EXAM CHEST 1 VIEW: CPT

## 2019-12-17 PROCEDURE — 84484 ASSAY OF TROPONIN QUANT: CPT | Performed by: FAMILY MEDICINE

## 2019-12-17 PROCEDURE — 40000275 ZZH STATISTIC RCP TIME EA 10 MIN

## 2019-12-17 PROCEDURE — 80329 ANALGESICS NON-OPIOID 1 OR 2: CPT | Performed by: FAMILY MEDICINE

## 2019-12-17 PROCEDURE — 82140 ASSAY OF AMMONIA: CPT | Performed by: FAMILY MEDICINE

## 2019-12-17 PROCEDURE — 25000132 ZZH RX MED GY IP 250 OP 250 PS 637: Mod: GY | Performed by: FAMILY MEDICINE

## 2019-12-17 PROCEDURE — 83690 ASSAY OF LIPASE: CPT | Performed by: FAMILY MEDICINE

## 2019-12-17 PROCEDURE — 80366 DRUG SCREENING PREGABALIN: CPT | Performed by: FAMILY MEDICINE

## 2019-12-17 PROCEDURE — 70450 CT HEAD/BRAIN W/O DYE: CPT

## 2019-12-17 PROCEDURE — 99223 1ST HOSP IP/OBS HIGH 75: CPT | Mod: AI | Performed by: FAMILY MEDICINE

## 2019-12-17 PROCEDURE — 12000000 ZZH R&B MED SURG/OB

## 2019-12-17 PROCEDURE — 83605 ASSAY OF LACTIC ACID: CPT | Performed by: FAMILY MEDICINE

## 2019-12-17 PROCEDURE — 81003 URINALYSIS AUTO W/O SCOPE: CPT | Performed by: FAMILY MEDICINE

## 2019-12-17 PROCEDURE — 94640 AIRWAY INHALATION TREATMENT: CPT

## 2019-12-17 PROCEDURE — 93010 ELECTROCARDIOGRAM REPORT: CPT | Performed by: INTERNAL MEDICINE

## 2019-12-17 RX ORDER — LIDOCAINE HYDROCHLORIDE AND EPINEPHRINE 10; 10 MG/ML; UG/ML
1 INJECTION, SOLUTION INFILTRATION; PERINEURAL ONCE
Status: DISCONTINUED | OUTPATIENT
Start: 2019-12-17 | End: 2019-12-23

## 2019-12-17 RX ORDER — BISACODYL 5 MG
15 TABLET, DELAYED RELEASE (ENTERIC COATED) ORAL DAILY PRN
Status: DISCONTINUED | OUTPATIENT
Start: 2019-12-17 | End: 2019-12-24 | Stop reason: HOSPADM

## 2019-12-17 RX ORDER — HYDROXYZINE HYDROCHLORIDE 50 MG/1
50 TABLET, FILM COATED ORAL 3 TIMES DAILY PRN
Status: ON HOLD | COMMUNITY
End: 2019-12-22

## 2019-12-17 RX ORDER — BISACODYL 5 MG
5 TABLET, DELAYED RELEASE (ENTERIC COATED) ORAL DAILY PRN
Status: DISCONTINUED | OUTPATIENT
Start: 2019-12-17 | End: 2019-12-24 | Stop reason: HOSPADM

## 2019-12-17 RX ORDER — BUDESONIDE AND FORMOTEROL FUMARATE DIHYDRATE 160; 4.5 UG/1; UG/1
2 AEROSOL RESPIRATORY (INHALATION) 2 TIMES DAILY
COMMUNITY

## 2019-12-17 RX ORDER — FLUTICASONE PROPIONATE 110 UG/1
1 AEROSOL, METERED RESPIRATORY (INHALATION) EVERY 12 HOURS
Status: DISCONTINUED | OUTPATIENT
Start: 2019-12-17 | End: 2019-12-20

## 2019-12-17 RX ORDER — ALENDRONATE SODIUM 35 MG/1
35 TABLET ORAL
COMMUNITY

## 2019-12-17 RX ORDER — ALBUTEROL SULFATE 0.83 MG/ML
2.5 SOLUTION RESPIRATORY (INHALATION) EVERY 4 HOURS PRN
Status: DISCONTINUED | OUTPATIENT
Start: 2019-12-17 | End: 2019-12-24 | Stop reason: HOSPADM

## 2019-12-17 RX ORDER — LIDOCAINE 40 MG/G
CREAM TOPICAL
Status: DISCONTINUED | OUTPATIENT
Start: 2019-12-17 | End: 2019-12-24 | Stop reason: HOSPADM

## 2019-12-17 RX ORDER — ACETAMINOPHEN 650 MG
TABLET, EXTENDED RELEASE ORAL ONCE
Status: COMPLETED | OUTPATIENT
Start: 2019-12-17 | End: 2019-12-17

## 2019-12-17 RX ORDER — SODIUM CHLORIDE 9 MG/ML
INJECTION, SOLUTION INTRAVENOUS CONTINUOUS
Status: DISCONTINUED | OUTPATIENT
Start: 2019-12-17 | End: 2019-12-20

## 2019-12-17 RX ORDER — NALOXONE HYDROCHLORIDE 0.4 MG/ML
.1-.4 INJECTION, SOLUTION INTRAMUSCULAR; INTRAVENOUS; SUBCUTANEOUS
Status: DISCONTINUED | OUTPATIENT
Start: 2019-12-17 | End: 2019-12-24 | Stop reason: HOSPADM

## 2019-12-17 RX ORDER — ESTRADIOL 0.1 MG/G
1 CREAM VAGINAL
COMMUNITY

## 2019-12-17 RX ORDER — ALBUTEROL SULFATE 0.83 MG/ML
2.5 SOLUTION RESPIRATORY (INHALATION) ONCE
Status: COMPLETED | OUTPATIENT
Start: 2019-12-17 | End: 2019-12-17

## 2019-12-17 RX ORDER — FAMOTIDINE 20 MG/1
20 TABLET, FILM COATED ORAL 2 TIMES DAILY
Status: DISCONTINUED | OUTPATIENT
Start: 2019-12-17 | End: 2019-12-20

## 2019-12-17 RX ORDER — SODIUM CHLORIDE 9 MG/ML
1000 INJECTION, SOLUTION INTRAVENOUS CONTINUOUS
Status: DISCONTINUED | OUTPATIENT
Start: 2019-12-17 | End: 2019-12-18 | Stop reason: DRUGHIGH

## 2019-12-17 RX ORDER — BISACODYL 5 MG
10 TABLET, DELAYED RELEASE (ENTERIC COATED) ORAL DAILY PRN
Status: DISCONTINUED | OUTPATIENT
Start: 2019-12-17 | End: 2019-12-24 | Stop reason: HOSPADM

## 2019-12-17 RX ORDER — POLYETHYLENE GLYCOL 3350 17 G/17G
17 POWDER, FOR SOLUTION ORAL DAILY PRN
Status: DISCONTINUED | OUTPATIENT
Start: 2019-12-17 | End: 2019-12-24 | Stop reason: HOSPADM

## 2019-12-17 RX ORDER — ETODOLAC 300 MG
300 CAPSULE ORAL 2 TIMES DAILY PRN
COMMUNITY

## 2019-12-17 RX ORDER — POTASSIUM CHLORIDE 1500 MG/1
20-40 TABLET, EXTENDED RELEASE ORAL
Status: DISCONTINUED | OUTPATIENT
Start: 2019-12-17 | End: 2019-12-24 | Stop reason: HOSPADM

## 2019-12-17 RX ORDER — ALBUTEROL SULFATE 90 UG/1
1-2 AEROSOL, METERED RESPIRATORY (INHALATION) EVERY 4 HOURS PRN
Status: DISCONTINUED | OUTPATIENT
Start: 2019-12-17 | End: 2019-12-17 | Stop reason: CLARIF

## 2019-12-17 RX ORDER — PROCHLORPERAZINE MALEATE 10 MG
10 TABLET ORAL EVERY 6 HOURS PRN
Status: DISCONTINUED | OUTPATIENT
Start: 2019-12-17 | End: 2019-12-24 | Stop reason: HOSPADM

## 2019-12-17 RX ORDER — PREGABALIN 150 MG/1
150 CAPSULE ORAL 3 TIMES DAILY
Status: ON HOLD | COMMUNITY
End: 2019-12-23

## 2019-12-17 RX ORDER — LIDOCAINE HYDROCHLORIDE AND EPINEPHRINE 10; 10 MG/ML; UG/ML
1 INJECTION, SOLUTION INFILTRATION; PERINEURAL ONCE
Status: COMPLETED | OUTPATIENT
Start: 2019-12-17 | End: 2019-12-17

## 2019-12-17 RX ORDER — NALTREXONE HYDROCHLORIDE 50 MG/1
50 TABLET, FILM COATED ORAL DAILY
COMMUNITY

## 2019-12-17 RX ORDER — MAGNESIUM SULFATE HEPTAHYDRATE 40 MG/ML
4 INJECTION, SOLUTION INTRAVENOUS EVERY 4 HOURS PRN
Status: DISCONTINUED | OUTPATIENT
Start: 2019-12-17 | End: 2019-12-24 | Stop reason: HOSPADM

## 2019-12-17 RX ORDER — POTASSIUM CHLORIDE 7.45 MG/ML
10 INJECTION INTRAVENOUS
Status: DISCONTINUED | OUTPATIENT
Start: 2019-12-17 | End: 2019-12-24 | Stop reason: HOSPADM

## 2019-12-17 RX ORDER — ACETAMINOPHEN 325 MG/1
650 TABLET ORAL EVERY 4 HOURS PRN
Status: DISCONTINUED | OUTPATIENT
Start: 2019-12-17 | End: 2019-12-24 | Stop reason: HOSPADM

## 2019-12-17 RX ORDER — PROCHLORPERAZINE 25 MG
25 SUPPOSITORY, RECTAL RECTAL EVERY 12 HOURS PRN
Status: DISCONTINUED | OUTPATIENT
Start: 2019-12-17 | End: 2019-12-24 | Stop reason: HOSPADM

## 2019-12-17 RX ORDER — CYCLOBENZAPRINE HCL 5 MG
10 TABLET ORAL
Status: ON HOLD | COMMUNITY
End: 2019-12-23

## 2019-12-17 RX ORDER — BUPROPION HYDROCHLORIDE 300 MG/1
300 TABLET ORAL EVERY MORNING
Status: ON HOLD | COMMUNITY
End: 2019-12-23

## 2019-12-17 RX ADMIN — ALBUTEROL SULFATE 2.5 MG: 2.5 SOLUTION RESPIRATORY (INHALATION) at 13:52

## 2019-12-17 RX ADMIN — ALBUTEROL SULFATE 2.5 MG: 2.5 SOLUTION RESPIRATORY (INHALATION) at 10:36

## 2019-12-17 RX ADMIN — LIDOCAINE HYDROCHLORIDE,EPINEPHRINE BITARTRATE 1 ML: 10; .01 INJECTION, SOLUTION INFILTRATION; PERINEURAL at 12:00

## 2019-12-17 RX ADMIN — FAMOTIDINE 20 MG: 20 TABLET ORAL at 13:50

## 2019-12-17 RX ADMIN — NICOTINE 1 PATCH: 7 PATCH TRANSDERMAL at 17:14

## 2019-12-17 RX ADMIN — SODIUM CHLORIDE 1000 ML: 9 INJECTION, SOLUTION INTRAVENOUS at 11:13

## 2019-12-17 RX ADMIN — FAMOTIDINE 20 MG: 20 TABLET ORAL at 22:25

## 2019-12-17 RX ADMIN — POVIDONE IODINE: 100 SOLUTION TOPICAL at 12:17

## 2019-12-17 RX ADMIN — SODIUM CHLORIDE: 9 INJECTION, SOLUTION INTRAVENOUS at 13:50

## 2019-12-17 RX ADMIN — SODIUM CHLORIDE 1000 ML: 9 INJECTION, SOLUTION INTRAVENOUS at 10:06

## 2019-12-17 ASSESSMENT — MIFFLIN-ST. JEOR: SCORE: 1258.98

## 2019-12-17 ASSESSMENT — ACTIVITIES OF DAILY LIVING (ADL)
ADLS_ACUITY_SCORE: 20
ADLS_ACUITY_SCORE: 20

## 2019-12-17 NOTE — PROGRESS NOTES
"NS ADMISSION NOTE    Patient admitted to room 306 at approximately 1248 via bed from emergency room. Patient was accompanied by other:nurse.     Verbal SBAR report received from KERRI Trent  prior to patient arrival.     Patient trasferred to bed via slip. Patient alert and oriented X 1. Admission vital signs: Blood pressure 118/75, pulse 96, temperature 96.9  F (36.1  C), temperature source Tympanic, resp. rate 24, height 1.626 m (5' 4\"), weight 70.4 kg (155 lb 3.2 oz), SpO2 90 %, not currently breastfeeding. Patient was oriented to plan of care, call light, bed controls, tv, telephone, bathroom and visiting hours.     Risk Assessment    The following safety risks were identified during admission: fall. Yellow risk band applied: YES.           Judit Paris    "
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
## 2019-12-17 NOTE — PLAN OF CARE
"/75   Pulse 96   Temp 96.9  F (36.1  C) (Tympanic)   Resp 24   Ht 1.626 m (5' 4\")   Wt 70.4 kg (155 lb 3.2 oz)   SpO2 90%   Breastfeeding No   BMI 26.64 kg/m    Pt is disorientated x4. Speech is garbled, incomprehensible, pt inconsistent with following commands. Able to complete neuro check. Neuro's intact. Pt is able to answer short answer yes/no questions. Pt has frequent muscle jerking/muscle spasms. Pt has several bruises/scabs scattered. Skin tear to right knee. Dressing clean dry and intact. Pt orientated to room and call light. Bed alarm on. Pt bed close to nurses station. Seizures pad on bed.   Unable to complete admission questions. Pt has been incontinent of urine. Pt has urgency/frequency with urination.       Will continue to monitor .  "

## 2019-12-17 NOTE — PHARMACY-ADMISSION MEDICATION HISTORY
Pharmacy -- Admission Medication Reconciliation In Progress    Prior to admission (PTA) medications were reviewed and the patient's PTA medication list was updated.    Sources Consulted: SureScripts    The patient is currently unable to participate in an interview.     The following changes were made per SureScripts:  CHANGED Combivent formulation  REMOVED QVAR  CHANGED wellbutrin to 300mg xl  CHANGED prilosec to BID  CHANGED lyrica to 150mg TID  ADDED fosamax, symbicort, voltaren gel, estrace cream, etodolac, naltrexone, chantix, hydroxyzine    Pharmacy will follow up with the patient when able.      Jovita Pena [facility], 12/17/2019,  2:30 PM     Patient is still not able to participate in an interview. Will follow up with patient when able.    Catrachita Pop RP on 12/18/2019 at 5:47 PM    Patient is still not able to participate in an interview. Will follow up with patient when able.    Catrachita Pop RPH on 12/19/2019 at 1:42 PM

## 2019-12-18 ENCOUNTER — APPOINTMENT (OUTPATIENT)
Dept: GENERAL RADIOLOGY | Facility: OTHER | Age: 59
DRG: 917 | End: 2019-12-18
Attending: FAMILY MEDICINE
Payer: MEDICARE

## 2019-12-18 LAB
ALBUMIN UR-MCNC: NEGATIVE MG/DL
ANION GAP SERPL CALCULATED.3IONS-SCNC: 9 MMOL/L (ref 3–14)
APPEARANCE UR: CLEAR
BILIRUB UR QL STRIP: ABNORMAL
BUN SERPL-MCNC: 14 MG/DL (ref 7–25)
CALCIUM SERPL-MCNC: 8.6 MG/DL (ref 8.6–10.3)
CHLORIDE SERPL-SCNC: 106 MMOL/L (ref 98–107)
CO2 SERPL-SCNC: 25 MMOL/L (ref 21–31)
COLOR UR AUTO: YELLOW
CREAT SERPL-MCNC: 0.9 MG/DL (ref 0.6–1.2)
ERYTHROCYTE [DISTWIDTH] IN BLOOD BY AUTOMATED COUNT: 14.7 % (ref 10–15)
GFR SERPL CREATININE-BSD FRML MDRD: 64 ML/MIN/{1.73_M2}
GLUCOSE SERPL-MCNC: 106 MG/DL (ref 70–105)
GLUCOSE UR STRIP-MCNC: NEGATIVE MG/DL
HCO3 BLD-SCNC: 22 MMOL/L (ref 22–28)
HCT VFR BLD AUTO: 30.9 % (ref 35–47)
HGB BLD-MCNC: 9.6 G/DL (ref 11.7–15.7)
HGB UR QL STRIP: NEGATIVE
KETONES UR STRIP-MCNC: NEGATIVE MG/DL
LACTATE SERPL-SCNC: 0.6 MMOL/L (ref 0.5–2.2)
LEUKOCYTE ESTERASE UR QL STRIP: NEGATIVE
MAGNESIUM SERPL-MCNC: 2.2 MG/DL (ref 1.9–2.7)
MCH RBC QN AUTO: 28.2 PG (ref 26.5–33)
MCHC RBC AUTO-ENTMCNC: 31.1 G/DL (ref 31.5–36.5)
MCV RBC AUTO: 91 FL (ref 78–100)
NITRATE UR QL: NEGATIVE
NON-SQ EPI CELLS #/AREA URNS LPF: NORMAL /LPF
O2/TOTAL GAS SETTING VFR VENT: 0 %
OXYHGB MFR BLD: 91 %
PCO2 BLD: 34 MM HG (ref 35–45)
PH BLD: 7.43 PH (ref 7.35–7.45)
PH UR STRIP: 7 PH (ref 5–9)
PLATELET # BLD AUTO: 169 10E9/L (ref 150–450)
PO2 BLD: 64 MM HG (ref 83–108)
POTASSIUM SERPL-SCNC: 3.8 MMOL/L (ref 3.5–5.1)
RBC # BLD AUTO: 3.4 10E12/L (ref 3.8–5.2)
RBC #/AREA URNS AUTO: NORMAL /HPF
SODIUM SERPL-SCNC: 140 MMOL/L (ref 134–144)
SOURCE: ABNORMAL
SP GR UR STRIP: 1.01 (ref 1–1.03)
UROBILINOGEN UR STRIP-ACNC: 0.2 EU/DL (ref 0.2–1)
WBC # BLD AUTO: 6.2 10E9/L (ref 4–11)
WBC #/AREA URNS AUTO: NORMAL /HPF

## 2019-12-18 PROCEDURE — 93005 ELECTROCARDIOGRAM TRACING: CPT

## 2019-12-18 PROCEDURE — 25800030 ZZH RX IP 258 OP 636: Performed by: FAMILY MEDICINE

## 2019-12-18 PROCEDURE — 71045 X-RAY EXAM CHEST 1 VIEW: CPT

## 2019-12-18 PROCEDURE — 94640 AIRWAY INHALATION TREATMENT: CPT | Mod: 76

## 2019-12-18 PROCEDURE — 93010 ELECTROCARDIOGRAM REPORT: CPT | Performed by: INTERNAL MEDICINE

## 2019-12-18 PROCEDURE — 82805 BLOOD GASES W/O2 SATURATION: CPT | Performed by: FAMILY MEDICINE

## 2019-12-18 PROCEDURE — 12000000 ZZH R&B MED SURG/OB

## 2019-12-18 PROCEDURE — 36415 COLL VENOUS BLD VENIPUNCTURE: CPT | Performed by: FAMILY MEDICINE

## 2019-12-18 PROCEDURE — 25000128 H RX IP 250 OP 636: Performed by: FAMILY MEDICINE

## 2019-12-18 PROCEDURE — 25000125 ZZHC RX 250: Performed by: FAMILY MEDICINE

## 2019-12-18 PROCEDURE — 85027 COMPLETE CBC AUTOMATED: CPT | Performed by: FAMILY MEDICINE

## 2019-12-18 PROCEDURE — 80048 BASIC METABOLIC PNL TOTAL CA: CPT | Performed by: FAMILY MEDICINE

## 2019-12-18 PROCEDURE — 81001 URINALYSIS AUTO W/SCOPE: CPT | Performed by: FAMILY MEDICINE

## 2019-12-18 PROCEDURE — 99233 SBSQ HOSP IP/OBS HIGH 50: CPT | Performed by: FAMILY MEDICINE

## 2019-12-18 PROCEDURE — 94640 AIRWAY INHALATION TREATMENT: CPT

## 2019-12-18 PROCEDURE — 36600 WITHDRAWAL OF ARTERIAL BLOOD: CPT | Performed by: FAMILY MEDICINE

## 2019-12-18 PROCEDURE — 83605 ASSAY OF LACTIC ACID: CPT | Performed by: FAMILY MEDICINE

## 2019-12-18 PROCEDURE — 40000275 ZZH STATISTIC RCP TIME EA 10 MIN

## 2019-12-18 PROCEDURE — 83735 ASSAY OF MAGNESIUM: CPT | Performed by: FAMILY MEDICINE

## 2019-12-18 PROCEDURE — 25000132 ZZH RX MED GY IP 250 OP 250 PS 637: Mod: GY | Performed by: FAMILY MEDICINE

## 2019-12-18 PROCEDURE — 87040 BLOOD CULTURE FOR BACTERIA: CPT | Performed by: FAMILY MEDICINE

## 2019-12-18 RX ORDER — ACETAMINOPHEN 650 MG/1
650 SUPPOSITORY RECTAL EVERY 4 HOURS PRN
Status: DISCONTINUED | OUTPATIENT
Start: 2019-12-18 | End: 2019-12-24 | Stop reason: HOSPADM

## 2019-12-18 RX ORDER — LORAZEPAM 2 MG/ML
1 INJECTION INTRAMUSCULAR EVERY 4 HOURS PRN
Status: DISCONTINUED | OUTPATIENT
Start: 2019-12-18 | End: 2019-12-18

## 2019-12-18 RX ORDER — PREGABALIN 25 MG/1
50 CAPSULE ORAL 2 TIMES DAILY
Status: DISCONTINUED | OUTPATIENT
Start: 2019-12-18 | End: 2019-12-24 | Stop reason: HOSPADM

## 2019-12-18 RX ORDER — LORAZEPAM 2 MG/ML
2 INJECTION INTRAMUSCULAR EVERY 6 HOURS
Status: DISCONTINUED | OUTPATIENT
Start: 2019-12-18 | End: 2019-12-19

## 2019-12-18 RX ORDER — LORAZEPAM 2 MG/ML
2 INJECTION INTRAMUSCULAR
Status: DISCONTINUED | OUTPATIENT
Start: 2019-12-18 | End: 2019-12-19

## 2019-12-18 RX ADMIN — ACETAMINOPHEN 650 MG: 325 TABLET, FILM COATED ORAL at 01:08

## 2019-12-18 RX ADMIN — LORAZEPAM 2 MG: 2 INJECTION, SOLUTION INTRAMUSCULAR; INTRAVENOUS at 10:28

## 2019-12-18 RX ADMIN — ACETAMINOPHEN 650 MG: 325 TABLET, FILM COATED ORAL at 09:17

## 2019-12-18 RX ADMIN — LORAZEPAM 2 MG: 2 INJECTION, SOLUTION INTRAMUSCULAR; INTRAVENOUS at 14:28

## 2019-12-18 RX ADMIN — LORAZEPAM 1 MG: 2 INJECTION, SOLUTION INTRAMUSCULAR; INTRAVENOUS at 12:28

## 2019-12-18 RX ADMIN — SODIUM CHLORIDE: 9 INJECTION, SOLUTION INTRAVENOUS at 19:50

## 2019-12-18 RX ADMIN — SODIUM CHLORIDE: 9 INJECTION, SOLUTION INTRAVENOUS at 10:30

## 2019-12-18 RX ADMIN — ACETAMINOPHEN 650 MG: 650 SUPPOSITORY RECTAL at 13:13

## 2019-12-18 RX ADMIN — FLUTICASONE PROPIONATE 1 PUFF: 110 AEROSOL, METERED RESPIRATORY (INHALATION) at 07:38

## 2019-12-18 RX ADMIN — FAMOTIDINE 20 MG: 20 TABLET ORAL at 09:16

## 2019-12-18 RX ADMIN — LORAZEPAM 2 MG: 2 INJECTION, SOLUTION INTRAMUSCULAR; INTRAVENOUS at 22:10

## 2019-12-18 RX ADMIN — NICOTINE 1 PATCH: 7 PATCH TRANSDERMAL at 09:22

## 2019-12-18 RX ADMIN — ENOXAPARIN SODIUM 40 MG: 40 INJECTION SUBCUTANEOUS at 09:16

## 2019-12-18 RX ADMIN — ALBUTEROL SULFATE 2.5 MG: 2.5 SOLUTION RESPIRATORY (INHALATION) at 10:05

## 2019-12-18 ASSESSMENT — ACTIVITIES OF DAILY LIVING (ADL)
ADLS_ACUITY_SCORE: 16
ADLS_ACUITY_SCORE: 20
ADLS_ACUITY_SCORE: 19
ADLS_ACUITY_SCORE: 16
ADLS_ACUITY_SCORE: 20
ADLS_ACUITY_SCORE: 19

## 2019-12-18 NOTE — PROGRESS NOTES
SAFETY CHECKLIST  ID Bands and Risk clasps correct and in place (DNR, Fall risk, Allergy, Latex, Limb):  Yes  All Lines Reconciled and labeled correctly: Yes  Whiteboard updated:Yes  Environmental interventions (bed/chair alarm on, call light, side rails, restraints, sitter....): Yes      Holly Monroy RN.............................12/17/2019 7:14 PM

## 2019-12-18 NOTE — PLAN OF CARE
"/68   Pulse 85   Temp 98.4  F (36.9  C) (Tympanic)   Resp 20   Ht 1.626 m (5' 4\")   Wt 70.4 kg (155 lb 3.2 oz)   SpO2 93%   Breastfeeding No   BMI 26.64 kg/m    VSS. Afebrile. Pt continues to be disorientated x4. Pt speech remains incoherent/garbled. Pt able to tell me her name and birth date during morning assessment. Pt has been picking at the air, making gestures likes she is brushing her teeth. LS noted to have exp wheezes, and crackles in the bases. BS active. HR reg, pt heart rate remains tachy. Since ativan given pt has been resting intermittently. Right knee dressing is clean, dry and intact. Mora patent. Pt reorientation provided. Bed alarms on. Will continue to monitor.   "

## 2019-12-18 NOTE — PROGRESS NOTES
MD notified of pt grabbing picking in air, and increase muscle jerking. Will continue to monitor.

## 2019-12-18 NOTE — PLAN OF CARE
"/79   Pulse 100   Temp 97.1  F (36.2  C) (Tympanic)   Resp 22   Ht 1.626 m (5' 4\")   Wt 70.4 kg (155 lb 3.2 oz)   SpO2 93%   Breastfeeding No   BMI 26.64 kg/m      Pt remains VS, HR tachy.  Pt remains confused and impulsive.  Pt talking to \"Her friend\" sitting by the window when nobody is there and laughing.  Pt up on all fours in bed and discontinued IV in left lower forearm.  Extremity movement jerky and unintentional.  Pt reaches out to pinch.  Sitter in room for pt safety.  Will continue to monitor.  Holly Monroy RN.............................12/18/2019 3:59 AM    "

## 2019-12-18 NOTE — PROGRESS NOTES
Ibeth from poison control called for an update. Ibeth recommended to give pt ativan. Call poison control if symptoms worsen if need to. [phone]

## 2019-12-18 NOTE — PLAN OF CARE
"/86   Pulse 85   Temp 98.4  F (36.9  C) (Tympanic)   Resp 20   Ht 1.626 m (5' 4\")   Wt 70.4 kg (155 lb 3.2 oz)   SpO2 96%   Breastfeeding No   BMI 26.64 kg/m      Bladder scan revealed 999 ml in bladder.  Mora catheter placed for urinary retention, immediate drainage of urine, urine sample sent to lab and unremarkable.  Holly Monroy RN.............................12/18/2019 6:15 AM    "

## 2019-12-18 NOTE — PROGRESS NOTES
SAFETY CHECKLIST  ID Bands and Risk clasps correct and in place (DNR, Fall risk, Allergy, Latex, Limb):  Yes  All Lines Reconciled and labeled correctly: Yes  Whiteboard updated:Yes  Environmental interventions (bed/chair alarm on, call light, side rails, restraints, sitter....): Yes

## 2019-12-18 NOTE — PROGRESS NOTES
PRN ativan given. Pt has been resting intermittently. Pt continues to pick, incomprehensible noises. Will continue to monitor.

## 2019-12-18 NOTE — PROGRESS NOTES
[facility]  Hospitalist Progress Note      Assessment & Plan   Deidre Braga is a 59 year old female who was admitted on 12/17/2019 medication misuse/overdose.     Delirium.   medication overdose, unclear at this time if it is intentional or unintentional.    Symptoms most consistent with anticholinergic overdose but cannot exclude SSRI or Lyrica is also contributing.  Prolonged QT on EKG, improved today. It is unclear if she is developing dementia or memory impairment or if this is all related to medication misuse.  She has prior history of opiate and alcohol addiction but states she has been sober of these substances.  She is able to tell me who she is and where she is but her mental status waxes and wanes.  At times she is tangential. Retaining urine overnight. Gagnon placed.   -tele  -CRT when clearer.   -IVF  -avoid anticholinergic medications (benadryl. Etc)  -spoke with poison control last night and again this AM. Seems to be getting worse. Will start scheduled and prn ativan  -monitor EKG re: QT  -No prior seizure history but we are holding her medications currently.  She is on seizure precautions and getting scheduled Ativan.  If able to tolerate oral will allow back low dose of Lyrica as she has chronic pain.  Some of her symptoms may be related to serotonin syndrome so continue to hold Wellbutrin at this time.  -Not likely able to tolerate a diet and not taking her medications well at this time so will allow for only clear liquid diet.  -antiemetics and tylenol prn. Avoid zofran given prolonged QT.     Chronic pain.   Hx of polysubstance abuse and alcohol addiction/dependence and opioid addiction  -avoid potentially addictive medications.   -tylenol    DVT Prophylaxis: Enoxaparin (Lovenox) SQ  Code Status: Full Code    Caryn Carballo    Interval History   Retaining urine overnight, gagnon placed.  Still waxing and waning.  I am informed she was hallucinating overnight and slightly  agitated dancing in the bed.  Has not required a one-to-one yet.  Her baseline mental status is unclear to me.  Daughter tells me she has had symptoms like this on and off for several months but is difficult for me to know if this is related to underlying neurologic or psychiatric condition or if she has been misusing her medication leading to her symptoms.     -Data reviewed today: I reviewed all new labs and imaging results over the last 24 hours. I personally reviewed the EKG tracing showing QTc normal.    Physical Exam   Temp: 98.4  F (36.9  C) Temp src: Tympanic BP: 132/86 Pulse: 85 Heart Rate: 105 Resp: 20 SpO2: 96 % O2 Device: None (Room air)    Vitals:    12/17/19 0935   Weight: 70.4 kg (155 lb 3.2 oz)     Vital Signs with Ranges  Temp:  [96.9  F (36.1  C)-98.4  F (36.9  C)] 98.4  F (36.9  C)  Pulse:  [] 85  Heart Rate:  [] 105  Resp:  [11-29] 20  BP: ()/() 132/86  SpO2:  [77 %-98 %] 96 %  I/O last 3 completed shifts:  In: 2517 [P.O.:400; I.V.:2117]  Out: 950 [Urine:950]    Constitutional: Awake.  I have to ask her to open her eyes and then she will.  Occasionally following commands.  Difficult to understand her speech.  She is eating breakfast that is not there and potentially hallucinating.  She has not been aggressive.  Appears older than her stated age and chronically ill.  Respiratory: Very coarse with wheezing throughout.  Poor inspiratory effort.  Cardiovascular: Regular rate.  No murmur.  No edema.  GI: Abdomen is soft, nontender, nondistended  Skin/Integumen: Warm, dry.  Few scattered bruises and scratches throughout.  Other:      Medications     sodium chloride 1,000 mL (12/17/19 1113)     sodium chloride 100 mL/hr at 12/17/19 1350       famotidine  20 mg Oral BID     fluticasone  1 puff Inhalation Q12H     lidocaine 1% with EPINEPHrine 1:100,000  1 mL Other Once     nicotine  1 patch Transdermal Daily     nicotine   Transdermal Q8H     nicotine   Transdermal Daily      sodium chloride (PF)  3 mL Intracatheter Q8H       Data   Recent Labs   Lab 12/18/19  0432 12/17/19  1002   WBC 6.2 4.9   HGB 9.6* 11.0*   MCV 91 91    183   INR  --  0.97    139   POTASSIUM 3.8 3.8   CHLORIDE 106 98   CO2 25 31   BUN 14 19   CR 0.90 1.12   ANIONGAP 9 10   SYD 8.6 9.1   * 106*   ALBUMIN  --  4.2   PROTTOTAL  --  6.4   BILITOTAL  --  0.4   ALKPHOS  --  61   ALT  --  42   AST  --  44*   LIPASE  --  9*       Recent Results (from the past 24 hour(s))   CT Head w/o Contrast    Narrative    PROCEDURE: CT HEAD W/O CONTRAST     HISTORY: Altered level of consciousness (LOC), unexplained.    COMPARISON: 8/10/2017    TECHNIQUE:  Helical images of the head from the foramen magnum to the  vertex were obtained without contrast.    FINDINGS: A 7 mm probable left anterior falcine meningioma is  unchanged. The ventricles and sulci are prominent, compatible with  mild, generalized volume loss. No acute intracranial hemorrhage, mass  effect, midline shift, hydrocephalus or basilar cystern effacement are  present.    The grey-white matter interface is preserved. Scattered  hypoattenuation within the supratentorial subcortical and  periventricular white matter is most compatible with mild chronic  microvascular ischemic change.     The calvarium is intact. The mastoid air cells are clear.  The  visualized paranasal sinuses are clear.      Impression    IMPRESSION: No acute intracranial hemorrhage. Unchanged CT appearance  of the brain.      MARTIN MARROQUIN MD   XR Chest Port 1 View    Narrative    PROCEDURE:  XR CHEST PORT 1 VW    HISTORY:  acute delirium.     COMPARISON:  4/11/2017    FINDINGS:   The cardiac silhouette is normal in size. The pulmonary vasculature is  normal.  There is elevation of the right hemidiaphragm. The lungs are  hypoinflated with atelectasis at the lung bases. No pleural effusion  or pneumothorax. There has been resection of the distal right  clavicle, unchanged.       Impression    IMPRESSION:  Hypoinflation with bibasilar atelectasis.      CARLOS ARGUELLO MD

## 2019-12-19 LAB
GLUCOSE SERPL-MCNC: 93 MG/DL (ref 70–105)
MAGNESIUM SERPL-MCNC: 2.2 MG/DL (ref 1.9–2.7)
POTASSIUM SERPL-SCNC: 3.8 MMOL/L (ref 3.5–5.1)

## 2019-12-19 PROCEDURE — 82947 ASSAY GLUCOSE BLOOD QUANT: CPT | Performed by: FAMILY MEDICINE

## 2019-12-19 PROCEDURE — 25000125 ZZHC RX 250: Performed by: FAMILY MEDICINE

## 2019-12-19 PROCEDURE — 36415 COLL VENOUS BLD VENIPUNCTURE: CPT | Performed by: FAMILY MEDICINE

## 2019-12-19 PROCEDURE — 20000006 ZZH R&B ICU - GICH

## 2019-12-19 PROCEDURE — 25000132 ZZH RX MED GY IP 250 OP 250 PS 637: Mod: GY | Performed by: FAMILY MEDICINE

## 2019-12-19 PROCEDURE — 99233 SBSQ HOSP IP/OBS HIGH 50: CPT | Performed by: FAMILY MEDICINE

## 2019-12-19 PROCEDURE — 84132 ASSAY OF SERUM POTASSIUM: CPT | Performed by: FAMILY MEDICINE

## 2019-12-19 PROCEDURE — 25800030 ZZH RX IP 258 OP 636: Performed by: FAMILY MEDICINE

## 2019-12-19 PROCEDURE — 94640 AIRWAY INHALATION TREATMENT: CPT

## 2019-12-19 PROCEDURE — 83735 ASSAY OF MAGNESIUM: CPT | Performed by: FAMILY MEDICINE

## 2019-12-19 PROCEDURE — 25000128 H RX IP 250 OP 636: Performed by: FAMILY MEDICINE

## 2019-12-19 PROCEDURE — 40000275 ZZH STATISTIC RCP TIME EA 10 MIN

## 2019-12-19 RX ORDER — OLANZAPINE 10 MG/2ML
5 INJECTION, POWDER, FOR SOLUTION INTRAMUSCULAR ONCE
Status: COMPLETED | OUTPATIENT
Start: 2019-12-19 | End: 2019-12-19

## 2019-12-19 RX ORDER — HALOPERIDOL 5 MG/ML
2 INJECTION INTRAMUSCULAR EVERY 6 HOURS PRN
Status: DISCONTINUED | OUTPATIENT
Start: 2019-12-19 | End: 2019-12-24 | Stop reason: HOSPADM

## 2019-12-19 RX ORDER — LORAZEPAM 2 MG/ML
1 INJECTION INTRAMUSCULAR
Status: DISCONTINUED | OUTPATIENT
Start: 2019-12-19 | End: 2019-12-24 | Stop reason: HOSPADM

## 2019-12-19 RX ORDER — DEXMEDETOMIDINE HYDROCHLORIDE 4 UG/ML
0.2-0.7 INJECTION, SOLUTION INTRAVENOUS CONTINUOUS
Status: DISCONTINUED | OUTPATIENT
Start: 2019-12-19 | End: 2019-12-19 | Stop reason: CLARIF

## 2019-12-19 RX ORDER — LORAZEPAM 2 MG/ML
1 INJECTION INTRAMUSCULAR EVERY 6 HOURS
Status: DISCONTINUED | OUTPATIENT
Start: 2019-12-19 | End: 2019-12-20

## 2019-12-19 RX ORDER — CEFAZOLIN SODIUM 1 G/50ML
1 INJECTION, SOLUTION INTRAVENOUS EVERY 8 HOURS
Status: DISCONTINUED | OUTPATIENT
Start: 2019-12-19 | End: 2019-12-20

## 2019-12-19 RX ORDER — OLANZAPINE 5 MG/1
5 TABLET, ORALLY DISINTEGRATING ORAL 2 TIMES DAILY
Status: DISCONTINUED | OUTPATIENT
Start: 2019-12-19 | End: 2019-12-19

## 2019-12-19 RX ORDER — METOPROLOL TARTRATE 1 MG/ML
5 INJECTION, SOLUTION INTRAVENOUS EVERY 4 HOURS PRN
Status: DISCONTINUED | OUTPATIENT
Start: 2019-12-19 | End: 2019-12-24 | Stop reason: HOSPADM

## 2019-12-19 RX ADMIN — ENOXAPARIN SODIUM 40 MG: 40 INJECTION SUBCUTANEOUS at 11:00

## 2019-12-19 RX ADMIN — SODIUM CHLORIDE: 9 INJECTION, SOLUTION INTRAVENOUS at 19:59

## 2019-12-19 RX ADMIN — LORAZEPAM 2 MG: 2 INJECTION, SOLUTION INTRAMUSCULAR; INTRAVENOUS at 04:10

## 2019-12-19 RX ADMIN — HALOPERIDOL LACTATE 2 MG: 5 INJECTION INTRAMUSCULAR at 12:22

## 2019-12-19 RX ADMIN — NICOTINE 1 PATCH: 7 PATCH, EXTENDED RELEASE TRANSDERMAL at 11:00

## 2019-12-19 RX ADMIN — SODIUM CHLORIDE: 9 INJECTION, SOLUTION INTRAVENOUS at 05:51

## 2019-12-19 RX ADMIN — LORAZEPAM 2 MG: 2 INJECTION, SOLUTION INTRAMUSCULAR; INTRAVENOUS at 07:57

## 2019-12-19 RX ADMIN — DEXMEDETOMIDINE HYDROCHLORIDE 0.2 MCG/KG/HR: 100 INJECTION, SOLUTION INTRAVENOUS at 15:19

## 2019-12-19 RX ADMIN — CEFAZOLIN SODIUM 1 G: 1 INJECTION, SOLUTION INTRAVENOUS at 16:53

## 2019-12-19 RX ADMIN — LORAZEPAM 2 MG: 2 INJECTION, SOLUTION INTRAMUSCULAR; INTRAVENOUS at 01:02

## 2019-12-19 RX ADMIN — LORAZEPAM 1 MG: 2 INJECTION, SOLUTION INTRAMUSCULAR; INTRAVENOUS at 20:06

## 2019-12-19 RX ADMIN — ALBUTEROL SULFATE 2.5 MG: 2.5 SOLUTION RESPIRATORY (INHALATION) at 15:07

## 2019-12-19 RX ADMIN — OLANZAPINE 5 MG: 10 INJECTION, POWDER, FOR SOLUTION INTRAMUSCULAR at 08:47

## 2019-12-19 RX ADMIN — METOPROLOL TARTRATE 5 MG: 1 INJECTION, SOLUTION INTRAVENOUS at 10:58

## 2019-12-19 RX ADMIN — LORAZEPAM 1 MG: 2 INJECTION, SOLUTION INTRAMUSCULAR; INTRAVENOUS at 15:28

## 2019-12-19 ASSESSMENT — ACTIVITIES OF DAILY LIVING (ADL)
ADLS_ACUITY_SCORE: 19
ADLS_ACUITY_SCORE: 19
ADLS_ACUITY_SCORE: 23
ADLS_ACUITY_SCORE: 19
ADLS_ACUITY_SCORE: 17
ADLS_ACUITY_SCORE: 19

## 2019-12-19 ASSESSMENT — MIFFLIN-ST. JEOR: SCORE: 1265

## 2019-12-19 NOTE — PROGRESS NOTES
:     will continue to follow for possible discharge planning needs.  It was mentioned at discharge planning meeting that patient may possibly need to transfer.     JERRY Pastor on 12/19/2019 at 12:50 PM

## 2019-12-19 NOTE — PLAN OF CARE
"BP (!) 153/94   Pulse 120   Temp 100.1  F (37.8  C) (Tympanic)   Resp 24   Ht 1.626 m (5' 4\")   Wt 71 kg (156 lb 8.4 oz)   SpO2 94%   Breastfeeding No   BMI 26.87 kg/m    Pt is disorientated x4, speech is garbled/incoherent. Pt arouses to voice. Pt continues to have involuntary movements/muscle jerking. Reorientation provided. LS noted to have exp wheeze throughout. Pt has audible wheezes. Bs active. HR tachy. Pt has scattered bruising/scabs. IV site is bruised, flushes well, fluids infusing. Dressing to right knee CDI. Mora intact. Pt urine is yellow. Adequate output. Pt refuses to open mouth for writer to assist with oral fluids. Pt refuses to open mouth for oral meds.     One time dose of Zyprexa given. No change noted. Pt has been restless since start of shift. Will continue to monitor.   "

## 2019-12-19 NOTE — PLAN OF CARE
"/83 (BP Location: Right arm)   Pulse 119   Temp 98.1  F (36.7  C) (Tympanic)   Resp 22   Ht 1.626 m (5' 4\")   Wt 70.4 kg (155 lb 3.2 oz)   SpO2 90%   Breastfeeding No   BMI 26.64 kg/m      Pt disoriented x4, appears to be caught in a dream, when observed she is having full garbled conversations, laughing and crying out and moving continuously in bed.  PRN ativan administered x1 with minimal relief of symptoms, will continue to monitor.  Holly Monroy RN.............................12/19/2019 1:13 AM    "

## 2019-12-19 NOTE — PROGRESS NOTES
NSG DISCHARGE NOTE    Pt transferred to ICU at 2;30 pm. Report given to KERRI Leonardo.   Judit Paris

## 2019-12-19 NOTE — PROGRESS NOTES
[facility]  Hospitalist Progress Note      Assessment & Plan   Deidre Braga is a 59 year old female who was admitted on 12/17/2019 medication misuse/overdose.     Delirium.   medication overdose, unclear at this time if it is intentional or unintentional.   Still having symptoms overnight that are not being well controlled with Ativan.   Symptoms most consistent with anticholinergic overdose but cannot exclude SSRI or Lyrica is also contributing.  Daughter tells me she has also taken isopropyl alcohol and drank mouthwash. Unclear if she might be drinking alcohol again or other possible ingestion.     Prolonged QT on admission, improved. EKG, improved today. It is unclear if she is developing dementia or memory impairment or if this is all related to medication misuse.  She has prior history of opiate and alcohol addiction.  she is not able to give any history or follow commands today. Mora placed for urinary retention.  -tele  -CRT when clearer.   -IVF  -avoid anticholinergic medications (benadryl. Etc)  -spoke with poison control last night and again this AM. Seems to be getting worse. Initially got ativan. Should be clearing. Has not cleared. Trial of zyprexa and haldol, both also ineffective.   -spoke with psych at [facility] re: further medication recommendations.   -monitor EKG re: QT  -No prior seizure history but we are holding her medications currently.     -Not likely able to tolerate a diet and not taking her medications well at this time so will allow for only clear liquid diet.  -antiemetics and tylenol prn. Avoid zofran given prolonged QT.     Chronic pain.   Hx of polysubstance abuse and alcohol addiction/dependence and opioid addiction  -avoid potentially addictive medications.   -tylenol    DVT Prophylaxis: Enoxaparin (Lovenox) SQ  Code Status: Full Code    Caryn Carballo    Interval History   Still waxing and waning, but seems worse today. Less lucid periods, not following  commands. Daughter came to see her, was concerned. States she has done this in the past, diagnosis has not been clear. Unsure if memory impairment or other mental health issues are playing a role. Seems to have more issues around holidays or significant events such as anniversary of a lost loved one.     Not gotten much sleep.  Continues to pick at things in the air that are not there. Her baseline mental status is unclear to me.      She has had intermittent elevated temperature.  She was retaining urine so Mora was placed.  No other new changes overnight.    -Data reviewed today: I reviewed all new labs and imaging results over the last 24 hours. I personally reviewed the EKG tracing showing QTc normal.    Physical Exam   Temp: 97.9  F (36.6  C) Temp src: Tympanic BP: (!) 150/81 Pulse: 120 Heart Rate: 120 Resp: 20 SpO2: 90 % O2 Device: None (Room air)    Vitals:    12/17/19 0935 12/19/19 0100   Weight: 70.4 kg (155 lb 3.2 oz) 71 kg (156 lb 8.4 oz)     Vital Signs with Ranges  Temp:  [97.9  F (36.6  C)-102  F (38.9  C)] 97.9  F (36.6  C)  Pulse:  [113-122] 120  Heart Rate:  [110-124] 120  Resp:  [20-24] 20  BP: (107-150)/(63-89) 150/81  SpO2:  [90 %-96 %] 90 %  I/O last 3 completed shifts:  In: 1075 [I.V.:1075]  Out: 2075 [Urine:2075]    Constitutional: Awake.  I have to ask her to open her eyes and then she will but less cooperative today. Slurred speech. Picking at air and skin and hallucinating.   Appears older than her stated age and chronically ill/deconditioned  Respiratory: Very coarse with wheezing throughout.  Poor inspiratory effort.  Cardiovascular: Regular rate.  No murmur.  No edema.  GI: Abdomen is soft, nontender, nondistended  Skin/Integumen: Warm, dry. scattered bruises and scratches and areas of skin picking throughout.  Other:      Medications     sodium chloride 100 mL/hr at 12/19/19 0551       enoxaparin ANTICOAGULANT  40 mg Subcutaneous Q24H     famotidine  20 mg Oral BID     fluticasone  1  puff Inhalation Q12H     lidocaine 1% with EPINEPHrine 1:100,000  1 mL Other Once     LORazepam  2 mg Intravenous Q6H     nicotine  1 patch Transdermal Daily     nicotine   Transdermal Q8H     pregabalin  50 mg Oral BID     sodium chloride (PF)  3 mL Intracatheter Q8H       Data   Recent Labs   Lab 12/19/19  0436 12/18/19  0432 12/17/19  1002   WBC  --  6.2 4.9   HGB  --  9.6* 11.0*   MCV  --  91 91   PLT  --  169 183   INR  --   --  0.97   NA  --  140 139   POTASSIUM 3.8 3.8 3.8   CHLORIDE  --  106 98   CO2  --  25 31   BUN  --  14 19   CR  --  0.90 1.12   ANIONGAP  --  9 10   SYD  --  8.6 9.1   GLC  --  106* 106*   ALBUMIN  --   --  4.2   PROTTOTAL  --   --  6.4   BILITOTAL  --   --  0.4   ALKPHOS  --   --  61   ALT  --   --  42   AST  --   --  44*   LIPASE  --   --  9*       Recent Results (from the past 24 hour(s))   XR Chest Port 1 View    Narrative    PROCEDURE:  XR CHEST PORT 1 VW    HISTORY:  ro pna. Altered mental status    COMPARISON:  12/17/2019    FINDINGS:   The cardiac silhouette is normal in size. The pulmonary vasculature is  normal.  The lungs are hypoinflated with atelectasis at the lung  bases. This is similar to the prior study. No pleural effusion or  pneumothorax.      Impression    IMPRESSION:  Hypoinflation with basilar atelectasis, unchanged.      CARLOS ARGUELLO MD

## 2019-12-19 NOTE — PHARMACY-CONSULT NOTE
Pharmacy - Transfer Medication Reconciliation     The patient's transfer medication orders have been compared to the medication administration record and to the Prior to Admissions Medications list - any noted discrepancies were resolved with the MD.     Thank you. Pharmacy will continue to monitor.     Catrachita Pop [facility] ....................  12/19/2019   4:46 PM

## 2019-12-19 NOTE — PROGRESS NOTES
Daughter at bedside.  Tells me patient has history of ingesting isopropyl alcohol and mouthwash.  Still cannot exclude that alcohol may be contributing to her symptoms.  This could also represent withdrawal from Lyrica/wellbutrin or other.    Plan to transfer her to the intensive care unit on a Precedex drip.   ativan scheduled and prn in case this is alcohol and to help with seizure protection

## 2019-12-19 NOTE — PROGRESS NOTES
:     Obtained health care directive from patient's chart.  Health care directive was completed on May 8, 2018.   Patient's primary health care agent appointed:  Reena Sanders, daughter.  Alternate health care agent appointed:  Teddy Rangel.     JERRY Pastor on 12/19/2019 at 10:09 AM

## 2019-12-19 NOTE — PROGRESS NOTES
Patient arrived to 914 with Rosangela MANNING. Vital signs are stable. Patient is disoriented but able to follow commands. Behaviorally patient has significant mood swings and goes from laughing to crying to hitting and pinching within minutes. Will start precedex when medication available.

## 2019-12-19 NOTE — PROGRESS NOTES
Tele tech notified writer of sustained increase heart rate. Dr. Carballo notified. No new orders at this time.

## 2019-12-20 PROBLEM — R94.31 PROLONGED Q-T INTERVAL ON ECG: Status: ACTIVE | Noted: 2019-12-20

## 2019-12-20 PROBLEM — D64.9 ACUTE ANEMIA: Status: ACTIVE | Noted: 2019-12-20

## 2019-12-20 PROBLEM — L03.115 CELLULITIS OF RIGHT KNEE: Status: ACTIVE | Noted: 2019-12-20

## 2019-12-20 LAB
ANION GAP SERPL CALCULATED.3IONS-SCNC: 14 MMOL/L (ref 3–14)
BASOPHILS # BLD AUTO: 0 10E9/L (ref 0–0.2)
BASOPHILS NFR BLD AUTO: 0.1 %
BUN SERPL-MCNC: 14 MG/DL (ref 7–25)
CALCIUM SERPL-MCNC: 8.5 MG/DL (ref 8.6–10.3)
CHLORIDE SERPL-SCNC: 114 MMOL/L (ref 98–107)
CO2 SERPL-SCNC: 15 MMOL/L (ref 21–31)
CREAT SERPL-MCNC: 0.8 MG/DL (ref 0.6–1.2)
DIFFERENTIAL METHOD BLD: ABNORMAL
EOSINOPHIL # BLD AUTO: 0.1 10E9/L (ref 0–0.7)
EOSINOPHIL NFR BLD AUTO: 1 %
ERYTHROCYTE [DISTWIDTH] IN BLOOD BY AUTOMATED COUNT: 14.9 % (ref 10–15)
GFR SERPL CREATININE-BSD FRML MDRD: 73 ML/MIN/{1.73_M2}
GLUCOSE SERPL-MCNC: 110 MG/DL (ref 70–105)
HCT VFR BLD AUTO: 29.9 % (ref 35–47)
HGB BLD-MCNC: 9.4 G/DL (ref 11.7–15.7)
IMM GRANULOCYTES # BLD: 0.1 10E9/L (ref 0–0.4)
IMM GRANULOCYTES NFR BLD: 0.9 %
LYMPHOCYTES # BLD AUTO: 0.7 10E9/L (ref 0.8–5.3)
LYMPHOCYTES NFR BLD AUTO: 9.6 %
MAGNESIUM SERPL-MCNC: 2.2 MG/DL (ref 1.9–2.7)
MCH RBC QN AUTO: 28.8 PG (ref 26.5–33)
MCHC RBC AUTO-ENTMCNC: 31.4 G/DL (ref 31.5–36.5)
MCV RBC AUTO: 92 FL (ref 78–100)
MONOCYTES # BLD AUTO: 0.7 10E9/L (ref 0–1.3)
MONOCYTES NFR BLD AUTO: 8.7 %
NEUTROPHILS # BLD AUTO: 6.1 10E9/L (ref 1.6–8.3)
NEUTROPHILS NFR BLD AUTO: 79.7 %
PLATELET # BLD AUTO: 143 10E9/L (ref 150–450)
POTASSIUM SERPL-SCNC: 3.8 MMOL/L (ref 3.5–5.1)
PROCALCITONIN SERPL-MCNC: 7.49 NG/ML
RBC # BLD AUTO: 3.26 10E12/L (ref 3.8–5.2)
SODIUM SERPL-SCNC: 143 MMOL/L (ref 134–144)
WBC # BLD AUTO: 7.7 10E9/L (ref 4–11)

## 2019-12-20 PROCEDURE — 25000128 H RX IP 250 OP 636: Performed by: FAMILY MEDICINE

## 2019-12-20 PROCEDURE — 40000275 ZZH STATISTIC RCP TIME EA 10 MIN

## 2019-12-20 PROCEDURE — 25000132 ZZH RX MED GY IP 250 OP 250 PS 637: Mod: GY | Performed by: FAMILY MEDICINE

## 2019-12-20 PROCEDURE — 25000125 ZZHC RX 250: Performed by: FAMILY MEDICINE

## 2019-12-20 PROCEDURE — 99233 SBSQ HOSP IP/OBS HIGH 50: CPT | Performed by: FAMILY MEDICINE

## 2019-12-20 PROCEDURE — 87070 CULTURE OTHR SPECIMN AEROBIC: CPT | Performed by: FAMILY MEDICINE

## 2019-12-20 PROCEDURE — 25800030 ZZH RX IP 258 OP 636: Performed by: FAMILY MEDICINE

## 2019-12-20 PROCEDURE — 94640 AIRWAY INHALATION TREATMENT: CPT

## 2019-12-20 PROCEDURE — 36415 COLL VENOUS BLD VENIPUNCTURE: CPT | Performed by: FAMILY MEDICINE

## 2019-12-20 PROCEDURE — 20000006 ZZH R&B ICU - GICH

## 2019-12-20 PROCEDURE — 87040 BLOOD CULTURE FOR BACTERIA: CPT | Performed by: FAMILY MEDICINE

## 2019-12-20 PROCEDURE — 83735 ASSAY OF MAGNESIUM: CPT | Performed by: FAMILY MEDICINE

## 2019-12-20 PROCEDURE — 85025 COMPLETE CBC W/AUTO DIFF WBC: CPT | Performed by: FAMILY MEDICINE

## 2019-12-20 PROCEDURE — 80048 BASIC METABOLIC PNL TOTAL CA: CPT | Performed by: FAMILY MEDICINE

## 2019-12-20 PROCEDURE — 84145 PROCALCITONIN (PCT): CPT | Performed by: FAMILY MEDICINE

## 2019-12-20 PROCEDURE — C9113 INJ PANTOPRAZOLE SODIUM, VIA: HCPCS | Performed by: FAMILY MEDICINE

## 2019-12-20 PROCEDURE — 87077 CULTURE AEROBIC IDENTIFY: CPT | Performed by: FAMILY MEDICINE

## 2019-12-20 RX ORDER — DEXTROSE MONOHYDRATE, SODIUM CHLORIDE, AND POTASSIUM CHLORIDE 50; 1.49; 4.5 G/1000ML; G/1000ML; G/1000ML
INJECTION, SOLUTION INTRAVENOUS CONTINUOUS
Status: DISCONTINUED | OUTPATIENT
Start: 2019-12-20 | End: 2019-12-23

## 2019-12-20 RX ORDER — FENTANYL CITRATE 50 UG/ML
25 INJECTION, SOLUTION INTRAMUSCULAR; INTRAVENOUS
Status: DISCONTINUED | OUTPATIENT
Start: 2019-12-20 | End: 2019-12-20

## 2019-12-20 RX ORDER — HYDROMORPHONE HYDROCHLORIDE 1 MG/ML
.2-.4 INJECTION, SOLUTION INTRAMUSCULAR; INTRAVENOUS; SUBCUTANEOUS
Status: DISCONTINUED | OUTPATIENT
Start: 2019-12-20 | End: 2019-12-24 | Stop reason: HOSPADM

## 2019-12-20 RX ORDER — BUDESONIDE 0.5 MG/2ML
0.5 INHALANT ORAL 2 TIMES DAILY
Status: DISCONTINUED | OUTPATIENT
Start: 2019-12-20 | End: 2019-12-24 | Stop reason: HOSPADM

## 2019-12-20 RX ADMIN — VANCOMYCIN HYDROCHLORIDE 1250 MG: 1 INJECTION, POWDER, LYOPHILIZED, FOR SOLUTION INTRAVENOUS at 21:53

## 2019-12-20 RX ADMIN — VANCOMYCIN HYDROCHLORIDE 1250 MG: 1 INJECTION, POWDER, LYOPHILIZED, FOR SOLUTION INTRAVENOUS at 09:38

## 2019-12-20 RX ADMIN — CEFAZOLIN SODIUM 1 G: 1 INJECTION, SOLUTION INTRAVENOUS at 08:05

## 2019-12-20 RX ADMIN — ACETAMINOPHEN 650 MG: 650 SUPPOSITORY RECTAL at 08:31

## 2019-12-20 RX ADMIN — NICOTINE 1 PATCH: 7 PATCH, EXTENDED RELEASE TRANSDERMAL at 09:16

## 2019-12-20 RX ADMIN — ENOXAPARIN SODIUM 40 MG: 40 INJECTION SUBCUTANEOUS at 09:36

## 2019-12-20 RX ADMIN — PANTOPRAZOLE SODIUM 40 MG: 40 INJECTION, POWDER, FOR SOLUTION INTRAVENOUS at 09:37

## 2019-12-20 RX ADMIN — ACETAMINOPHEN 650 MG: 650 SUPPOSITORY RECTAL at 17:50

## 2019-12-20 RX ADMIN — HYDROMORPHONE HYDROCHLORIDE 0.4 MG: 1 INJECTION, SOLUTION INTRAMUSCULAR; INTRAVENOUS; SUBCUTANEOUS at 22:41

## 2019-12-20 RX ADMIN — CEFAZOLIN SODIUM 1 G: 1 INJECTION, SOLUTION INTRAVENOUS at 00:46

## 2019-12-20 RX ADMIN — FENTANYL CITRATE 25 MCG: 50 INJECTION, SOLUTION INTRAMUSCULAR; INTRAVENOUS at 18:43

## 2019-12-20 RX ADMIN — LORAZEPAM 1 MG: 2 INJECTION, SOLUTION INTRAMUSCULAR; INTRAVENOUS at 05:40

## 2019-12-20 RX ADMIN — DEXMEDETOMIDINE HYDROCHLORIDE 0.3 MCG/KG/HR: 100 INJECTION, SOLUTION INTRAVENOUS at 03:05

## 2019-12-20 RX ADMIN — LORAZEPAM 1 MG: 2 INJECTION, SOLUTION INTRAMUSCULAR; INTRAVENOUS at 07:31

## 2019-12-20 RX ADMIN — METOPROLOL TARTRATE 5 MG: 1 INJECTION, SOLUTION INTRAVENOUS at 16:28

## 2019-12-20 RX ADMIN — BUDESONIDE 0.5 MG: 0.5 INHALANT RESPIRATORY (INHALATION) at 19:44

## 2019-12-20 RX ADMIN — LORAZEPAM 1 MG: 2 INJECTION, SOLUTION INTRAMUSCULAR; INTRAVENOUS at 22:43

## 2019-12-20 RX ADMIN — HYDROMORPHONE HYDROCHLORIDE 0.4 MG: 1 INJECTION, SOLUTION INTRAMUSCULAR; INTRAVENOUS; SUBCUTANEOUS at 20:39

## 2019-12-20 RX ADMIN — POTASSIUM CHLORIDE, DEXTROSE MONOHYDRATE AND SODIUM CHLORIDE: 150; 5; 450 INJECTION, SOLUTION INTRAVENOUS at 09:38

## 2019-12-20 ASSESSMENT — MIFFLIN-ST. JEOR: SCORE: 1262

## 2019-12-20 ASSESSMENT — ACTIVITIES OF DAILY LIVING (ADL)
ADLS_ACUITY_SCORE: 17

## 2019-12-20 NOTE — PROGRESS NOTES
Patient is restful at this time. She is noted to have a sustained heart rate over 100 for greater than 1 hour. Will medicate with PRN Metoprolol IV. /94

## 2019-12-20 NOTE — PLAN OF CARE
Precedex drip infusing between 0.5-0.6 mcg, has been tolerating the medication & resting. BP readings began to decrease at approximately 2200 resulting in decreased rate to current level at 0.5 mcg. Patient becomes restless & agitated with cares or interactions, is not following willing to follow commands.    Daughter here this evening, does have concerns regarding discharge due to patient history of falls. Willing to discuss options with physician.    VS noted, heart rate improved over the last 24 hours to sinus rhythm.  Juliana Jeffrey RN on 12/19/2019 at 11:01 PM

## 2019-12-20 NOTE — PROGRESS NOTES
":    Met with patient's daughter/health care agent, Reena, per her request. Reena has many concerns about patient. She reports that patient's delirium happens \"regularly\". She reports that her mom has a history with drug and alcohol abuse. She believes her mom takes her medications incorrectly. She feels patient is a safety concern to her self and others in her apartment building. Discussed options such as home care, additional private pay in-home supports, and jail placement options. Reena is not sure that patient will agree to any services. Discussed possibility of vulnerable adult report being completed, if appropriate. Provided Reena with [facility] Aging Resource Guide and list of ALFs. Explained process of getting on Elderly Waiver if patient is unable to pay for services/placement.  will continue to follow.     SAL Wang on 12/20/2019 at 11:06 AM    Addendum:    Per discussion with MD, it was decided that [facility] report should be filed. This writer completed [facility] report. Web report number: [id number].    SAL Wang on 12/20/2019 at 2:44 PM    "

## 2019-12-20 NOTE — PROGRESS NOTES
Precidex drip off. Patient continues to have decreased responsiveness. She begins to moan and withdraw when touched or moved. She raises her arms and pushes at staff during cares. Her words are not understandable. She is moving herself around in bed. Moans loudly and withdraws when right leg moved. Daughter Reena is here.

## 2019-12-20 NOTE — PLAN OF CARE
Problem: Adult Inpatient Plan of Care  Goal: Plan of Care Review  12/19/2019 1834 by Jaswant Mendoza RN  Outcome: No Change  12/19/2019 1020 by Judit Paris  Outcome: No Change  Goal: Patient-Specific Goal (Individualization)  12/19/2019 1834 by Jaswant Mendoza RN  Outcome: No Change  12/19/2019 1020 by Judit Paris  Outcome: No Change  Goal: Absence of Hospital-Acquired Illness or Injury  12/19/2019 1834 by Jaswant Mendoza RN  Outcome: No Change  12/19/2019 1020 by Judit Paris  Outcome: No Change  Goal: Optimal Comfort and Wellbeing  12/19/2019 1834 by Jaswant Mendoza RN  Outcome: No Change  12/19/2019 1020 by Judit Paris  Outcome: No Change  Goal: Readiness for Transition of Care  Description  Anticipated Discharge Date:    Anticipated Discharge Location:      Needs Before Discharge:  Medical stability; mentation clearing;     Leslie Solomon RN,  12/19/2019,  11:36 AM        12/19/2019 1834 by Jaswant Mendoza RN  Outcome: No Change  12/19/2019 1020 by Judit Paris  Outcome: No Change  Goal: Rounds/Family Conference  12/19/2019 1834 by Jaswant Mendoza RN  Outcome: No Change  12/19/2019 1020 by Judit Paris  Outcome: No Change     Problem: Confusion Acute  Goal: Optimal Cognitive Function  12/19/2019 1834 by Jaswant Mendoza RN  Outcome: No Change  12/19/2019 1020 by Judit Paris  Outcome: No Change   Patient is still disoriented but is now calm with the addition of precedex. Vital signs remain stable on room air. Qtc within normal limits.

## 2019-12-20 NOTE — PROGRESS NOTES
Patient is restless, a bit agitated. She is able to communicate she has a headache. Medicated with Tylenol supp 650mg.

## 2019-12-20 NOTE — PHARMACY-VANCOMYCIN DOSING SERVICE
"Pharmacy Consult- Vancomycin Assessment    Deidre Braga is a 59 year old female admitted on 2019.    Vancomycin has been ordered per MD, for the indication of: skin and soft tissue infection    Current Antibiotic Regimen Includes: Vancomycin x 1     Patient Active Problem List   Diagnosis     Colon polyps     COPD (chronic obstructive pulmonary disease) (H)     Hypokalemia     FH: CAD (coronary artery disease)     Other specified gastritis without mention of hemorrhage     Hyperlipidemia LDL goal <130     Osteopenia     Moderate major depression (H)     GERD (gastroesophageal reflux disease)     Uncomplicated alcohol dependence (H)     Alcohol use     Chronic diarrhea     Lumbago     Chronic headache disorder     Chronic pain disorder     Fibromyalgia     Chemical dependency (H)     Drug overdose     Status post colonoscopy with polypectomy     Toxic metabolic encephalopathy     Delirium     Medication overdose       Allergies (and reaction): Sulfa drugs; Baclofen; Gabapentin; Naproxen; and Tramadol    Most recent flowsheet Weight: 71 kg (156 lb 8.4 oz)  Most recent flowsheet Height: 162.6 cm (5' 4\")      Intake/Output Summary (Last 24 hours) at 2019 0858  Last data filed at 2019 0542  Gross per 24 hour   Intake 2834.47 ml   Output 1325 ml   Net 1509.47 ml       Tmax = Temp (24hrs), Av  F (37.2  C), Min:97.8  F (36.6  C), Max:100.1  F (37.8  C)      Recent Labs   Lab Test 19  0432   WBC 6.2       Recent Labs   Lab Test 19  0515 19  0432 19  1002   BUN 14 14 19   CR 0.80 0.90 1.12       estimated creatinine clearance is 72.3 mL/min (based on SCr of 0.8 mg/dL).    Cultures Pending: blood, wound    Culture Results: blood preliminary negative, wound in process     Plan: Will begin Vancomycin 1250 mg Q12H for 48 hours.  Will adjust if clinically indicated.    Regimen Start Date: 19  Recommended Dose: 1250 mg, which provides 17.6 mg/kg/dose  Interval:Q12H  Goal " Trough: 10-15 mg/L    Thank You for the consult. Will continue to follow.    Linda Cowart [facility] ....................  12/20/2019   8:58 AM

## 2019-12-20 NOTE — PROGRESS NOTES
Patient is restless at times, kicking legs and thrashing arms. Other times she appears to be resting quietly. She becomes resistant to any cares pushing staff away and attempting to move away. Closes her eyes tightly and moves her head when attempt is made to do pupil checks. Lungs have expiratory wheezing throughout. Unable to hear any bowel sounds, by report she has had 3 BMs on the overnight shift. She moans when abdomen is palpated. Abdomen seems firm but not tight. Multiple bruises noted on body. Her Rt knee laceration has a large amount of purulent drainage, redness does not appear to have moved outside margins. She moans when knee area is touched or moved. There is an area of softness noted on the knee and when this is touched drainage comes out around the staples. Afebrile. Mora draining cloudy urine

## 2019-12-20 NOTE — PROGRESS NOTES
Patient is more active, moving more in bed. She does not give any purposeful response, continues to actively resist cares. Attempted oral care without success. She is moaning loudly but does not respond when asked to open eyes or if she is having pain. Daughter present in room

## 2019-12-20 NOTE — PROGRESS NOTES
[facility]    Hospitalist Progress Note      Assessment & Plan   Deidre Braga is a 59 year old female who was admitted on 12/17/2019 for delirium. Suspected drug ingestion or withdrawal     Principal Problem:    Delirium    Assessment: Presented confused. History of toxic metabolic encephalopathy. History of alcohol abuse and opioid abuse. History of use of isopropyl alcohol, but not acidotic on admission. On multiple psychiatric medications including Lyrica, Cymbalta, bupropion. Tox screen positive only for TCA. Patient not able to give a reliable history.   Has been on a Precedex drip, scheduled Ativan due to potential alcohol withdrawal. Unable to take home medications due to delirium. Now knee with cellulitis, this could be the cause.    Plan: Treat infection   Stop scheduled lorazepam. Continue as needed lorazepam.   Once more calm, try and wean Precedex    Active Problems:    COPD (chronic obstructive pulmonary disease) (H)    Assessment: unclear severity    Plan: budesonide nebs BID and as needed albuterol      Chemical dependency (H)    Assessment: history of polysubstance abuse. Notes from PCP, Dr Do, state patient on naltrexone. Not clear if taking. History of misuse of other substances like isopropyl alcohol.     Plan: Limit offending medications   CRT eval when oriented again      Cellulitis of right knee    Assessment: Laceration on admission. Placed on prophylactic Ancef, but condition worsened.    Plan: Stop Ancef   Wound culture   Procalcitonin   Blood cultures   Start vancomycin      Acute anemia    Assessment: potential gastritis, history of gastritis. Reported abuse of alcohol and isopropyl alcohol. Hemoglobin was normal in November at [facility]    Plan: IV pantoprazole   Monitor hemoglobin       Prolonged Q-T interval on ECG    Assessment: chronic, worse on admit at 500 ms vs previous around 460 ms    Plan: Holding Cymbalta   Heart monitor    Limit offending  medications      DVT Prophylaxis: Enoxaparin (Lovenox) SQ  Code Status: Full Code    [place] CESAR Imholte    Interval History   No purposeful response for nurse this morning. With time in room today doing exam she becomes more restless and moaning. Seems to indicate pain, but cannot describe the area. No other verbal response. Briefly opens eyes, but not able to respond. Arms flexed in corticate posture.     Abdomen seemed tender to nurse, but hard to replicate on my exam. Patient did by report have 3 BMs overnight. Daughter provided history of patient ingesting isopropyl alcohol. No bloody stool or melena.     Right knee had a laceration on admit, stapled by ED provided and placed on Ancef. Today knee is draining and red. No fever.     -Data reviewed today: I reviewed all new labs and imaging results over the last 24 hours. I personally reviewed no images or EKG's today.    Physical Exam   Temp: 97.8  F (36.6  C) Temp src: Tympanic BP: 112/71 Pulse: 101 Heart Rate: 99 Resp: 22 SpO2: 96 % O2 Device: None (Room air)    Vitals:    12/17/19 0935 12/19/19 0100   Weight: 70.4 kg (155 lb 3.2 oz) 71 kg (156 lb 8.4 oz)     Vital Signs with Ranges  Temp:  [97.8  F (36.6  C)-100.1  F (37.8  C)] 97.8  F (36.6  C)  Pulse:  [] 101  Heart Rate:  [] 99  Resp:  [14-31] 22  BP: ()/() 112/71  SpO2:  [94 %-97 %] 96 %  I/O last 3 completed shifts:  In: 2834.47 [P.O.:10; I.V.:2824.47]  Out: 1325 [Urine:1325]    General Appearance: Lethargic, opens eyes, but will not keep open. Writhing in bed.  Chest/Respiratory Exam: Clear to auscultation bilaterally  Cardiovascular Exam: Regular rate and rhythm. S1, S2, no murmur, gallop, or rubs.  Gastrointestinal Exam: Soft, not clearly tender, but difficult to tell as she keeps moaning. No abnormal masses or organomegaly.  Extremities: No pitting lower extremity edema.  Psychiatric: delirious  Skin: Right knee erythematous, indurated, warm with  drainage                Medications     dexmedetomidine 0.5 mcg/kg/hr (12/20/19 0533)     sodium chloride 75 mL/hr at 12/1960       ceFAZolin  1 g Intravenous Q8H     enoxaparin ANTICOAGULANT  40 mg Subcutaneous Q24H     famotidine  20 mg Oral BID     fluticasone  1 puff Inhalation Q12H     lidocaine 1% with EPINEPHrine 1:100,000  1 mL Other Once     nicotine  1 patch Transdermal Daily     nicotine   Transdermal Q8H     pregabalin  50 mg Oral BID     sodium chloride (PF)  3 mL Intracatheter Q8H       Data   Recent Labs   Lab 12/20/19  0515 12/19/19  0436 12/18/19  0432 12/17/19  1002   WBC  --   --  6.2 4.9   HGB  --   --  9.6* 11.0*   MCV  --   --  91 91   PLT  --   --  169 183   INR  --   --   --  0.97     --  140 139   POTASSIUM 3.8 3.8 3.8 3.8   CHLORIDE 114*  --  106 98   CO2 15*  --  25 31   BUN 14  --  14 19   CR 0.80  --  0.90 1.12   ANIONGAP 14  --  9 10   SYD 8.5*  --  8.6 9.1   * 93 106* 106*   ALBUMIN  --   --   --  4.2   PROTTOTAL  --   --   --  6.4   BILITOTAL  --   --   --  0.4   ALKPHOS  --   --   --  61   ALT  --   --   --  42   AST  --   --   --  44*   LIPASE  --   --   --  9*       No results found for this or any previous visit (from the past 24 hour(s)).

## 2019-12-20 NOTE — PROGRESS NOTES
Indigo from [facility] called for update. She recommends continuing to wean Precidex to see how patient responds when it is off

## 2019-12-20 NOTE — PROGRESS NOTES
Dr. Carballo notified of increased redness around incision. Orders for IV ancef. Will continue to monitor for signs of infection.

## 2019-12-20 NOTE — PLAN OF CARE
Patient had a restful night until approximately 0450 in which patient became agitated during personal cares and a.m. lab draw, continued moaning, attempting to sit up in bed, swatting at staff. Patient unable to follow commands or answer questions to nursing. Medication titrated and ended back up at 0.5 mcg. Ativan 1 mg IV also administered. Patient currently resting in bed.    Daughter came at the beginning of the shift, does have concerns with her mom going back home due to multiple falls and safety issues as she lives alone.    VSS, no oxygen required.  Juliana Jeffrey, RN on 12/20/2019 at 6:50 AM

## 2019-12-21 PROCEDURE — 25000132 ZZH RX MED GY IP 250 OP 250 PS 637: Mod: GY | Performed by: FAMILY MEDICINE

## 2019-12-21 PROCEDURE — 94640 AIRWAY INHALATION TREATMENT: CPT | Mod: 76

## 2019-12-21 PROCEDURE — 25000128 H RX IP 250 OP 636: Performed by: FAMILY MEDICINE

## 2019-12-21 PROCEDURE — 25800030 ZZH RX IP 258 OP 636: Performed by: FAMILY MEDICINE

## 2019-12-21 PROCEDURE — C9113 INJ PANTOPRAZOLE SODIUM, VIA: HCPCS | Performed by: FAMILY MEDICINE

## 2019-12-21 PROCEDURE — 40000275 ZZH STATISTIC RCP TIME EA 10 MIN

## 2019-12-21 PROCEDURE — 20000006 ZZH R&B ICU - GICH

## 2019-12-21 PROCEDURE — 94640 AIRWAY INHALATION TREATMENT: CPT

## 2019-12-21 PROCEDURE — 25000125 ZZHC RX 250: Performed by: FAMILY MEDICINE

## 2019-12-21 PROCEDURE — 99233 SBSQ HOSP IP/OBS HIGH 50: CPT | Performed by: FAMILY MEDICINE

## 2019-12-21 RX ORDER — KETOROLAC TROMETHAMINE 15 MG/ML
15 INJECTION, SOLUTION INTRAMUSCULAR; INTRAVENOUS EVERY 6 HOURS
Status: COMPLETED | OUTPATIENT
Start: 2019-12-21 | End: 2019-12-23

## 2019-12-21 RX ADMIN — POTASSIUM CHLORIDE, DEXTROSE MONOHYDRATE AND SODIUM CHLORIDE: 150; 5; 450 INJECTION, SOLUTION INTRAVENOUS at 00:49

## 2019-12-21 RX ADMIN — LORAZEPAM 1 MG: 2 INJECTION, SOLUTION INTRAMUSCULAR; INTRAVENOUS at 23:03

## 2019-12-21 RX ADMIN — NICOTINE 1 PATCH: 7 PATCH, EXTENDED RELEASE TRANSDERMAL at 10:22

## 2019-12-21 RX ADMIN — LORAZEPAM 1 MG: 2 INJECTION, SOLUTION INTRAMUSCULAR; INTRAVENOUS at 00:48

## 2019-12-21 RX ADMIN — BUDESONIDE 0.5 MG: 0.5 INHALANT RESPIRATORY (INHALATION) at 19:46

## 2019-12-21 RX ADMIN — VANCOMYCIN HYDROCHLORIDE 1250 MG: 1 INJECTION, POWDER, LYOPHILIZED, FOR SOLUTION INTRAVENOUS at 22:04

## 2019-12-21 RX ADMIN — POTASSIUM CHLORIDE, DEXTROSE MONOHYDRATE AND SODIUM CHLORIDE: 150; 5; 450 INJECTION, SOLUTION INTRAVENOUS at 16:13

## 2019-12-21 RX ADMIN — PREGABALIN 50 MG: 25 CAPSULE ORAL at 20:37

## 2019-12-21 RX ADMIN — KETOROLAC TROMETHAMINE 15 MG: 15 INJECTION, SOLUTION INTRAMUSCULAR; INTRAVENOUS at 22:00

## 2019-12-21 RX ADMIN — HYDROMORPHONE HYDROCHLORIDE 0.4 MG: 1 INJECTION, SOLUTION INTRAMUSCULAR; INTRAVENOUS; SUBCUTANEOUS at 07:36

## 2019-12-21 RX ADMIN — HYDROMORPHONE HYDROCHLORIDE 0.4 MG: 1 INJECTION, SOLUTION INTRAMUSCULAR; INTRAVENOUS; SUBCUTANEOUS at 16:44

## 2019-12-21 RX ADMIN — VANCOMYCIN HYDROCHLORIDE 1250 MG: 1 INJECTION, POWDER, LYOPHILIZED, FOR SOLUTION INTRAVENOUS at 10:29

## 2019-12-21 RX ADMIN — PANTOPRAZOLE SODIUM 40 MG: 40 INJECTION, POWDER, FOR SOLUTION INTRAVENOUS at 08:27

## 2019-12-21 RX ADMIN — ENOXAPARIN SODIUM 40 MG: 40 INJECTION SUBCUTANEOUS at 10:22

## 2019-12-21 RX ADMIN — HYDROMORPHONE HYDROCHLORIDE 0.4 MG: 1 INJECTION, SOLUTION INTRAMUSCULAR; INTRAVENOUS; SUBCUTANEOUS at 03:35

## 2019-12-21 RX ADMIN — KETOROLAC TROMETHAMINE 15 MG: 15 INJECTION, SOLUTION INTRAMUSCULAR; INTRAVENOUS at 10:22

## 2019-12-21 RX ADMIN — HYDROMORPHONE HYDROCHLORIDE 0.4 MG: 1 INJECTION, SOLUTION INTRAMUSCULAR; INTRAVENOUS; SUBCUTANEOUS at 20:32

## 2019-12-21 RX ADMIN — KETOROLAC TROMETHAMINE 15 MG: 15 INJECTION, SOLUTION INTRAMUSCULAR; INTRAVENOUS at 16:13

## 2019-12-21 RX ADMIN — BUDESONIDE 0.5 MG: 0.5 INHALANT RESPIRATORY (INHALATION) at 06:11

## 2019-12-21 ASSESSMENT — ACTIVITIES OF DAILY LIVING (ADL)
ADLS_ACUITY_SCORE: 16
ADLS_ACUITY_SCORE: 21
ADLS_ACUITY_SCORE: 21

## 2019-12-21 ASSESSMENT — MIFFLIN-ST. JEOR: SCORE: 1302

## 2019-12-21 NOTE — PHARMACY-VANCOMYCIN DOSING SERVICE
"Pharmacy Consult- Vancomycin Assessment    Deidre Braga is a 59 year old female admitted on 2019.    Vancomycin has been ordered per MD, for the indication of: skin and soft tissue infection    Current Antibiotic Regimen Includes: Vancomycin 1250 mg IVPB every 12 hours x4    Patient Active Problem List   Diagnosis     Colon polyps     COPD (chronic obstructive pulmonary disease) (H)     Hypokalemia     FH: CAD (coronary artery disease)     Other specified gastritis without mention of hemorrhage     Hyperlipidemia LDL goal <130     Osteopenia     Moderate major depression (H)     GERD (gastroesophageal reflux disease)     Uncomplicated alcohol dependence (H)     Alcohol use     Chronic diarrhea     Lumbago     Chronic headache disorder     Chronic pain disorder     Fibromyalgia     Chemical dependency (H)     Drug overdose     Status post colonoscopy with polypectomy     Toxic metabolic encephalopathy     Delirium     Medication overdose     Cellulitis of right knee     Acute anemia     Prolonged Q-T interval on ECG       Allergies (and reaction): Sulfa drugs; Baclofen; Gabapentin; Naproxen; and Tramadol    Most recent flowsheet Weight: 74.7 kg (164 lb 10.9 oz)  Most recent flowsheet Height: 162.6 cm (5' 4\")      Intake/Output Summary (Last 24 hours) at 2019 1719  Last data filed at 2019 1600  Gross per 24 hour   Intake 956.25 ml   Output 1200 ml   Net -243.75 ml       Tmax = Temp (24hrs), Av.1  F (36.7  C), Min:97.3  F (36.3  C), Max:98.8  F (37.1  C)      Recent Labs   Lab Test 19  0845   WBC 7.7       Recent Labs   Lab Test 19  0515 19  0432 19  1002   BUN 14 14 19   CR 0.80 0.90 1.12       estimated creatinine clearance is 74 mL/min (based on SCr of 0.8 mg/dL).    Cultures Pending: wound, blood    Culture Results: wound gram positive cocci    Plan: Draw vancomycin trough at 0930 19 prior to what would be the 5th dose. Dr. Mcneill wants vancomycin continued " after 48 hours per the level in AM.    Goal Trough: 10 to 15 mg/L    Thank You for the consult. Will continue to follow.    Viviane Guzman [facility] ....................  12/21/2019   5:19 PM

## 2019-12-21 NOTE — PROGRESS NOTES
"Two hours after given dilaudid for pain patient woke moaning and crying again. When writer asked if patient was in pain patient verbalized \"yes\". Therefore dilaudid given prn along with ativan to help her settle.   "

## 2019-12-21 NOTE — PROGRESS NOTES
Dressing change to rt knee completed with Rusty. New margins marked to entire foot and leg extending to lateral thigh area as area is slightly reddened and warm to touch.  Small amount of thick purulent green drainage seeping from stables. Distal kneecap area remains boggy and proximal knee above stables is noted to be hard. Dressing change appears very painful for patient therefore pain medication given prior to procedure. Patient alert and moaning during procedure but eyes remain closed. Patient does not verbally communicate.

## 2019-12-21 NOTE — PROGRESS NOTES
Call from Wu at [facility]. She is updated on patients condition. [facility] staff will call in AM for further update

## 2019-12-21 NOTE — PROGRESS NOTES
Patient is slowly waking up and doing more appropriate responding to staff. She answers more questions when asked and her answers are appropriate. She continues to do a lot of moaning and calling out. She is found several times to be rubbing her Rt knee and acknowledges this area hurts. She is more able to reposition herself in bed. Was fairly cooperative with IV start. Her IV is placed in her lower Lt left leg as no good IV sites on arms. She has been getting her scheduled Torodol. If she continues to have persistent pain in Rt knee will use PRN narcotic to facilitate rest at HS. Daughter brought in all meds from home, Pharmacist here to go through those and will advise what to do with those after discussing with dispensing pharmacy and MD

## 2019-12-21 NOTE — PROGRESS NOTES
Patient remains essentially unchanged. She moans and yells out at intervals but is not able to relate why she does this. Answers yes when asked if she has pain but is unable to say where it hurts. Shampoo done, tolerated face and hand washing but resists other cares. She raises her arms and chicho out with most cares, turns. She consistently cries out when Rt leg is moved. Daughter relates chronic back pain. Dressing changed on Rt knee with purulent thick yellow drainage noted. Knee cap is still very red but appears to be a smaller area than yesterday. Staples intact.

## 2019-12-21 NOTE — PROGRESS NOTES
Patient is a bit more awake, looking around. She is able to take a few sips of water and 4 very small bites of applesauce but after that began to spit it out. Oral cares done with sponge to attempt to clean her lower teeth. She is resistant to this care.

## 2019-12-21 NOTE — PROGRESS NOTES
Patient restless and moaning loudly. Complains of Rt knee pain. Dressing redone, wound looks unchanged from earlier today. Continues to drain purulent  yellow drainage. Medicated with Dilaudid 0.4mg IV

## 2019-12-21 NOTE — PROGRESS NOTES
[facility]    Hospitalist Progress Note      Assessment & Plan   Deidre Braga is a 59 year old female who was admitted on 12/17/2019 for delirium. Suspected drug ingestion or withdrawal. Now with clinically apparent right knee cellulitis     Principal Problem:    Delirium    Assessment: Presented confused. History of toxic metabolic encephalopathy. History of alcohol abuse and opioid abuse. History of use of isopropyl alcohol, but not acidotic on admission. On multiple psychiatric medications including Lyrica, Cymbalta, bupropion. Tox screen positive only for TCA. Patient not able to give a reliable history.   Has been on a Precedex drip, scheduled Ativan due to potential alcohol withdrawal. Unable to take home medications due to delirium. Now knee with cellulitis, this could be the cause.   Off Precedex. Off scheduled Ativan. Slowly clearing.    Plan: Treating infection   Continue as needed lorazepam.   Consider Zyprexa if she remains agitated    Active Problems:    COPD (chronic obstructive pulmonary disease) (H)    Assessment: unclear severity    Plan: budesonide nebs BID and as needed albuterol      Chemical dependency (H)    Assessment: history of polysubstance abuse. Notes from PCP, Dr Do, state patient on naltrexone. Not clear if taking. History of misuse of other substances like isopropyl alcohol.     Plan: Limit offending medications   CRT eval when oriented again      Cellulitis of right knee    Assessment: Laceration on admission. Placed on prophylactic Ancef, but condition worsened.     Plan:  Wound culture pending   Procalcitonin elevated, recheck tomorrow   Blood cultures pending   Vancomycin day 2      Acute anemia    Assessment: potential gastritis, history of gastritis. Reported abuse of alcohol and isopropyl alcohol. Hemoglobin was normal in November at [facility]    Plan: IV pantoprazole   Monitor hemoglobin       Prolonged Q-T interval on ECG    Assessment: chronic,  worse on admit at 500 ms vs previous around 460 ms. Improved to 430 ms on telemetry.    Plan: Holding Cymbalta, bupropion, and Flexeril   Telemetry   Limit offending medications      DVT Prophylaxis: Enoxaparin (Lovenox) SQ  Code Status: DNR/DNI    Aguila CESAR Imholte    Interval History   Off Precedex drip since yesterday. Has received IV opioid and lorazepam for pain and moaning in the night. She will not localize the pain. Just often moaning. Seems behavioral. Nurses are very patient with her.     Normal BM yesterday. No fever.     Right knee has been draining and redressed. Night nurse felt that the lower leg was erythematous and outlined area, but appears normal to me today.    Daughter feels patient acting consistent with drug ingestion. Has drank isopropyl alcohol before. Found marijuana. Question of bath salts raised by staff.    -Data reviewed today: I reviewed all new labs and imaging results over the last 24 hours. I personally reviewed no images or EKG's today.  Telemetry with sinus rhythm, QTc 430 ms    Physical Exam   Temp: 97.9  F (36.6  C) Temp src: Tympanic BP: 117/72 Pulse: 96 Heart Rate: 106 Resp: 15 SpO2: 95 % O2 Device: None (Room air)    Vitals:    12/17/19 0935 12/19/19 0100 12/20/19 0700   Weight: 70.4 kg (155 lb 3.2 oz) 71 kg (156 lb 8.4 oz) 70.7 kg (155 lb 13.8 oz)     Vital Signs with Ranges  Temp:  [97.3  F (36.3  C)-98.2  F (36.8  C)] 97.9  F (36.6  C)  Pulse:  [] 96  Heart Rate:  [] 106  Resp:  [12-63] 15  BP: ()/() 117/72  SpO2:  [93 %-99 %] 95 %  I/O last 3 completed shifts:  In: 2054.25 [P.O.:30; I.V.:2024.25]  Out: 1425 [Urine:1425]    General Appearance: Lethargic, opens eyes, but will not keep open. Moaning and writhing in bed.  Chest/Respiratory Exam: Clear to auscultation bilaterally  Cardiovascular Exam: Regular rate and rhythm. S1, S2, no murmur, gallop, or rubs.  Gastrointestinal Exam: Soft, nontender  Extremities: No pitting lower extremity  edema.  Psychiatric: delirious  Skin: Right knee erythematous, indurated, warm with drainage. Appears slightly improved.        Medications     dexmedetomidine Stopped (12/20/19 1222)     dextrose 5% and 0.45% NaCl + KCl 20 mEq/L 75 mL/hr at 12/21/19 0545       budesonide  0.5 mg Nebulization BID     enoxaparin ANTICOAGULANT  40 mg Subcutaneous Q24H     ketorolac  15 mg Intravenous Q6H     lidocaine 1% with EPINEPHrine 1:100,000  1 mL Other Once     nicotine  1 patch Transdermal Daily     nicotine   Transdermal Q8H     pantoprazole (PROTONIX) IV  40 mg Intravenous Daily with breakfast     pregabalin  50 mg Oral BID     sodium chloride (PF)  3 mL Intracatheter Q8H     vancomycin (VANCOCIN) IV  1,250 mg Intravenous Q12H       Data   Recent Labs   Lab 12/20/19  0845 12/20/19  0515 12/19/19  0436 12/18/19  0432 12/17/19  1002   WBC 7.7  --   --  6.2 4.9   HGB 9.4*  --   --  9.6* 11.0*   MCV 92  --   --  91 91   *  --   --  169 183   INR  --   --   --   --  0.97   NA  --  143  --  140 139   POTASSIUM  --  3.8 3.8 3.8 3.8   CHLORIDE  --  114*  --  106 98   CO2  --  15*  --  25 31   BUN  --  14  --  14 19   CR  --  0.80  --  0.90 1.12   ANIONGAP  --  14  --  9 10   SYD  --  8.5*  --  8.6 9.1   GLC  --  110* 93 106* 106*   ALBUMIN  --   --   --   --  4.2   PROTTOTAL  --   --   --   --  6.4   BILITOTAL  --   --   --   --  0.4   ALKPHOS  --   --   --   --  61   ALT  --   --   --   --  42   AST  --   --   --   --  44*   LIPASE  --   --   --   --  9*       No results found for this or any previous visit (from the past 24 hour(s)).

## 2019-12-21 NOTE — PROGRESS NOTES
"Patient woke and began moaning and crying out. When writer asked patient what was wrong and if she was having pain, patient responded \"yes\", writer then asked were the pain was and patient was unable to verbalize location, then writer asked patient if she was having pain in her head and patient responded\"yes\". Writer then reassured patient that we would help her. Writer contacted Dr. Monroy and verbal orders received for IV Dilaudid 0.2-0.4mg PRN Q 2hrs. Also verbal orders to discontinue Fentanyl IV orders.   " Patient Education     Hiatal Hernia  The diaphragm is a thin sheet of muscle that runs across the top of the stomach. The small opening in the diaphragm where the esophagus and stomach meet is called the hiatus. When you eat, the muscle around the hiatus relaxes to let food pass from the esophagus into the stomach. The hiatus tightens to keep food and acid in the stomach.  In some people, the hiatus is too large or the muscle around it is weak. Then the top of the stomach can push upward through the hiatus. This is called a hiatal hernia. A hiatal hernia can let stomach acid flow back up the esophagus. This is called acid reflux.  Hiatal hernias are common. The cause of a hiatal hernia is not certain, but some things may make it more likely. These may include obesity, pregnancy, and vomiting or coughing.  Many people with hiatal hernia do not have symptoms. Often the symptoms are like those of GERD or acid reflux. They can include:  · Burning feeling under the breastbone (heartburn)  · Other chest discomfort  · Frequent burping  · Food coming back up into the throat or mouth  · Acid taste in the mouth  · Trouble swallowing food or liquid  · Nighttime choking, coughing, or wheezing  · Feeling full after eating only a small amount of food  · Nausea and vomiting  Treatment can reduce symptoms. This includes medicines and lifestyle changes. In severe cases, you may need surgery to tighten the hiatus.  Home care  Medicines help control symptoms, but they can't fix the hernia.   · Antacids help neutralize the normal acids in your stomach. You may find one works better than another for you.   · Acid blockers (H2 blockers) decrease acid production.   · Acid inhibitors (PPIs) decrease acid production in a different way than the blockers.   · Don't take NSAIDs such as aspirin, ibuprofen, and naproxen. These may worsen symptoms in some people. If you are taking these medicines for another medical problem, talk with your  healthcare provider before stopping them.  Lifestyle changes are important in treating your symptoms. You can help reduce or relieve your symptoms if you:  · Stop smoking and using tobacco. Also if possible, avoid second-hand smoke.  · Lose excess weight. Excess weight puts pressure on the stomach and esophagus.  · Symptoms can be worsened by certain foods. Limit or avoid fatty, fried, and spicy foods, as well as coffee, chocolate, mint, and foods with high acid content such as tomatoes and citrus fruit and juices (orange, grapefruit, lemon).   · Eat smaller amounts at a time. Don't get overfull.  · Don't use alcohol, caffeine, or tobacco. They can delay healing and worsen your symptoms.  Follow-up care  Follow up with your healthcare provider. Regular visits may be needed to check on your health. Be sure to take any medicines as prescribed and keep all your appointments. In some cases, you may need surgery to stop symptoms. Your healthcare provider will talk with you about this option if needed.  When to seek medical advice  Call your healthcare provider right away if any of these occur:  · Severe pain in your chest or abdomen  · Can’t keep down food or liquid  · Symptoms get worse  · Other symptoms as indicated by your healthcare provider  Call 911  Call 911 if any of these occur:  · Trouble breathing or swallowing  · Worsening chest pain, especially if different from usual  · Vomiting blood  · Large amounts of blood in stool  Date Last Reviewed: 2/1/2018  © 3967-9223 [employer]. [address] [place] [address], [place] [address]. All rights reserved. This information is not intended as a substitute for professional medical care. Always follow your healthcare professional's instructions.

## 2019-12-21 NOTE — PROGRESS NOTES
No change to physical or neuro assessment from midnight assessment. Patient remains disorientated x 4, is alert and opens eyes spontaneously.  VSS.

## 2019-12-22 LAB
ANION GAP SERPL CALCULATED.3IONS-SCNC: 7 MMOL/L (ref 3–14)
BACTERIA SPEC CULT: ABNORMAL
BUN SERPL-MCNC: 8 MG/DL (ref 7–25)
CALCIUM SERPL-MCNC: 8.6 MG/DL (ref 8.6–10.3)
CHLORIDE SERPL-SCNC: 111 MMOL/L (ref 98–107)
CO2 SERPL-SCNC: 23 MMOL/L (ref 21–31)
CREAT SERPL-MCNC: 0.92 MG/DL (ref 0.6–1.2)
ERYTHROCYTE [DISTWIDTH] IN BLOOD BY AUTOMATED COUNT: 14.6 % (ref 10–15)
GFR SERPL CREATININE-BSD FRML MDRD: 62 ML/MIN/{1.73_M2}
GLUCOSE SERPL-MCNC: 113 MG/DL (ref 70–105)
HCT VFR BLD AUTO: 27.9 % (ref 35–47)
HGB BLD-MCNC: 9.1 G/DL (ref 11.7–15.7)
MAGNESIUM SERPL-MCNC: 1.8 MG/DL (ref 1.9–2.7)
MCH RBC QN AUTO: 29.2 PG (ref 26.5–33)
MCHC RBC AUTO-ENTMCNC: 32.6 G/DL (ref 31.5–36.5)
MCV RBC AUTO: 89 FL (ref 78–100)
PLATELET # BLD AUTO: 154 10E9/L (ref 150–450)
POTASSIUM SERPL-SCNC: 3.5 MMOL/L (ref 3.5–5.1)
PROCALCITONIN SERPL-MCNC: 5.18 NG/ML
RBC # BLD AUTO: 3.12 10E12/L (ref 3.8–5.2)
SODIUM SERPL-SCNC: 141 MMOL/L (ref 134–144)
SPECIMEN SOURCE: ABNORMAL
VANCOMYCIN SERPL-MCNC: 23.7 UG/ML (ref 7–45)
WBC # BLD AUTO: 4.7 10E9/L (ref 4–11)

## 2019-12-22 PROCEDURE — 80202 ASSAY OF VANCOMYCIN: CPT | Performed by: FAMILY MEDICINE

## 2019-12-22 PROCEDURE — 85027 COMPLETE CBC AUTOMATED: CPT | Performed by: FAMILY MEDICINE

## 2019-12-22 PROCEDURE — 25000128 H RX IP 250 OP 636: Performed by: FAMILY MEDICINE

## 2019-12-22 PROCEDURE — 25000125 ZZHC RX 250: Performed by: FAMILY MEDICINE

## 2019-12-22 PROCEDURE — 80048 BASIC METABOLIC PNL TOTAL CA: CPT | Performed by: FAMILY MEDICINE

## 2019-12-22 PROCEDURE — 25800030 ZZH RX IP 258 OP 636: Performed by: FAMILY MEDICINE

## 2019-12-22 PROCEDURE — 84145 PROCALCITONIN (PCT): CPT | Performed by: FAMILY MEDICINE

## 2019-12-22 PROCEDURE — 40000275 ZZH STATISTIC RCP TIME EA 10 MIN

## 2019-12-22 PROCEDURE — 83735 ASSAY OF MAGNESIUM: CPT | Performed by: FAMILY MEDICINE

## 2019-12-22 PROCEDURE — 94640 AIRWAY INHALATION TREATMENT: CPT | Mod: 76

## 2019-12-22 PROCEDURE — 36415 COLL VENOUS BLD VENIPUNCTURE: CPT | Performed by: FAMILY MEDICINE

## 2019-12-22 PROCEDURE — 20000006 ZZH R&B ICU - GICH

## 2019-12-22 PROCEDURE — 94640 AIRWAY INHALATION TREATMENT: CPT

## 2019-12-22 PROCEDURE — 99232 SBSQ HOSP IP/OBS MODERATE 35: CPT | Performed by: FAMILY MEDICINE

## 2019-12-22 PROCEDURE — C9113 INJ PANTOPRAZOLE SODIUM, VIA: HCPCS | Performed by: FAMILY MEDICINE

## 2019-12-22 PROCEDURE — 25000132 ZZH RX MED GY IP 250 OP 250 PS 637: Mod: GY | Performed by: FAMILY MEDICINE

## 2019-12-22 RX ORDER — PANTOPRAZOLE SODIUM 40 MG/1
40 TABLET, DELAYED RELEASE ORAL
Status: DISCONTINUED | OUTPATIENT
Start: 2019-12-23 | End: 2019-12-24 | Stop reason: HOSPADM

## 2019-12-22 RX ADMIN — BUDESONIDE 0.5 MG: 0.5 INHALANT RESPIRATORY (INHALATION) at 06:25

## 2019-12-22 RX ADMIN — PREGABALIN 50 MG: 25 CAPSULE ORAL at 22:06

## 2019-12-22 RX ADMIN — PREGABALIN 50 MG: 25 CAPSULE ORAL at 10:18

## 2019-12-22 RX ADMIN — KETOROLAC TROMETHAMINE 15 MG: 15 INJECTION, SOLUTION INTRAMUSCULAR; INTRAVENOUS at 22:06

## 2019-12-22 RX ADMIN — POTASSIUM CHLORIDE, DEXTROSE MONOHYDRATE AND SODIUM CHLORIDE: 150; 5; 450 INJECTION, SOLUTION INTRAVENOUS at 08:05

## 2019-12-22 RX ADMIN — PANTOPRAZOLE SODIUM 40 MG: 40 INJECTION, POWDER, FOR SOLUTION INTRAVENOUS at 08:13

## 2019-12-22 RX ADMIN — KETOROLAC TROMETHAMINE 15 MG: 15 INJECTION, SOLUTION INTRAMUSCULAR; INTRAVENOUS at 10:18

## 2019-12-22 RX ADMIN — KETOROLAC TROMETHAMINE 15 MG: 15 INJECTION, SOLUTION INTRAMUSCULAR; INTRAVENOUS at 15:57

## 2019-12-22 RX ADMIN — KETOROLAC TROMETHAMINE 15 MG: 15 INJECTION, SOLUTION INTRAMUSCULAR; INTRAVENOUS at 04:25

## 2019-12-22 RX ADMIN — ENOXAPARIN SODIUM 40 MG: 40 INJECTION SUBCUTANEOUS at 10:18

## 2019-12-22 RX ADMIN — BUDESONIDE 0.5 MG: 0.5 INHALANT RESPIRATORY (INHALATION) at 18:05

## 2019-12-22 RX ADMIN — NICOTINE 1 PATCH: 7 PATCH, EXTENDED RELEASE TRANSDERMAL at 10:18

## 2019-12-22 RX ADMIN — VANCOMYCIN HYDROCHLORIDE 1500 MG: 1 INJECTION, POWDER, LYOPHILIZED, FOR SOLUTION INTRAVENOUS at 15:55

## 2019-12-22 RX ADMIN — HYDROMORPHONE HYDROCHLORIDE 0.4 MG: 1 INJECTION, SOLUTION INTRAMUSCULAR; INTRAVENOUS; SUBCUTANEOUS at 05:44

## 2019-12-22 ASSESSMENT — ACTIVITIES OF DAILY LIVING (ADL)
ADLS_ACUITY_SCORE: 21
ADLS_ACUITY_SCORE: 20
ADLS_ACUITY_SCORE: 21
ADLS_ACUITY_SCORE: 20
ADLS_ACUITY_SCORE: 19
ADLS_ACUITY_SCORE: 20

## 2019-12-22 ASSESSMENT — MIFFLIN-ST. JEOR: SCORE: 1303

## 2019-12-22 NOTE — PHARMACY-ADMISSION MEDICATION HISTORY
Pharmacy -- Admission Medication Reconciliation    Prior to admission (PTA) medications were reviewed and the patient's PTA medication list was updated.    Sources Consulted: [employer], [facility] staff member, daughter, bag of medications    The reliability of this Medication Reconciliation is: Reliability: Unreliable    The following significant changes were made:  Changed albuterol to 2 puffs  Changed cyclobenzaprine to 10 mg hs prn  Changed etodolac to prn  Removed hydroxyzine  Removed ibuprofen  Change combivent to 4 times a day prn    Did not add all of the mixed or  otcs/old rxs  Viviane Guzman RPH and nurse will go through all medications and dispose were daughter and provider direction    In addition, the patient's allergies were reviewed with the patient and updated as follows:   Allergies: Sulfa drugs; Baclofen; Gabapentin; Naproxen; and Tramadol    The pharmacist has reviewed with the patient that all personal medications should be removed from the building or locked in the belongings safe.  Patient shall only take medications ordered by the physician and administered by the nursing staff.       Medication barriers identified: multiple  medication back to ; medications popped out of bubble packs, old blister packs from a nursing home is , medications in lotion bottles, more than 100 duloxetine in an old coffee container, medications in lotion bottles   Medication adherence concerns: yes, high risk for negative outcome   Understanding of emergency medications: has naltrexone, unsure of proper use, no bottle to confirm    Viviane Guzman RPH, 2019,  7:50 AM

## 2019-12-22 NOTE — PROGRESS NOTES
Patient up to commode for BM. Transfers well with 2 assist. She is discussing her falls at home, she states she had 7 falls in one day. When asked if she could have taken more meds than she should have she states no way. She says she gets her pills in pill packs. When asked if she takes them every day like they are ordered she answers yes. Daughter had brought in all meds from her home, multiple old unopened pill packs were among them. See notes from Pharmacy

## 2019-12-22 NOTE — PROGRESS NOTES
Patient able to sit in recliner for over an hour before requesting to go back to bed. When gotten up she was able to get herself from lying to sitting on edge of bed with minimal assist. She was able to bear weight and take several steps and pivot to transfer. Understood instructions when given such as to reach back for arm of chair prior to sitting down. She was not able to feed herself as she was not able to grasp eating utensil and get it from her food to her mouth. She has no difficulty with swallowing and ate 100% of her food, mashed potatos, gravy and tomato juice. She remains unclear on what month or day it is but  remembers she is in the [facility] because of a fall. Her Rt knee did not seem to bother her during activity.

## 2019-12-22 NOTE — PLAN OF CARE
Plan of care reviewed. Patient will likely be moved to medical floor tomorrow. She is more alert when awake. Requires assistance to eat. Patient is a fall risk and will need placement close to nurses station and fall prevention plan to be maintained. She transfers well with 2 assist.

## 2019-12-22 NOTE — PROGRESS NOTES
Sravani from [facility] called. She is updated on patient condition. She states they will be signing off on this patient as she is showing continued improvement.

## 2019-12-22 NOTE — PROGRESS NOTES
Patient much more alert tonight. Patient is able to make eye contact and answer yes or no questions appropriately. Follows simple commands. Able to squeeze writers hands and move feet on command. Patient strong enough to sit up in bed and have a drink from a straw and swallow pills with pudding.

## 2019-12-22 NOTE — PROGRESS NOTES
[facility]    Hospitalist Progress Note      Assessment & Plan   Deidre Braga is a 59 year old female who was admitted on 12/17/2019 for delirium. Suspected drug ingestion or withdrawal. Now with clinically apparent right knee cellulitis     Principal Problem:    Delirium    Assessment: Presented confused. History of toxic metabolic encephalopathy. History of alcohol abuse and opioid abuse. History of use of isopropyl alcohol, but not acidotic on admission. On multiple psychiatric medications including Lyrica, Cymbalta, bupropion. Tox screen positive only for TCA. Patient not able to give a reliable history.   Was on a Precedex drip, scheduled Ativan due to potential alcohol withdrawal. Has not been taking home medications due to delirium. Knee cellulitis could be the cause.   Off Precedex. Off scheduled Ativan. Slowly clearing with time.   Today is more coherent. Pleasant. Denies any medication changes including starting or stopping her medications. No illicit medications.    Plan: Treating infection   Continue as needed lorazepam.   Consider Zyprexa if she becomes agitated   Appears to have cognitive deficit. OT to assess tomorrow.   She does not appear safe to return home. Discussion with the daughter included recommendations to have the patient placed in a secure facility to monitor her medications and behaviors.  Daughter supportive of this plan.  Patient may need guardianship.    Active Problems:    COPD (chronic obstructive pulmonary disease) (H)    Assessment: unclear severity    Plan: budesonide nebs BID and as needed albuterol      Chemical dependency (H)    Assessment: history of polysubstance abuse. Notes from PCP, Dr Do, state patient on naltrexone. Not clear if taking. History of misuse of other substances like isopropyl alcohol.     Plan: Limit offending medications   Consider CRT eval when oriented again      Cellulitis of right knee    Assessment: Laceration on admission.  Placed on prophylactic Ancef, but condition worsened.     Plan:  Wound culture pending   Procalcitonin improved   Blood cultures pending   Vancomycin day 3      Acute anemia    Assessment: potential gastritis, history of gastritis. Reported abuse of alcohol and isopropyl alcohol. Hemoglobin was normal in November at [facility]    Plan: IV pantoprazole changed to PO   Monitor hemoglobin       Prolonged Q-T interval on ECG    Assessment: chronic, worse on admit at 500 ms vs previous around 460 ms. Improved to 430 ms on telemetry.    Plan: Holding Cymbalta, bupropion, and Flexeril   Telemetry   Limit offending medications   Has Lyrica ordered for chronic pain      DVT Prophylaxis: Enoxaparin (Lovenox) SQ  Code Status: DNR/DNI    Aguila CESAR Imholte    Interval History   She has been clearing each day.  Has gone from agitated, to sedated on Precedex drip, to gradually more alert, but moaning and complaining of pain.  Today her pain is better controlled.  She has been more alert.  She even got up to the chair and ate.  Denies any illicit drug use.  Denies changing any of her medications.  History still does not appear reliable.  She does not know where she is located, date, or events.  Asked the nurse about when she could go home and was told it would be a few days, she then started giggling quite a bit.  Could not say why she was laughing.  Knee has been improving with antibiotics.  Pain has improved as cellulitis has subsided.    -Data reviewed today: I reviewed all new labs and imaging results over the last 24 hours. I personally reviewed no images or EKG's today.  Telemetry with sinus rhythm, QTc 430 ms    Physical Exam   Temp: 97.5  F (36.4  C) Temp src: Tympanic BP: (!) 134/90 Pulse: 104 Heart Rate: 96 Resp: 23 SpO2: 96 % O2 Device: None (Room air)    Vitals:    12/19/19 0100 12/20/19 0700 12/21/19 1100   Weight: 71 kg (156 lb 8.4 oz) 70.7 kg (155 lb 13.8 oz) 74.7 kg (164 lb 10.9 oz)     Vital Signs with Ranges  Temp:   [97.5  F (36.4  C)-98.8  F (37.1  C)] 97.5  F (36.4  C)  Pulse:  [] 104  Heart Rate:  [] 96  Resp:  [10-35] 23  BP: (117-155)/(70-97) 134/90  SpO2:  [72 %-99 %] 96 %  I/O last 3 completed shifts:  In: 1956.25 [I.V.:1956.25]  Out: 1775 [Urine:1775]    General Appearance: Lethargic, opens eyes, but will not keep open. Moaning and writhing in bed.  Chest/Respiratory Exam: Clear to auscultation bilaterally  Cardiovascular Exam: Regular rate and rhythm. S1, S2, no murmur, gallop, or rubs.  Gastrointestinal Exam: Soft, nontender  Extremities: No pitting lower extremity edema.  Psychiatric: delirious  Skin: Right knee erythema improved. Minimal induration. No longer warm. Drainage reduced.       Medications     dexmedetomidine Stopped (12/20/19 1222)     dextrose 5% and 0.45% NaCl + KCl 20 mEq/L 75 mL/hr at 12/22/19 0805       budesonide  0.5 mg Nebulization BID     enoxaparin ANTICOAGULANT  40 mg Subcutaneous Q24H     ketorolac  15 mg Intravenous Q6H     lidocaine 1% with EPINEPHrine 1:100,000  1 mL Other Once     nicotine  1 patch Transdermal Daily     nicotine   Transdermal Q8H     pantoprazole (PROTONIX) IV  40 mg Intravenous Daily with breakfast     pregabalin  50 mg Oral BID     sodium chloride (PF)  3 mL Intracatheter Q8H     vancomycin (VANCOCIN) IV  1,500 mg Intravenous Q18H       Data   Recent Labs   Lab 12/22/19  0515 12/20/19  0845 12/20/19  0515 12/19/19  0436 12/18/19  0432 12/17/19  1002   WBC 4.7 7.7  --   --  6.2 4.9   HGB 9.1* 9.4*  --   --  9.6* 11.0*   MCV 89 92  --   --  91 91    143*  --   --  169 183   INR  --   --   --   --   --  0.97     --  143  --  140 139   POTASSIUM 3.5  --  3.8 3.8 3.8 3.8   CHLORIDE 111*  --  114*  --  106 98   CO2 23  --  15*  --  25 31   BUN 8  --  14  --  14 19   CR 0.92  --  0.80  --  0.90 1.12   ANIONGAP 7  --  14  --  9 10   SYD 8.6  --  8.5*  --  8.6 9.1   *  --  110* 93 106* 106*   ALBUMIN  --   --   --   --   --  4.2   PROTTOTAL  --    --   --   --   --  6.4   BILITOTAL  --   --   --   --   --  0.4   ALKPHOS  --   --   --   --   --  61   ALT  --   --   --   --   --  42   AST  --   --   --   --   --  44*   LIPASE  --   --   --   --   --  9*       No results found for this or any previous visit (from the past 24 hour(s)).

## 2019-12-22 NOTE — PHARMACY-VANCOMYCIN DOSING SERVICE
"Pharmacy Consult- Vancomycin Assessment    Deidre Braga is a 59 year old female admitted on 2019.    Vancomycin has been ordered per MD, for the indication of: skin and soft tissue infection    Current Antibiotic Regimen Includes: vancomycin 1250 mg IVPB every 12 hours    Patient Active Problem List   Diagnosis     Colon polyps     COPD (chronic obstructive pulmonary disease) (H)     Hypokalemia     FH: CAD (coronary artery disease)     Other specified gastritis without mention of hemorrhage     Hyperlipidemia LDL goal <130     Osteopenia     Moderate major depression (H)     GERD (gastroesophageal reflux disease)     Uncomplicated alcohol dependence (H)     Alcohol use     Chronic diarrhea     Lumbago     Chronic headache disorder     Chronic pain disorder     Fibromyalgia     Chemical dependency (H)     Drug overdose     Status post colonoscopy with polypectomy     Toxic metabolic encephalopathy     Delirium     Medication overdose     Cellulitis of right knee     Acute anemia     Prolonged Q-T interval on ECG       Allergies (and reaction): Sulfa drugs; Baclofen; Gabapentin; Naproxen; and Tramadol    Most recent flowsheet Weight: 74.7 kg (164 lb 10.9 oz)  Most recent flowsheet Height: 162.6 cm (5' 4\")      Intake/Output Summary (Last 24 hours) at 2019 1433  Last data filed at 2019 1100  Gross per 24 hour   Intake 1956.25 ml   Output 2375 ml   Net -418.75 ml       Tmax = Temp (24hrs), Av.9  F (36.6  C), Min:97.5  F (36.4  C), Max:98.8  F (37.1  C)      Recent Labs   Lab Test 19  0515   WBC 4.7       Recent Labs   Lab Test 19  0515 19  0515 19  0432   BUN 8 14 14   CR 0.92 0.80 0.90       estimated creatinine clearance is 64.4 mL/min (based on SCr of 0.92 mg/dL).    Cultures Pending: wound, blood    Culture Results: wound heavy gram positive cocci    Vancomycin trough = 23.7 mg/l at 0929, supratherapeutic  t 1/2 ~ 11.36 hours  Vd=52.29 liters    Plan: Continue " vancomycin past 48 hours per Dr. Mcneill.  Change vancomycin to 1500 mg (~20 mg/kg/dose) IVPB every 18 hours. Give next dose today, 12/22/19 at 1600.    Goal Trough: 10 to 15 mg/L    Thank You for the consult. Will continue to follow.    Viviane Guzman [facility] ....................  12/22/2019   2:33 PM

## 2019-12-22 NOTE — PROGRESS NOTES
Patient more awake and alert today. She is more easily understood, obeys commands,  answers given are appropriate to questions asked. She is able to take PO meds with pudding, is taking fluids without difficulty. VSS. Afebrile. Rt knee is less red today and area of redness is becoming smaller. Patient has less pain when dressing change done than on previous days. Staples intact. Edges well approximated. She is able to flex and extend her Rt leg with minimal assistance.

## 2019-12-23 ENCOUNTER — APPOINTMENT (OUTPATIENT)
Dept: OCCUPATIONAL THERAPY | Facility: OTHER | Age: 59
DRG: 917 | End: 2019-12-23
Attending: FAMILY MEDICINE
Payer: MEDICARE

## 2019-12-23 ENCOUNTER — APPOINTMENT (OUTPATIENT)
Dept: PHYSICAL THERAPY | Facility: OTHER | Age: 59
DRG: 917 | End: 2019-12-23
Attending: FAMILY MEDICINE
Payer: MEDICARE

## 2019-12-23 LAB
ALBUMIN UR-MCNC: NEGATIVE MG/DL
ANION GAP SERPL CALCULATED.3IONS-SCNC: 8 MMOL/L (ref 3–14)
APPEARANCE UR: CLEAR
BACTERIA #/AREA URNS HPF: ABNORMAL /HPF
BILIRUB UR QL STRIP: NEGATIVE
BUN SERPL-MCNC: 6 MG/DL (ref 7–25)
CALCIUM SERPL-MCNC: 9 MG/DL (ref 8.6–10.3)
CHLORIDE SERPL-SCNC: 111 MMOL/L (ref 98–107)
CO2 SERPL-SCNC: 25 MMOL/L (ref 21–31)
COLOR UR AUTO: YELLOW
CREAT SERPL-MCNC: 1.03 MG/DL (ref 0.6–1.2)
ERYTHROCYTE [DISTWIDTH] IN BLOOD BY AUTOMATED COUNT: 14.4 % (ref 10–15)
GFR SERPL CREATININE-BSD FRML MDRD: 55 ML/MIN/{1.73_M2}
GLUCOSE SERPL-MCNC: 104 MG/DL (ref 70–105)
GLUCOSE UR STRIP-MCNC: NEGATIVE MG/DL
HCT VFR BLD AUTO: 30 % (ref 35–47)
HGB BLD-MCNC: 9.6 G/DL (ref 11.7–15.7)
HGB UR QL STRIP: ABNORMAL
KETONES UR STRIP-MCNC: NEGATIVE MG/DL
LEUKOCYTE ESTERASE UR QL STRIP: ABNORMAL
MAGNESIUM SERPL-MCNC: 1.7 MG/DL (ref 1.9–2.7)
MCH RBC QN AUTO: 28.7 PG (ref 26.5–33)
MCHC RBC AUTO-ENTMCNC: 32 G/DL (ref 31.5–36.5)
MCV RBC AUTO: 90 FL (ref 78–100)
MUCOUS THREADS #/AREA URNS LPF: PRESENT /LPF
NITRATE UR QL: NEGATIVE
PH UR STRIP: 6 PH (ref 5–9)
PLATELET # BLD AUTO: 175 10E9/L (ref 150–450)
POTASSIUM SERPL-SCNC: 3.7 MMOL/L (ref 3.5–5.1)
PROCALCITONIN SERPL-MCNC: 6.04 NG/ML
RBC # BLD AUTO: 3.35 10E12/L (ref 3.8–5.2)
RBC #/AREA URNS AUTO: ABNORMAL /HPF
SODIUM SERPL-SCNC: 144 MMOL/L (ref 134–144)
SOURCE: ABNORMAL
SP GR UR STRIP: 1.01 (ref 1–1.03)
UROBILINOGEN UR STRIP-ACNC: 0.2 EU/DL (ref 0.2–1)
WBC # BLD AUTO: 4.3 10E9/L (ref 4–11)
WBC #/AREA URNS AUTO: ABNORMAL /HPF

## 2019-12-23 PROCEDURE — 25800030 ZZH RX IP 258 OP 636: Performed by: FAMILY MEDICINE

## 2019-12-23 PROCEDURE — 25000125 ZZHC RX 250: Performed by: FAMILY MEDICINE

## 2019-12-23 PROCEDURE — 85027 COMPLETE CBC AUTOMATED: CPT | Performed by: FAMILY MEDICINE

## 2019-12-23 PROCEDURE — 25000132 ZZH RX MED GY IP 250 OP 250 PS 637: Mod: GY | Performed by: FAMILY MEDICINE

## 2019-12-23 PROCEDURE — 94640 AIRWAY INHALATION TREATMENT: CPT | Mod: 76

## 2019-12-23 PROCEDURE — 36415 COLL VENOUS BLD VENIPUNCTURE: CPT | Performed by: FAMILY MEDICINE

## 2019-12-23 PROCEDURE — 25000128 H RX IP 250 OP 636: Performed by: FAMILY MEDICINE

## 2019-12-23 PROCEDURE — 80048 BASIC METABOLIC PNL TOTAL CA: CPT | Performed by: FAMILY MEDICINE

## 2019-12-23 PROCEDURE — 83735 ASSAY OF MAGNESIUM: CPT | Performed by: FAMILY MEDICINE

## 2019-12-23 PROCEDURE — 84145 PROCALCITONIN (PCT): CPT | Performed by: FAMILY MEDICINE

## 2019-12-23 PROCEDURE — 97165 OT EVAL LOW COMPLEX 30 MIN: CPT | Mod: GO | Performed by: OCCUPATIONAL THERAPIST

## 2019-12-23 PROCEDURE — 81001 URINALYSIS AUTO W/SCOPE: CPT | Performed by: FAMILY MEDICINE

## 2019-12-23 PROCEDURE — 94640 AIRWAY INHALATION TREATMENT: CPT

## 2019-12-23 PROCEDURE — 40000275 ZZH STATISTIC RCP TIME EA 10 MIN

## 2019-12-23 PROCEDURE — 97530 THERAPEUTIC ACTIVITIES: CPT | Mod: GP

## 2019-12-23 PROCEDURE — 99233 SBSQ HOSP IP/OBS HIGH 50: CPT | Performed by: FAMILY MEDICINE

## 2019-12-23 PROCEDURE — 97116 GAIT TRAINING THERAPY: CPT | Mod: GP

## 2019-12-23 PROCEDURE — 97530 THERAPEUTIC ACTIVITIES: CPT | Mod: GO | Performed by: OCCUPATIONAL THERAPIST

## 2019-12-23 PROCEDURE — 12000000 ZZH R&B MED SURG/OB

## 2019-12-23 PROCEDURE — 97161 PT EVAL LOW COMPLEX 20 MIN: CPT | Mod: GP

## 2019-12-23 RX ORDER — QUETIAPINE FUMARATE 25 MG/1
12.5 TABLET, FILM COATED ORAL AT BEDTIME
Qty: 30 TABLET | Refills: 0 | Status: SHIPPED | OUTPATIENT
Start: 2019-12-23

## 2019-12-23 RX ORDER — CEPHALEXIN 500 MG/1
500 CAPSULE ORAL EVERY 12 HOURS
Qty: 6 CAPSULE | Refills: 0 | Status: SHIPPED | OUTPATIENT
Start: 2019-12-23 | End: 2020-01-01

## 2019-12-23 RX ORDER — QUETIAPINE FUMARATE 25 MG/1
12.5 TABLET, FILM COATED ORAL 2 TIMES DAILY PRN
Qty: 30 TABLET | Refills: 0 | COMMUNITY
Start: 2019-12-23

## 2019-12-23 RX ORDER — PREGABALIN 50 MG/1
50 CAPSULE ORAL 2 TIMES DAILY
Refills: 0
Start: 2019-12-23 | End: 2019-12-24

## 2019-12-23 RX ORDER — DULOXETIN HYDROCHLORIDE 30 MG/1
60 CAPSULE, DELAYED RELEASE ORAL 2 TIMES DAILY
Refills: 0 | COMMUNITY
Start: 2019-12-23

## 2019-12-23 RX ORDER — PREGABALIN 50 MG/1
150 CAPSULE ORAL 2 TIMES DAILY
Refills: 0 | COMMUNITY
Start: 2019-12-23 | End: 2019-12-23

## 2019-12-23 RX ORDER — BUPROPION HYDROCHLORIDE 150 MG/1
150 TABLET ORAL DAILY
Status: DISCONTINUED | OUTPATIENT
Start: 2019-12-23 | End: 2019-12-24 | Stop reason: HOSPADM

## 2019-12-23 RX ORDER — BUPROPION HYDROCHLORIDE 150 MG/1
150 TABLET ORAL EVERY MORNING
Qty: 30 TABLET | Refills: 0 | Status: SHIPPED | OUTPATIENT
Start: 2019-12-23

## 2019-12-23 RX ORDER — DULOXETIN HYDROCHLORIDE 30 MG/1
30 CAPSULE, DELAYED RELEASE ORAL 2 TIMES DAILY
Status: DISCONTINUED | OUTPATIENT
Start: 2019-12-23 | End: 2019-12-24 | Stop reason: HOSPADM

## 2019-12-23 RX ORDER — QUETIAPINE FUMARATE 25 MG/1
25 TABLET, FILM COATED ORAL AT BEDTIME
Status: DISCONTINUED | OUTPATIENT
Start: 2019-12-23 | End: 2019-12-23

## 2019-12-23 RX ORDER — BUPROPION HYDROCHLORIDE 150 MG/1
300 TABLET ORAL EVERY MORNING
Qty: 30 TABLET | Refills: 0 | Status: SHIPPED | OUTPATIENT
Start: 2019-12-23 | End: 2019-12-23

## 2019-12-23 RX ADMIN — KETOROLAC TROMETHAMINE 15 MG: 15 INJECTION, SOLUTION INTRAMUSCULAR; INTRAVENOUS at 03:38

## 2019-12-23 RX ADMIN — QUETIAPINE FUMARATE 12.5 MG: 25 TABLET ORAL at 22:19

## 2019-12-23 RX ADMIN — DULOXETINE HYDROCHLORIDE 30 MG: 30 CAPSULE, DELAYED RELEASE ORAL at 22:18

## 2019-12-23 RX ADMIN — POTASSIUM CHLORIDE, DEXTROSE MONOHYDRATE AND SODIUM CHLORIDE: 150; 5; 450 INJECTION, SOLUTION INTRAVENOUS at 03:37

## 2019-12-23 RX ADMIN — PREGABALIN 50 MG: 25 CAPSULE ORAL at 22:18

## 2019-12-23 RX ADMIN — BUDESONIDE 0.5 MG: 0.5 INHALANT RESPIRATORY (INHALATION) at 07:59

## 2019-12-23 RX ADMIN — PREGABALIN 50 MG: 25 CAPSULE ORAL at 09:39

## 2019-12-23 RX ADMIN — ALBUTEROL SULFATE 2.5 MG: 2.5 SOLUTION RESPIRATORY (INHALATION) at 19:13

## 2019-12-23 RX ADMIN — LORAZEPAM 1 MG: 2 INJECTION, SOLUTION INTRAMUSCULAR; INTRAVENOUS at 02:09

## 2019-12-23 RX ADMIN — DULOXETINE HYDROCHLORIDE 30 MG: 30 CAPSULE, DELAYED RELEASE ORAL at 13:39

## 2019-12-23 RX ADMIN — BUDESONIDE 0.5 MG: 0.5 INHALANT RESPIRATORY (INHALATION) at 19:13

## 2019-12-23 RX ADMIN — CEPHALEXIN 500 MG: 250 CAPSULE ORAL at 09:38

## 2019-12-23 RX ADMIN — ACETAMINOPHEN 650 MG: 325 TABLET, FILM COATED ORAL at 17:34

## 2019-12-23 RX ADMIN — BUPROPION 150 MG: 150 TABLET, EXTENDED RELEASE ORAL at 13:39

## 2019-12-23 RX ADMIN — NICOTINE 1 PATCH: 7 PATCH, EXTENDED RELEASE TRANSDERMAL at 13:40

## 2019-12-23 RX ADMIN — PANTOPRAZOLE SODIUM 40 MG: 40 TABLET, DELAYED RELEASE ORAL at 09:38

## 2019-12-23 RX ADMIN — ENOXAPARIN SODIUM 40 MG: 40 INJECTION SUBCUTANEOUS at 09:39

## 2019-12-23 RX ADMIN — NICOTINE 1 PATCH: 7 PATCH, EXTENDED RELEASE TRANSDERMAL at 09:39

## 2019-12-23 RX ADMIN — CEPHALEXIN 500 MG: 250 CAPSULE ORAL at 22:18

## 2019-12-23 ASSESSMENT — ACTIVITIES OF DAILY LIVING (ADL)
ADLS_ACUITY_SCORE: 19
ADLS_ACUITY_SCORE: 16
ADLS_ACUITY_SCORE: 19
ADLS_ACUITY_SCORE: 17
ADLS_ACUITY_SCORE: 19
ADLS_ACUITY_SCORE: 16

## 2019-12-23 NOTE — PROGRESS NOTES
Spoke with patients daughter Reena. Updated her on patients condition and events of the day. She will contact someone to bring in patients upper denture. She again expresses her concern over the possibility of patient being sent home with no supervision such as home care, she hopes patient can be placed in an assisted living. Patient able to tolerate reg diet, foods kept soft as she needs her top denture. Patient is not able to feed herself at this time, she lacks the fine motor coordination to control eating utensil.

## 2019-12-23 NOTE — PROGRESS NOTES
Patient up in chair and transferred very well back to bed with x 2 assist. Alert and orientated x 3. Appears happy and laughing with staff. Patient able to carry a conversation well today. Dressing to rt knee redressed as dressing falling off. Erythemic margins have much improved. No further bogginess noted to distal wound under stables. Not able to express any drainage at this time. Dressing change also much less painful for patient, tolerated very well. Patient able to bend joint well with no pain.

## 2019-12-23 NOTE — PROGRESS NOTES
Report given to receiving RN. Patient and belongings being sent via wheelchair with Laura NESS. Patient is alert and oriented times 3 and vitally stable at the point of transfer.

## 2019-12-23 NOTE — PROGRESS NOTES
12/23/19 1400   Quick Adds   Type of Visit Initial Occupational Therapy Evaluation   Living Environment   Lives With alone   Living Arrangements apartment   Home Accessibility no concerns   Transportation Anticipated car, drives self   Self-Care   Usual Activity Tolerance good   Current Activity Tolerance moderate   Equipment Currently Used at Home walker, rolling   Functional Level   Ambulation 1-->assistive equipment   Transferring 1-->assistive equipment   Toileting 0-->independent   Bathing 0-->independent   Dressing 0-->independent   Eating 0-->independent   Communication 0-->understands/communicates without difficulty   Swallowing 0-->swallows foods/liquids without difficulty   Cognition 1 - attention or memory deficits   Cognitive Status Examination   Orientation person;place   Level of Consciousness alert   Follows Commands (Cognition) WNL   Memory impaired   Attention No deficits were identified   Organization/Problem Solving Problem solving impaired   Visual Perception   Visual Perception No deficits were identified   Pain Assessment   Patient Currently in Pain No   Range of Motion (ROM)   ROM Quick Adds No deficits were identified   Coordination   Upper Extremity Coordination No deficits were identified   Mobility   Bed Mobility Bed mobility skill: Supine to sit   Bed Mobility Skill: Supine to Sit   Level of [place]: Supine/Sit minimum assist (75% patients effort)   Physical Assist/Nonphysical Assist: Supine/Sit 1 person assist   Transfer Skill: Bed to Chair/Chair to Bed   Level of [place]: Bed to Chair contact guard   Physical Assist/Nonphysical Assist: Bed to Chair 1 person assist   Transfer Skill: Sit to Stand   Level of [place]: Sit/Stand contact guard   Physical Assist/Nonphysical Assist: Sit/Stand 1 person assist   Clinical Impression   Criteria for Skilled Therapeutic Interventions Met yes, treatment indicated   OT Diagnosis delirium    Influenced by the following impairments  cognition and some weakness    Assessment of Occupational Performance 1-3 Performance Deficits   Identified Performance Deficits decreased endurance for functional activity    Clinical Decision Making (Complexity) Low complexity   Therapy Frequency Daily   Predicted Duration of Therapy Intervention (days/wks) 1-2 times/day    Anticipated Equipment Needs at Discharge shower chair   Anticipated Discharge Disposition Transitional Care Facility   Risks and Benefits of Treatment have been explained. Yes   Patient, Family & other staff in agreement with plan of care Yes   Total Evaluation Time   Total Evaluation Time (Minutes) 15

## 2019-12-23 NOTE — PLAN OF CARE
Discharge Planner OT   Patient plan for discharge: pt open and planning to discharge to short term rehab   Current status: Pt very pleasant and willing to participate in therapy, ambulated in hallway with FWW and CGA of 1-2 for safety, Pt was oriented to place and person, month, very appropriate during session and able to give accurate history.   Barriers to return to prior living situation: cognitive concerns, lives alone, weakness  Recommendations for discharge: on-going therapies at Short Term rehab prior to returning to apt as previous   Rationale for recommendations: see above        Entered by: Ann Alston 12/23/2019 2:43 PM

## 2019-12-23 NOTE — PROGRESS NOTES
:    Coordination with Carmita from the [facility].  They can accept patient tomorrow. They do not have transportation available and would like [facility] [employer] set up.  They will cover the cost.      Call to Berenice at [employer].  Scheduled [facility] for 0930.  [employer] will need to borrow a wheelchair for transport and return.  Andrew will call to make payment.     JERRY Pastor on 12/23/2019 at 2:56 PM

## 2019-12-23 NOTE — PROGRESS NOTES
Patient states that if feels like she had a UTI. Urine noted to be cloudy therefore sample sent for urinalysis.

## 2019-12-23 NOTE — PLAN OF CARE
"Pt is Alert and orientated to person, time, place and situation. VSS. .  BP (!) 143/97 (BP Location: Left arm)   Pulse 105   Temp 97.7  F (36.5  C) (Tympanic)   Resp 16   Ht 1.626 m (5' 4\")   Wt 74.8 kg (164 lb 14.5 oz)   SpO2 98%   Breastfeeding No   BMI 28.31 kg/m    O2 is 98% on RA. LS diminished BLL. Expiratory wheezes WEI. HR tachychardic. BS active. Mora removed. Scattered bruising and scabs throughout. Wound to right knee. Dressing was changed and now is clean dry and intact. Neuros are intact. Up in the hallway ambulating with assist of 1 with a walker. Tolerated well. Will continue to monitor.     Complaining of bilateral ear ache and burning on urination. Mora was discontinued today. Urine is blood tinged. PRN tylenol given.      Kassi Campos RN on 12/23/2019 at 2:13 PM    "

## 2019-12-23 NOTE — PROGRESS NOTES
:     Completed PAS screening.  PAS confirmation number [id number].  Faxed to JERRY [facility] on 12/23/2019 at 2:15 PM

## 2019-12-23 NOTE — PROGRESS NOTES
Admission Note    Data:  Deidre Braga admitted to 306 from ICU at 1130.      Action:  Dr. Carballo has been notified of admission. Pt oriented to unit, call light in reach.     Response:  Patient is resting in bed. A&O x4. Is calm and cooperative. All questions answered. Will continue to monitor.    Kassi Campos RN on 12/23/2019 at 12:01 PM

## 2019-12-23 NOTE — PROGRESS NOTES
Accessed Pt chart for transfer from ICU to [facility].  Report received by ICU RN.    Kassi Campos RN on 12/23/2019 at 12:01 PM

## 2019-12-23 NOTE — PHARMACY-ADMISSION MEDICATION HISTORY
Pharmacy -- Admission Medication Reconciliation Medication disposal    The following significant changes were made:  Leave for her nicotrol inhaler, Combivent inhaler #4, albuterol inhaler, prenatal vitamins, Ca/mg/zinc, potassium x2, exlax/senna    Wasted:    Ranitidine 150 mg  2014 #4  Nystatin suspension 19 ~ 200 ml  Prenatal vitamins #11  Bupropion 300 mg #58  Duloxetine 60 mg #165  topiramate 25 mg #163  ibuprofen 600 mg #10  Calcium with D,  2018 #43  Potassium 99 mg  [facility] Med Pack  Calcium, magnesium, Zinc #24  Metoprolol 50 mg #5  Chantix partial staring pack #16  Pregabalin 150 mg #185  Omeprazole 20 mg #79  Nitrofurantoin 100 mg #31  Etodolac 300 mg #52  Cyclobenzaprine 5 mg #2  Hydroxyzine 50 mg  #47  Naltrexone 50 mg #79  Alendronate 35 mg #8  Furosemide 20 mg #5      The pharmacist and nurse disposed of pregabalin in desert bin. Disposed of others in black bin.  Viviane Guzman [facility] on 2019 at 6:44 PM

## 2019-12-23 NOTE — PROGRESS NOTES
This nurse counted meds for disposal and disposed of meds with Pharmacist Lynne Guzman . All meds disposed of either in cactus container or black box. See Pharmacists note for exact count of meds disposed of

## 2019-12-23 NOTE — PHARMACY - DISCHARGE MEDICATION RECONCILIATION
Pharmacy:  Discharge Counseling and Medication Reconciliation    Deidre Braga  [address]   [facility] [id number] [phone] (home)   59 year old female  PCP: Jana Hurst    Allergies   Allergen Reactions     Sulfa Drugs Anaphylaxis     Baclofen Swelling     Gabapentin Swelling     Naproxen Swelling     Tramadol Swelling       Discharge Counseling:    Pharmacist met with patient (and/or family) today to review the medication portion of the After Visit Summary (with an emphasis on NEW medications) and to address patient's questions/concerns.    Summary of Education: Patient discharging to [facility] where her medications will be managed.         Discharge Medication Reconciliation:    Manny Chambers RPH has reviewed the patient's discharge medication orders and has compared them to the inpatient medication administration record and to what the patient was taking prior to admission - any discrepancies have been resolved.    It has been determined that the patient has an adequate supply of medications available or which can be obtained from the patient's preferred pharmacy, which HE/SHE has confirmed as: [facility] patient [An updated medication list will be faxed to the patient's pharmacy.]    Thank you for the consult.    Manny Chambers RPH........December 23, 2019 1:22 PM

## 2019-12-23 NOTE — PROGRESS NOTES
:     Spoke with patient's daughter Reena via telephone.  Discussed goals of care for patient.  Patient's daughter would like patient to receive additional support prior to returning home.  Discussed local short term rehab options and daughter is supportive of placement at [facility].     Daughter verbally given the Medicare Compare list for SNF, with associated star ratings to assist with choice for referrals/discharge planning.  Education was given to family that star ratings are updated/maintained by Medicare and can be reviewed by visiting www.medicare.gov.    Met with patient on ICU to discuss goals of care.  Patient's goal is to return home when able.  Discussed short term rehab prior to returning home.  Patient is agreeable.  Discussed local options and patient chose [facility].  Patient was given the Medicare Compare list for SNF, with associated star ratings to assist with choice for referrals/discharge planning.  Education was given to pt/family that star ratings are updated/maintained by Medicare and can be reviewed by visiting www.medicare.gov.    JERRY Pastor on 12/23/2019 at 9:49 AM    Addendum:    Faxed referral to [facility].  Spoke with Carmita and notified of referral.  Offered they can screen patient here if needed.  Inquired if they can accept today.  Awaiting response.    JERRY Pastor on 12/23/2019 at 12:06 PM

## 2019-12-23 NOTE — PROGRESS NOTES
04:00 Adult ICU PCS unchanged from assessment four hours prior.  Patient was awake all night until 04:30 this a.m.  She was restless and more confused forgetting where she was and trying to crawl out of bed.

## 2019-12-23 NOTE — PROGRESS NOTES
[facility]    Hospitalist Progress Note      Assessment & Plan   Deidre Braga is a 59 year old female who was admitted on 12/17/2019 for delirium. Suspected drug ingestion or withdrawal.  Had a laceration on admission which did not appear to get infected.  Being treated with antibiotics.  Improving.    Principal Problem:    Delirium, present on admission. Improving. History of toxic metabolic encephalopathy. History of alcohol abuse and opioid abuse. History of use of isopropyl alcohol, but not acidotic on admission. On multiple psychiatric medications including Lyrica, Cymbalta, bupropion. Tox screen positive only for TCA. Patient not able to give a reliable history.  Still denying that she took more medication than prescribed or missed doses.  However, daughter brought in medications and she has cans full of pills that are not in their bottles so I am concerned about her ability to take her medications safely at home.   Initially given Ativan.  Required transfer to ICU for Precedex drip.  She has been able to be weaned off the Precedex drip.  Ativan used as needed.  Still having some difficulty overnight.  I will add low-dose Seroquel.   Today is more coherent. Pleasant. Denies any medication changes including starting or stopping her medications. No illicit medications.    Plan: Treating infection   Continue as needed lorazepam.   OT for assessment of cognitive abilities.  Today is able to tell me who she is and the date.  Appears oriented x4.   She does not appear safe to return home. Discussion with the daughter included recommendations to have the patient placed in a secure facility to monitor her medications and behaviors.  Daughter supportive of this plan.  Patient may need guardianship.    Active Problems:    COPD (chronic obstructive pulmonary disease) (H)    Assessment: unclear severity    Plan: budesonide nebs BID and as needed albuterol      Chemical dependency (H)     Assessment: history of polysubstance abuse. Notes from PCP, Dr Do, state patient on naltrexone. Not clear if taking. History of misuse of other substances like isopropyl alcohol.     Plan: Limit offending medications   Consider CRT eval when oriented again      Cellulitis of right knee    Assessment: Laceration on admission. Initially given ancef. Wound culture growing MSSA.     Plan:  Switch to oral keflex      Acute anemia    Assessment: potential gastritis, history of gastritis. Reported abuse of alcohol and isopropyl alcohol. Hemoglobin was normal in November at [facility]    Plan: IV pantoprazole changed to PO   Monitor hemoglobin       Prolonged Q-T interval on ECG    Assessment: chronic, worse on admit at 500 ms vs previous around 460 ms. Improved to 430 ms on telemetry.    Plan: Holding Cymbalta, bupropion, and Flexeril.  Unclear which of these she was taking and how.   Limit offending medications   Has Lyrica ordered for chronic pain but at a lower dose.      DVT Prophylaxis: Enoxaparin (Lovenox) SQ  Code Status: DNR/DNI    Caryn Carballo    Interval History   Is clear for me today.  Again denies ingesting anything as a suicidal attempt or accidentally.  She thinks she takes her medications as directed but she does tell me she keeps her medications and plastic bags for when she travels.  However, daughter brought in jars full of medications that were not labeled over the weekend.  Having more difficulty at night with being confused and still having some loose hallucinations.  Otherwise oriented during the day.      Knee has been improving with antibiotics.  Pain has improved as cellulitis has subsided.    -Data reviewed today: I reviewed all new labs and imaging results over the last 24 hours. I personally reviewed no images or EKG's today.    Physical Exam   Temp: 98.1  F (36.7  C) Temp src: Temporal BP: (!) 158/109 Pulse: 102 Heart Rate: 102 Resp: 11 SpO2: 97 % O2 Device: None (Room air)    Vitals:     12/20/19 0700 12/21/19 1100 12/22/19 1000   Weight: 70.7 kg (155 lb 13.8 oz) 74.7 kg (164 lb 10.9 oz) 74.8 kg (164 lb 14.5 oz)     Vital Signs with Ranges  Temp:  [97.7  F (36.5  C)-98.1  F (36.7  C)] 98.1  F (36.7  C)  Pulse:  [] 102  Heart Rate:  [] 102  Resp:  [3-35] 11  BP: (104-158)/() 158/109  SpO2:  [72 %-100 %] 97 %  I/O last 3 completed shifts:  In: 3248 [P.O.:650; I.V.:2598]  Out: 2725 [Urine:2725]    General Appearance: Awake alert and oriented.  Sitting up in bed.  Cooperative and interactive.   Chest/Respiratory Exam: Clear to auscultation bilaterally  Cardiovascular Exam: Regular rate and rhythm. S1, S2, no murmur, gallop, or rubs.  Gastrointestinal Exam: Soft, nontender  Extremities: No pitting lower extremity edema.  Psychiatric: delirious  Skin: Right knee erythema improved. Minimal induration. No longer warm. Drainage reduced.       Medications     dexmedetomidine Stopped (12/20/19 1222)     dextrose 5% and 0.45% NaCl + KCl 20 mEq/L 50 mL/hr at 12/23/19 0545       budesonide  0.5 mg Nebulization BID     enoxaparin ANTICOAGULANT  40 mg Subcutaneous Q24H     nicotine  1 patch Transdermal Daily     nicotine   Transdermal Q8H     pantoprazole  40 mg Oral QAM AC     pregabalin  50 mg Oral BID     sodium chloride (PF)  3 mL Intracatheter Q8H     vancomycin (VANCOCIN) IV  1,500 mg Intravenous Q18H       Data   Recent Labs   Lab 12/23/19  0430 12/22/19  0515 12/20/19  0845 12/20/19  0515  12/17/19  1002   WBC 4.3 4.7 7.7  --    < > 4.9   HGB 9.6* 9.1* 9.4*  --    < > 11.0*   MCV 90 89 92  --    < > 91    154 143*  --    < > 183   INR  --   --   --   --   --  0.97    141  --  143   < > 139   POTASSIUM 3.7 3.5  --  3.8   < > 3.8   CHLORIDE 111* 111*  --  114*   < > 98   CO2 25 23  --  15*   < > 31   BUN 6* 8  --  14   < > 19   CR 1.03 0.92  --  0.80   < > 1.12   ANIONGAP 8 7  --  14   < > 10   SYD 9.0 8.6  --  8.5*   < > 9.1    113*  --  110*   < > 106*   ALBUMIN  --    --   --   --   --  4.2   PROTTOTAL  --   --   --   --   --  6.4   BILITOTAL  --   --   --   --   --  0.4   ALKPHOS  --   --   --   --   --  61   ALT  --   --   --   --   --  42   AST  --   --   --   --   --  44*   LIPASE  --   --   --   --   --  9*    < > = values in this interval not displayed.       No results found for this or any previous visit (from the past 24 hour(s)).

## 2019-12-23 NOTE — PROGRESS NOTES
12/23/19 1500   Quick Adds   Type of Visit Initial PT Evaluation   Living Environment   Lives With alone   Living Arrangements apartment   Home Accessibility no concerns   Transportation Anticipated car, drives self   Self-Care   Usual Activity Tolerance good   Current Activity Tolerance moderate   Equipment Currently Used at Home walker, rolling   Functional Level Prior   Ambulation 1-->assistive equipment   Transferring 1-->assistive equipment   Toileting 0-->independent   Bathing 0-->independent   Communication 0-->understands/communicates without difficulty   Swallowing 0-->swallows foods/liquids without difficulty   Cognition 1 - attention or memory deficits   Fall history within last six months yes   Number of times patient has fallen within last six months 1   General Information   Referring Physician [place]   Patient/Family Goals Statement return home   Precautions/Limitations fall precautions   Weight-Bearing Status - LLE full weight-bearing   Weight-Bearing Status - RLE full weight-bearing   Cognitive Status Examination   Orientation orientation to person, place and time   Level of Consciousness alert   Follows Commands and Answers Questions 100% of the time   Personal Safety and Judgment intact   Memory intact   Pain Assessment   Patient Currently in Pain No   Integumentary/Edema   Integumentary/Edema Comments wound right knee   Posture    Posture Not impaired   Range of Motion (ROM)   ROM Comment WFL LEs with exception of right knee limited due to wound infection   Strength   Strength Comments WFL LEs   Bed Mobility   Bed Mobility Comments SBA   Transfer Skills   Transfer Comments CGA   Gait   Gait Comments CGA with use of Fww for stability   Balance   Balance Comments good with Fww   Sensory Examination   Sensory Perception Comments appears intact to light touch in LEs   Coordination   Coordination no deficits were identified   General Therapy Interventions   Planned Therapy Interventions gait  training;transfer training   Clinical Impression   Criteria for Skilled Therapeutic Intervention yes, treatment indicated   PT Diagnosis impaired mobility   Influenced by the following impairments fatigue   Functional limitations due to impairments activity/gait tolerance and stability   Clinical Presentation Evolving/Changing   Clinical Decision Making (Complexity) Low complexity   Therapy Frequency Daily   Predicted Duration of Therapy Intervention (days/wks) 1-2 days   Anticipated Equipment Needs at Discharge walker   Anticipated Discharge Disposition Transitional Care Facility   Risk & Benefits of therapy have been explained Yes   Patient, Family & other staff in agreement with plan of care Yes   Total Evaluation Time   Total Evaluation Time (Minutes) 15

## 2019-12-23 NOTE — DISCHARGE SUMMARY
[facility]    Discharge Summary  Hospitalist    Date of Admission:  12/17/2019  Date of Discharge:  12/24/2019  Discharging Provider: Caryn Carballo  Date of Service (when I saw the patient): 12/24/19    Discharge Diagnoses   Principal Problem:    Delirium (12/17/2019), present on admission.  Improved.  Acute toxic encephalopathy, present on admission. Improved.   Active Problems:    COPD (chronic obstructive pulmonary disease) (H) (3/9/2010)    Chemical dependency (H) (12/17/2019)    Drug overdose (2/29/2016), present on admission.    Medication overdose (12/17/2019), present on admission.    Cellulitis of right knee (12/20/2019), laceration present on admission.  Cellulitis developed in the hospital.    Acute anemia (12/20/2019)    Prolonged Q-T interval on ECG (12/20/2019), present on admission.  Resolved.      History of Present Illness   Deidre Braga is an 59 year old female who presented with mental status change.    Hospital Course   Deidre Braga was admitted on 12/17/2019.  The following problems were addressed during her hospitalization:     Delirium, present on admission. Improving.   CT of the head was negative.  Urinalysis negative.  Blood cultures negative.  She did initially spike a fever and have urinary retention which fits with  anticholinergic overdose.  The catheter was placed.  This was removed prior to discharge and she was voiding without difficulty.  Also, prior history of toxic metabolic encephalopathy. History of alcohol abuse and opioid abuse. History of use of isopropyl alcohol, but not acidotic on admission. On multiple psychiatric medications including Lyrica, Cymbalta, bupropion. Tox screen positive only for TCA. Patient not able to give a reliable history.  Still denying that she took more medication than prescribed or missed doses.  However, daughter brought in medications and she has cans full of pills that are not in their bottles so I am concerned  about her ability to take her medications safely at home. Initially given Ativan.  Required transfer to ICU for Precedex drip.  She has been able to be weaned off the Precedex drip.  Ativan used as needed.  Still having some difficulty overnight.  I will add low-dose Seroquel for presumed hospital-acquired delirium.  Unclear if she may have some underlying undiagnosed mental health or cognitive disorder.  She is alert and cooperative today.  However, she does not appear safe to return home. Discussion with the daughter included recommendations to have the patient placed in a secure facility to monitor her medications and behaviors.  Daughter supportive of this plan.  Patient may need guardianship.  Plan is for discharge to skilled nursing facility.       COPD (chronic obstructive pulmonary disease) (H), with significant wheezing and shortness of breath on admission.  She was given scheduled nebulizers and as needed nebulizers.  Given her acute delirium I was concerned about giving her steroids and so we did not do that.  As her mental status improved so did her symptoms.  Continue with nebulizers at home after discharge.       Chemical dependency (H).  History of polysubstance abuse.  Patient states she went to treatment about 4 years ago.  She was in the [place] adult teen challenge.  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  She denies any illicit drug use or alcohol use.  She denies drinking rubbing alcohol or using mouthwash for getting intoxicated prior to this admission.  She tells me she did not intentionally overdose or take her medication inappropriately.  However, she has several extra medications at home that are not organized and mixed in bottles.  I am concerned that she is not capable of managing her medications at home alone.  Suspect her acute symptoms were from medication overdose which was unintentional and also potentially medication withdrawal.  Cannot exclude withdrawal from   controlled substances.        Cellulitis of right knee    Assessment: Laceration on admission. Initially given ancef. Wound culture growing MSSA.   She was switched to oral Keflex and this can be continued at discharge to complete course of therapy.       Acute anemia    Assessment: potential gastritis, history of gastritis. Reported abuse of alcohol and isopropyl alcohol. Hemoglobin was normal in November at [facility].  She was given IV PPI.  This was transitioned to oral medication when she was more stable.       Prolonged Q-T interval on ECG    Assessment: chronic, worse on admit at 500 ms vs previous around 460 ms. Improved to 430 ms on telemetry.  Home medications initially on hold.  I did add back lower doses of Cymbalta, Wellbutrin and Lyrica.  This needs ongoing monitoring.     Code Status: DNR/DNI     Caryn Carballo,     Significant Results and Procedures   n/a    Pending Results   These results will be followed up by PCP  Unresulted Labs Ordered in the Past 30 Days of this Admission     Date and Time Order Name Status Description    12/20/2019 0830 Blood culture Preliminary     12/20/2019 0830 Blood culture Preliminary     12/17/2019 1603 Pregabalin Level: Random In process           Code Status   DNR / DNI       Primary Care Physician   Jana Hurst    Physical Exam   Temp: 96.8  F (36  C) Temp src: Tympanic BP: (!) 153/85 Pulse: 100 Heart Rate: 107 Resp: 18 SpO2: 97 % O2 Device: None (Room air)    Vitals:    12/20/19 0700 12/21/19 1100 12/22/19 1000   Weight: 70.7 kg (155 lb 13.8 oz) 74.7 kg (164 lb 10.9 oz) 74.8 kg (164 lb 14.5 oz)     Vital Signs with Ranges  Temp:  [96.8  F (36  C)-99.4  F (37.4  C)] 96.8  F (36  C)  Pulse:  [] 100  Heart Rate:  [] 107  Resp:  [10-18] 18  BP: (124-158)/() 153/85  SpO2:  [96 %-99 %] 97 %  I/O last 3 completed shifts:  In: 471.5 [P.O.:200; I.V.:271.5]  Out: 3050 [Urine:3050]    General Appearance: Awake alert and oriented.  Sitting up in bed.   Cooperative and interactive.   Chest/Respiratory Exam: Clear to auscultation bilaterally  Cardiovascular Exam: Regular rate and rhythm. S1, S2, no murmur, gallop, or rubs.  Gastrointestinal Exam: Soft, nontender  Extremities: No pitting lower extremity edema.  Psychiatric: More coherent today.  Does wax and wane overnight.  Has been clear during the day.  Skin: Right knee erythema improved. Minimal induration. No longer warm. Drainage reduced.     Discharge Disposition   Discharged to rehabilitation facility  Condition at discharge: Stable    Consultations This Hospital Stay   SOCIAL WORK IP CONSULT  PHARMACY TO DOSE VANCO  PHYSICAL THERAPY ADULT IP CONSULT  OCCUPATIONAL THERAPY ADULT IP CONSULT  PHYSICAL THERAPY ADULT IP CONSULT  OCCUPATIONAL THERAPY ADULT IP CONSULT    Time Spent on this Encounter   ICaryn DO, personally saw the patient today and spent greater than 30 minutes discharging this patient.    Discharge Orders      General info for SNF    Length of Stay Estimate: Short Term Care: Estimated # of Days <30  Condition at Discharge: Stable  Level of care:skilled   Rehabilitation Potential: Fair  Admission H&P remains valid and up-to-date: Yes  Recent Chemotherapy: N/A  Use Nursing Home Standing Orders: Yes     Mantoux instructions    Give two-step Mantoux (PPD) Per Facility Policy Yes     Reason for your hospital stay    confusion     Additional Discharge Instructions     Wound care    Site:   knee  Instructions:  Soap and water     Daily weights    Call Provider for weight gain of more than 2 pounds per day or 5 pounds per week.     Activity - Up ad winifred     Activity - Up with assistive device     Follow-up and recommended labs and tests     Follow up with primary care provider, Jana Hurst, within 7 days to evaluate medication change and for hospital follow- up.  The following labs/tests are recommended: EKG, BMP.     DNR/DNI     Physical Therapy Adult Consult    Evaluate and treat as  clinically indicated.    Reason:  weakness     Occupational Therapy Adult Consult    Evaluate and treat as clinically indicated.    Reason:  weakness     Fall precautions     Diet    Follow this diet upon discharge: Orders Placed This Encounter      Regular Diet Adult     Discharge Medications   Current Discharge Medication List      START taking these medications    Details   cephALEXin (KEFLEX) 500 MG capsule Take 1 capsule (500 mg) by mouth every 12 hours for 3 days  Qty: 6 capsule, Refills: 0    Associated Diagnoses: Cellulitis of right knee      !! QUEtiapine (SEROQUEL) 25 MG tablet Take 0.5 tablets (12.5 mg) by mouth At Bedtime  Qty: 30 tablet, Refills: 0    Associated Diagnoses: Delirium       !! - Potential duplicate medications found. Please discuss with provider.      CONTINUE these medications which have CHANGED    Details   buPROPion (WELLBUTRIN XL) 150 MG 24 hr tablet Take 1 tablet (150 mg) by mouth every morning  Qty: 30 tablet, Refills: 0    Associated Diagnoses: Chronic low back pain, unspecified back pain laterality, unspecified whether sciatica present      DULoxetine (CYMBALTA) 30 MG capsule Take 1 capsule (30 mg) by mouth 2 times daily  Refills: 0      pregabalin (LYRICA) 50 MG capsule Take 1 capsule (50 mg) by mouth 2 times daily  Refills: 0    Associated Diagnoses: Chronic low back pain, unspecified back pain laterality, unspecified whether sciatica present         CONTINUE these medications which have NOT CHANGED    Details   !! QUEtiapine (SEROQUEL) 25 MG tablet Take 0.5 tablets (12.5 mg) by mouth 2 times daily as needed (agiatation, delirium)  Qty: 30 tablet, Refills: 0      albuterol (PROAIR HFA) 108 (90 BASE) MCG/ACT inhaler Inhale 1-2 puffs into the lungs every 4 hours as needed for shortness of breath / dyspnea. Should be every 4-6 hours  Qty: 1 Inhaler, Refills: 2    Associated Diagnoses: COPD exacerbation (H); Hyperlipidemia LDL goal <130      alendronate (FOSAMAX) 35 MG tablet Take 35  mg by mouth every 7 days      budesonide-formoterol (SYMBICORT) 160-4.5 MCG/ACT Inhaler Inhale 2 puffs into the lungs 2 times daily      diclofenac (VOLTAREN) 1 % topical gel Place 4 g onto the skin 4 times daily as needed for moderate pain      estradiol (ESTRACE) 0.1 MG/GM vaginal cream Place 1 g vaginally twice a week      etodolac (LODINE) 300 MG capsule Take 300 mg by mouth 2 times daily as needed       furosemide (LASIX) 20 MG tablet Take 20 mg by mouth daily      ipratropium - albuterol 0.5 mg/2.5 mg/3 mL (DUONEB) 0.5-2.5 (3) MG/3ML nebulization Take 1 ampule by nebulization every 6 hours as needed.      Ipratropium-Albuterol (COMBIVENT RESPIMAT)  MCG/ACT inhaler Inhale 1 puff into the lungs 4 times daily       naltrexone (DEPADE/REVIA) 50 MG tablet Take 50 mg by mouth 2 times daily      omeprazole (PRILOSEC) 20 MG DR capsule Take 20 mg by mouth 2 times daily       !! - Potential duplicate medications found. Please discuss with provider.      STOP taking these medications       cyclobenzaprine (FLEXERIL) 5 MG tablet Comments:   Reason for Stopping:         varenicline (CHANTIX JUNITO) 0.5 MG X 11 & 1 MG X 42 tablet Comments:   Reason for Stopping:             Allergies   Allergies   Allergen Reactions     Sulfa Drugs Anaphylaxis     Baclofen Swelling     Gabapentin Swelling     Naproxen Swelling     Tramadol Swelling     Data   Most Recent 3 CBC's:  Recent Labs   Lab Test 12/23/19  0430 12/22/19  0515 12/20/19  0845   WBC 4.3 4.7 7.7   HGB 9.6* 9.1* 9.4*   MCV 90 89 92    154 143*      Most Recent 3 BMP's:  Recent Labs   Lab Test 12/24/19  0520 12/23/19  0430 12/22/19  0515 12/20/19  0515   NA  --  144 141 143   POTASSIUM 3.7 3.7 3.5 3.8   CHLORIDE  --  111* 111* 114*   CO2  --  25 23 15*   BUN  --  6* 8 14   CR  --  1.03 0.92 0.80   ANIONGAP  --  8 7 14   SYD  --  9.0 8.6 8.5*   GLC  --  104 113* 110*     Most Recent 2 LFT's:  Recent Labs   Lab Test 12/17/19  1002 09/10/19  1540   AST 44* 20   ALT  42 17   ALKPHOS 61 43   BILITOTAL 0.4 0.3     Most Recent INR's and Anticoagulation Dosing History:  Anticoagulation Dose History     Recent Dosing and Labs Latest Ref Rng & Units 9/23/2018 12/17/2019    INR 0 - 1.3 1.02 0.97        Most Recent 3 Troponin's:  Recent Labs   Lab Test 09/10/19  1540   TROPI <0.030     Most Recent Cholesterol Panel:  Recent Labs   Lab Test 05/07/12  1546   CHOL 236*      HDL 67   TRIG 200*     Most Recent 6 Bacteria Isolates From Any Culture (See EPIC Reports for Culture Details):  Recent Labs   Lab Test 12/20/19  0854 12/20/19  0815 12/20/19  0545 12/18/19  1350 09/23/18  2155   CULT Heavy growth  Staphylococcus aureus  * No growth after 4 days No growth after 4 days No growth after 6 days >100,000 colonies/mL  Enterococcus faecalis  *     Most Recent TSH, T4 and A1c Labs:No lab results found.  Results for orders placed or performed during the hospital encounter of 12/17/19   CT Head w/o Contrast    Narrative    PROCEDURE: CT HEAD W/O CONTRAST     HISTORY: Altered level of consciousness (LOC), unexplained.    COMPARISON: 8/10/2017    TECHNIQUE:  Helical images of the head from the foramen magnum to the  vertex were obtained without contrast.    FINDINGS: A 7 mm probable left anterior falcine meningioma is  unchanged. The ventricles and sulci are prominent, compatible with  mild, generalized volume loss. No acute intracranial hemorrhage, mass  effect, midline shift, hydrocephalus or basilar cystern effacement are  present.    The grey-white matter interface is preserved. Scattered  hypoattenuation within the supratentorial subcortical and  periventricular white matter is most compatible with mild chronic  microvascular ischemic change.     The calvarium is intact. The mastoid air cells are clear.  The  visualized paranasal sinuses are clear.      Impression    IMPRESSION: No acute intracranial hemorrhage. Unchanged CT appearance  of the brain.      MARTIN MARROQUIN MD   XR  Chest Port 1 View    Narrative    PROCEDURE:  XR CHEST PORT 1 VW    HISTORY:  acute delirium.     COMPARISON:  4/11/2017    FINDINGS:   The cardiac silhouette is normal in size. The pulmonary vasculature is  normal.  There is elevation of the right hemidiaphragm. The lungs are  hypoinflated with atelectasis at the lung bases. No pleural effusion  or pneumothorax. There has been resection of the distal right  clavicle, unchanged.      Impression    IMPRESSION:  Hypoinflation with bibasilar atelectasis.      CARLOS ARGUELLO MD   XR Chest Port 1 View    Narrative    PROCEDURE:  XR CHEST PORT 1 VW    HISTORY:  ro pna. Altered mental status    COMPARISON:  12/17/2019    FINDINGS:   The cardiac silhouette is normal in size. The pulmonary vasculature is  normal.  The lungs are hypoinflated with atelectasis at the lung  bases. This is similar to the prior study. No pleural effusion or  pneumothorax.      Impression    IMPRESSION:  Hypoinflation with basilar atelectasis, unchanged.      CARLOS ARGUELLO MD

## 2019-12-23 NOTE — PHARMACY - DISCHARGE MEDICATION RECONCILIATION
[facility]  Part of [address] [address]    December 23, 2019    Dear Pharmacist,    Your customer, Deidre Braga, born on 1960, was recently discharged from [facility].  We have updated her medication list and want to alert you to the following:       Review of your medicines      START taking      Dose / Directions   cephALEXin 500 MG capsule  Commonly known as:  KEFLEX  Indication:  Infection of the Skin and/or Soft Tissue      Dose:  500 mg  Take 1 capsule (500 mg) by mouth every 12 hours for 3 days  Quantity:  6 capsule  Refills:  0        CONTINUE these medicines which may have CHANGED, or have new prescriptions. If we are uncertain of the size of tablets/capsules you have at home, strength may be listed as something that might have changed.      Dose / Directions   buPROPion 150 MG 24 hr tablet  Commonly known as:  WELLBUTRIN XL  This may have changed:      medication strength    how much to take      Dose:  150 mg  Take 1 tablet (150 mg) by mouth every morning  Quantity:  30 tablet  Refills:  0     DULoxetine 30 MG capsule  Commonly known as:  CYMBALTA  This may have changed:      medication strength    how much to take      Dose:  30 mg  Take 1 capsule (30 mg) by mouth 2 times daily  Refills:  0     pregabalin 50 MG capsule  Commonly known as:  LYRICA  This may have changed:      medication strength    how much to take    when to take this      Dose:  50 mg  Take 1 capsule (50 mg) by mouth 2 times daily  Refills:  0     ProAir  (90 Base) MCG/ACT inhaler  This may have changed:  how much to take  Used for:  COPD exacerbation (H), Hyperlipidemia LDL goal <130  Generic drug:  albuterol      Dose:  1-2 puff  Inhale 1-2 puffs into the lungs every 4 hours as needed for shortness of breath / dyspnea. Should be every 4-6 hours  Quantity:  1 Inhaler  Refills:  2     * QUEtiapine 25 MG tablet  Commonly known as:   SEROquel  This may have changed:  You were already taking a medication with the same name, and this prescription was added. Make sure you understand how and when to take each.      Dose:  12.5 mg  Take 0.5 tablets (12.5 mg) by mouth At Bedtime  Quantity:  30 tablet  Refills:  0     * QUEtiapine 25 MG tablet  Commonly known as:  SEROquel  This may have changed:  Another medication with the same name was added. Make sure you understand how and when to take each.      Dose:  12.5 mg  Take 0.5 tablets (12.5 mg) by mouth 2 times daily as needed (agiatation, delirium)  Quantity:  30 tablet  Refills:  0         * This list has 2 medication(s) that are the same as other medications prescribed for you. Read the directions carefully, and ask your doctor or other care provider to review them with you.            CONTINUE these medicines which have NOT CHANGED      Dose / Directions   alendronate 35 MG tablet  Commonly known as:  FOSAMAX      Dose:  35 mg  Take 35 mg by mouth every 7 days  Refills:  0     budesonide-formoterol 160-4.5 MCG/ACT Inhaler  Commonly known as:  SYMBICORT      Dose:  2 puff  Inhale 2 puffs into the lungs 2 times daily  Refills:  0     diclofenac 1 % topical gel  Commonly known as:  VOLTAREN      Dose:  4 g  Place 4 g onto the skin 4 times daily as needed for moderate pain  Refills:  0     estradiol 0.1 MG/GM vaginal cream  Commonly known as:  ESTRACE      Dose:  1 g  Place 1 g vaginally twice a week  Refills:  0     etodolac 300 MG capsule  Commonly known as:  LODINE      Dose:  300 mg  Take 300 mg by mouth 2 times daily as needed  Refills:  0     furosemide 20 MG tablet  Commonly known as:  LASIX      Dose:  20 mg  Take 20 mg by mouth daily  Refills:  0     * Combivent Respimat  MCG/ACT inhaler  Generic drug:  Ipratropium-Albuterol      Dose:  1 puff  Inhale 1 puff into the lungs 4 times daily  Refills:  0     * ipratropium - albuterol 0.5 mg/2.5 mg/3 mL 0.5-2.5 (3) MG/3ML neb solution  Commonly  known as:  DUONEB      Dose:  1 ampule  Take 1 ampule by nebulization every 6 hours as needed.  Refills:  0     naltrexone 50 MG tablet  Commonly known as:  DEPADE/REVIA      Dose:  50 mg  Take 50 mg by mouth 2 times daily  Refills:  0     omeprazole 20 MG DR capsule  Commonly known as:  priLOSEC      Dose:  20 mg  Take 20 mg by mouth 2 times daily  Refills:  0         * This list has 2 medication(s) that are the same as other medications prescribed for you. Read the directions carefully, and ask your doctor or other care provider to review them with you.            STOP taking    cyclobenzaprine 5 MG tablet  Commonly known as:  FLEXERIL        varenicline 0.5 MG X 11 & 1 MG X 42 tablet  Commonly known as:  CHANTIX JUNITO              Where to get your medicines      These medications were sent to [id number] - [place], [place] - [address]  [address] [facility] [address]    Phone:  [phone]     buPROPion 150 MG 24 hr tablet    cephALEXin 500 MG capsule    QUEtiapine 25 MG tablet         We also reviewed Deidre Braga's allergy list and updated it as needed:  Allergies   Allergen Reactions     Sulfa Drugs Anaphylaxis     Baclofen Swelling     Gabapentin Swelling     Naproxen Swelling     Tramadol Swelling       Thank you for continuing to care for Deidre Braga.  We look forward to working together with you in the future.    Sincerely,  Manny Chambers [facility]

## 2019-12-23 NOTE — PROGRESS NOTES
Incentive Spirometry education completed.  Pt goal 1650 mls.  Pt achieved 1000 mls.  Pt instructed to perform 10/hr while awake with at least one deep breath and cough per hour until able to perform baseline activity.  RT will follow and re-assess as need.      Joy Oconnor, RT on 12/23/2019 at 2:12 PM

## 2019-12-23 NOTE — PLAN OF CARE
Discharge Planner PT   Patient plan for discharge: short term rehab  Current status: CGA for safety with transfers and ambulation  Barriers to return to prior living situation: episodes of decreased dynamic stability  Recommendations for discharge: continued PT  Rationale for recommendations: to promote return to safe, independent mobility.       Entered by: Scar Reid 12/23/2019 3:11 PM

## 2019-12-23 NOTE — PHARMACY
Pharmacy - Transfer Medication Reconciliation     The patient's transfer medication orders have been compared to the medication administration record and to the Prior to Admissions Medications list - any noted discrepancies were resolved with the MD.     Thank you. Pharmacy will continue to monitor.     Manny Chambers RPH ....................  12/23/2019   12:19 PM

## 2019-12-24 ENCOUNTER — APPOINTMENT (OUTPATIENT)
Dept: PHYSICAL THERAPY | Facility: OTHER | Age: 59
DRG: 917 | End: 2019-12-24
Payer: MEDICARE

## 2019-12-24 VITALS
RESPIRATION RATE: 18 BRPM | TEMPERATURE: 96.8 F | OXYGEN SATURATION: 97 % | DIASTOLIC BLOOD PRESSURE: 85 MMHG | SYSTOLIC BLOOD PRESSURE: 153 MMHG | HEART RATE: 100 BPM | BODY MASS INDEX: 28.15 KG/M2 | WEIGHT: 164.9 LBS | HEIGHT: 64 IN

## 2019-12-24 LAB
BACTERIA SPEC CULT: NORMAL
POTASSIUM SERPL-SCNC: 3.7 MMOL/L (ref 3.5–5.1)
SPECIMEN SOURCE: NORMAL

## 2019-12-24 PROCEDURE — 93010 ELECTROCARDIOGRAM REPORT: CPT | Performed by: INTERNAL MEDICINE

## 2019-12-24 PROCEDURE — 97116 GAIT TRAINING THERAPY: CPT | Mod: GP

## 2019-12-24 PROCEDURE — 97530 THERAPEUTIC ACTIVITIES: CPT | Mod: GO | Performed by: OCCUPATIONAL THERAPIST

## 2019-12-24 PROCEDURE — 97530 THERAPEUTIC ACTIVITIES: CPT | Mod: GP

## 2019-12-24 PROCEDURE — 36415 COLL VENOUS BLD VENIPUNCTURE: CPT | Performed by: FAMILY MEDICINE

## 2019-12-24 PROCEDURE — 25000132 ZZH RX MED GY IP 250 OP 250 PS 637: Mod: GY | Performed by: FAMILY MEDICINE

## 2019-12-24 PROCEDURE — 93005 ELECTROCARDIOGRAM TRACING: CPT

## 2019-12-24 PROCEDURE — 94640 AIRWAY INHALATION TREATMENT: CPT

## 2019-12-24 PROCEDURE — 84132 ASSAY OF SERUM POTASSIUM: CPT | Performed by: FAMILY MEDICINE

## 2019-12-24 PROCEDURE — 99239 HOSP IP/OBS DSCHRG MGMT >30: CPT | Performed by: FAMILY MEDICINE

## 2019-12-24 PROCEDURE — 97535 SELF CARE MNGMENT TRAINING: CPT | Mod: GO | Performed by: OCCUPATIONAL THERAPIST

## 2019-12-24 PROCEDURE — 25000125 ZZHC RX 250: Performed by: FAMILY MEDICINE

## 2019-12-24 PROCEDURE — 40000275 ZZH STATISTIC RCP TIME EA 10 MIN

## 2019-12-24 RX ORDER — PREGABALIN 50 MG/1
50 CAPSULE ORAL 2 TIMES DAILY
Qty: 60 CAPSULE | Refills: 0 | Status: SHIPPED | OUTPATIENT
Start: 2019-12-24

## 2019-12-24 RX ADMIN — CEPHALEXIN 500 MG: 250 CAPSULE ORAL at 08:33

## 2019-12-24 RX ADMIN — DULOXETINE HYDROCHLORIDE 30 MG: 30 CAPSULE, DELAYED RELEASE ORAL at 08:32

## 2019-12-24 RX ADMIN — PANTOPRAZOLE SODIUM 40 MG: 40 TABLET, DELAYED RELEASE ORAL at 08:32

## 2019-12-24 RX ADMIN — ACETAMINOPHEN 650 MG: 325 TABLET, FILM COATED ORAL at 08:30

## 2019-12-24 RX ADMIN — BUDESONIDE 0.5 MG: 0.5 INHALANT RESPIRATORY (INHALATION) at 07:33

## 2019-12-24 RX ADMIN — PREGABALIN 50 MG: 25 CAPSULE ORAL at 08:30

## 2019-12-24 RX ADMIN — BUPROPION 150 MG: 150 TABLET, EXTENDED RELEASE ORAL at 08:32

## 2019-12-24 ASSESSMENT — ACTIVITIES OF DAILY LIVING (ADL)
ADLS_ACUITY_SCORE: 16

## 2019-12-24 NOTE — PROGRESS NOTES
Attempted to call emanuel for nurse to nurse. Unable to reach a nurse at this time.     Judit Quach RN on 12/24/2019 at 9:37 AM

## 2019-12-24 NOTE — PLAN OF CARE
One of pt knee staple falling out, minimal drainage.  Will re-wrap dressing and continue to monitor.   Holly Monroy RN.............................12/24/2019 2:57 AM

## 2019-12-24 NOTE — PLAN OF CARE
"/74 (BP Location: Right arm)   Pulse 100   Temp 98.7  F (37.1  C) (Tympanic)   Resp 16   Ht 1.626 m (5' 4\")   Wt 74.8 kg (164 lb 14.5 oz)   SpO2 96%   Breastfeeding No   BMI 28.31 kg/m      Pt A&O, slightly forgetful.  Pt complains of right ear pain and tongue pain when drinking water.  Redness observed on tongue.  Warm pack applied to right ear with adequate decrease in pain.  Pt resting comfortably, will continue to monitor.  Holly Monroy RN.............................12/23/2019 11:41 PM    "

## 2019-12-24 NOTE — PROGRESS NOTES
Nurse to nurse report completed with KERRI De La Cruz at [facility]. All questions answered at this time.     Judit Quach RN on 12/24/2019 at 9:46 AM

## 2019-12-24 NOTE — PROGRESS NOTES
"VSS BP (!) 158/87 (BP Location: Right arm)   Pulse 98   Temp 99.4  F (37.4  C) (Tympanic)   Resp 16   Ht 1.626 m (5' 4\")   Wt 74.8 kg (164 lb 14.5 oz)   SpO2 99%   Breastfeeding No   BMI 28.31 kg/m    PRN tylenol given for ear ache. Adequate output. Pain on urination, blood tinged urine. Will continue to monitor.     Kassi Campos RN on 12/23/2019 at 6:32 PM    "

## 2019-12-24 NOTE — PLAN OF CARE
Physical Therapy Discharge Summary    Reason for therapy discharge:    Discharged to transitional care facility.    Progress towards therapy goal(s). See goals on Care Plan in [facility] electronic health record for goal details.  Goals partially met.  Barriers to achieving goals:   discharge from facility.    Therapy recommendation(s):    Continued therapy is recommended.  Rationale/Recommendations:  to promote strength, stability and return to functional activity/gait tolerance allowing her to return home.

## 2019-12-24 NOTE — PROGRESS NOTES
NSG DISCHARGE NOTE    Patient discharged to [facility] at 9:29 AM via wheel chair. Accompanied by other: Care Cab and staff. Discharge instructions reviewed with patient, opportunity offered to ask questions. Prescriptions sent to patients preferred pharmacy. All belongings sent with patient.    Care cab instructed to give home medications directly to nursing staff.    Judit Quach RN

## 2019-12-24 NOTE — PLAN OF CARE
Occupational Therapy Discharge Summary    Reason for therapy discharge:    Discharged to transitional care facility.    Progress towards therapy goal(s). See goals on Care Plan in [facility] electronic health record for goal details.  Goals partially met.  Barriers to achieving goals:   discharge from facility.    Therapy recommendation(s):    Continued therapy is recommended.  Rationale/Recommendations:  on going PT/OT for strengthening  prior to returning to [facility] .

## 2019-12-26 ENCOUNTER — PATIENT OUTREACH (OUTPATIENT)
Dept: CARE COORDINATION | Facility: CLINIC | Age: 59
End: 2019-12-26

## 2019-12-26 LAB
BACTERIA SPEC CULT: NORMAL
BACTERIA SPEC CULT: NORMAL
SPECIMEN SOURCE: NORMAL
SPECIMEN SOURCE: NORMAL

## 2019-12-26 NOTE — PROGRESS NOTES
Clinic Care Coordination Contact  Care Team Conversations    The RN CC did a chart review.  The patient was sent to the RN CC due to the primary care still listed as Anne Marie Hurst who saw the patient last in 2012.  The patient has been using the [facility] system and [facility].  The RN CC will forward the case to the DVTel  for the decision to be made on following the patient.  Since the listed PCP has not seen the patient a change to the current PCP would be preferred.      Rusty Silva MSN, RN, PHN, [facility]   Primary Care Clinical RN Care Coordinator  [facility]  12/26/2019   3:39 PM  [email or web address]  Office: [phone]

## 2019-12-29 ENCOUNTER — HOSPITAL ENCOUNTER (EMERGENCY)
Facility: OTHER | Age: 59
Discharge: HOME | End: 2019-12-29
Attending: PHYSICIAN ASSISTANT | Admitting: PHYSICIAN ASSISTANT
Payer: MEDICARE

## 2019-12-29 VITALS
BODY MASS INDEX: 24.72 KG/M2 | RESPIRATION RATE: 20 BRPM | OXYGEN SATURATION: 95 % | WEIGHT: 144 LBS | SYSTOLIC BLOOD PRESSURE: 121 MMHG | TEMPERATURE: 97.8 F | DIASTOLIC BLOOD PRESSURE: 86 MMHG | HEART RATE: 85 BPM

## 2019-12-29 DIAGNOSIS — G89.29 CHRONIC LEG PAIN: ICD-10-CM

## 2019-12-29 DIAGNOSIS — M79.606 CHRONIC LEG PAIN: ICD-10-CM

## 2019-12-29 LAB
ALBUMIN UR-MCNC: NEGATIVE MG/DL
APPEARANCE UR: CLEAR
BASOPHILS # BLD AUTO: 0 10E9/L (ref 0–0.2)
BASOPHILS NFR BLD AUTO: 0.1 %
BILIRUB UR QL STRIP: ABNORMAL
COLOR UR AUTO: YELLOW
CRP SERPL-MCNC: 1.1 MG/L
DIFFERENTIAL METHOD BLD: ABNORMAL
EOSINOPHIL # BLD AUTO: 0.2 10E9/L (ref 0–0.7)
EOSINOPHIL NFR BLD AUTO: 2.4 %
ERYTHROCYTE [DISTWIDTH] IN BLOOD BY AUTOMATED COUNT: 14.6 % (ref 10–15)
GLUCOSE UR STRIP-MCNC: NEGATIVE MG/DL
HCT VFR BLD AUTO: 29.6 % (ref 35–47)
HGB BLD-MCNC: 9.4 G/DL (ref 11.7–15.7)
HGB UR QL STRIP: NEGATIVE
IMM GRANULOCYTES # BLD: 0.1 10E9/L (ref 0–0.4)
IMM GRANULOCYTES NFR BLD: 1 %
KETONES UR STRIP-MCNC: NEGATIVE MG/DL
LEUKOCYTE ESTERASE UR QL STRIP: NEGATIVE
LYMPHOCYTES # BLD AUTO: 1.8 10E9/L (ref 0.8–5.3)
LYMPHOCYTES NFR BLD AUTO: 25.5 %
MCH RBC QN AUTO: 28.7 PG (ref 26.5–33)
MCHC RBC AUTO-ENTMCNC: 31.8 G/DL (ref 31.5–36.5)
MCV RBC AUTO: 91 FL (ref 78–100)
MONOCYTES # BLD AUTO: 0.8 10E9/L (ref 0–1.3)
MONOCYTES NFR BLD AUTO: 11 %
NEUTROPHILS # BLD AUTO: 4.3 10E9/L (ref 1.6–8.3)
NEUTROPHILS NFR BLD AUTO: 60 %
NITRATE UR QL: NEGATIVE
PH UR STRIP: 5.5 PH (ref 5–9)
PLATELET # BLD AUTO: 266 10E9/L (ref 150–450)
RBC # BLD AUTO: 3.27 10E12/L (ref 3.8–5.2)
RBC #/AREA URNS AUTO: NORMAL /HPF
SOURCE: ABNORMAL
SP GR UR STRIP: 1.01 (ref 1–1.03)
UROBILINOGEN UR STRIP-ACNC: 0.2 EU/DL (ref 0.2–1)
WBC # BLD AUTO: 7.1 10E9/L (ref 4–11)
WBC #/AREA URNS AUTO: NORMAL /HPF

## 2019-12-29 PROCEDURE — 36415 COLL VENOUS BLD VENIPUNCTURE: CPT | Performed by: PHYSICIAN ASSISTANT

## 2019-12-29 PROCEDURE — 96374 THER/PROPH/DIAG INJ IV PUSH: CPT | Performed by: PHYSICIAN ASSISTANT

## 2019-12-29 PROCEDURE — 99284 EMERGENCY DEPT VISIT MOD MDM: CPT | Mod: Z6 | Performed by: PHYSICIAN ASSISTANT

## 2019-12-29 PROCEDURE — 86140 C-REACTIVE PROTEIN: CPT | Performed by: PHYSICIAN ASSISTANT

## 2019-12-29 PROCEDURE — 81001 URINALYSIS AUTO W/SCOPE: CPT | Performed by: PHYSICIAN ASSISTANT

## 2019-12-29 PROCEDURE — 99284 EMERGENCY DEPT VISIT MOD MDM: CPT | Mod: 25 | Performed by: PHYSICIAN ASSISTANT

## 2019-12-29 PROCEDURE — 85025 COMPLETE CBC W/AUTO DIFF WBC: CPT | Performed by: PHYSICIAN ASSISTANT

## 2019-12-29 PROCEDURE — 25000125 ZZHC RX 250: Performed by: PHYSICIAN ASSISTANT

## 2019-12-29 RX ORDER — KETAMINE HCL IN 0.9 % NACL 50 MG/5 ML
0.2 SYRINGE (ML) INTRAVENOUS ONCE
Status: COMPLETED | OUTPATIENT
Start: 2019-12-29 | End: 2019-12-29

## 2019-12-29 RX ADMIN — Medication 12.5 MG: at 13:13

## 2019-12-29 ASSESSMENT — ENCOUNTER SYMPTOMS
ADENOPATHY: 0
BACK PAIN: 0
ABDOMINAL PAIN: 0
CONFUSION: 0
CHILLS: 0
FEVER: 0
CHEST TIGHTNESS: 0
SHORTNESS OF BREATH: 0
WOUND: 0
HEMATURIA: 0
BRUISES/BLEEDS EASILY: 0

## 2019-12-29 NOTE — DISCHARGE INSTRUCTIONS
Get plenty of fluids and rest.  As we discussed her does not appear to be an emergent process that is occurring today.  This appears to be more of a flare of your chronic bilateral leg pain.  A referral is placed for you to follow-up with Dr. Harman tomorrow in the clinic, however, I recommend that you give the clinic a call as well first thing in the morning to help facilitate that appointment.  Return to the ED if there are worsening or concerning symptoms.

## 2019-12-29 NOTE — ED AVS SNAPSHOT
[facility]  [address]  [place] [address]  Phone:  [phone]  Fax:  [phone]                                    Deidre Braga   MRN: [id number]    Department:  [facility]   Date of Visit:  12/29/2019           After Visit Summary Signature Page    I have received my discharge instructions, and my questions have been answered. I have discussed any challenges I see with this plan with the nurse or doctor.    ..........................................................................................................................................  Patient/Patient Representative Signature      ..........................................................................................................................................  Patient Representative Print Name and Relationship to Patient    ..................................................               ................................................  Date                                   Time    ..........................................................................................................................................  Reviewed by Signature/Title    ...................................................              ..............................................  Date                                               Time          22EPIC Rev 08/18

## 2019-12-29 NOTE — ED TRIAGE NOTES
Patient states she is having lower back pain which radiates down her legs. Patient states she is going to see orthopedics next week. Patient rates pain 10/10.
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
## 2019-12-30 ENCOUNTER — HOSPITAL ENCOUNTER (EMERGENCY)
Facility: OTHER | Age: 59
Discharge: HOME | End: 2019-12-30
Attending: FAMILY MEDICINE | Admitting: FAMILY MEDICINE
Payer: MEDICARE

## 2019-12-30 VITALS
HEIGHT: 64 IN | WEIGHT: 135 LBS | BODY MASS INDEX: 23.05 KG/M2 | TEMPERATURE: 98.8 F | RESPIRATION RATE: 16 BRPM | SYSTOLIC BLOOD PRESSURE: 113 MMHG | DIASTOLIC BLOOD PRESSURE: 68 MMHG | OXYGEN SATURATION: 96 %

## 2019-12-30 DIAGNOSIS — G89.29 CHRONIC MIDLINE LOW BACK PAIN WITH LEFT-SIDED SCIATICA: ICD-10-CM

## 2019-12-30 DIAGNOSIS — M54.42 CHRONIC MIDLINE LOW BACK PAIN WITH LEFT-SIDED SCIATICA: ICD-10-CM

## 2019-12-30 PROCEDURE — 25000132 ZZH RX MED GY IP 250 OP 250 PS 637: Mod: GY | Performed by: FAMILY MEDICINE

## 2019-12-30 PROCEDURE — 99283 EMERGENCY DEPT VISIT LOW MDM: CPT | Performed by: FAMILY MEDICINE

## 2019-12-30 PROCEDURE — 99283 EMERGENCY DEPT VISIT LOW MDM: CPT | Mod: Z6 | Performed by: FAMILY MEDICINE

## 2019-12-30 RX ORDER — ACETAMINOPHEN 325 MG/1
975 TABLET ORAL ONCE
Status: COMPLETED | OUTPATIENT
Start: 2019-12-30 | End: 2019-12-30

## 2019-12-30 RX ORDER — METHYLPREDNISOLONE 4 MG
TABLET, DOSE PACK ORAL
Qty: 21 TABLET | Refills: 0 | Status: SHIPPED | OUTPATIENT
Start: 2019-12-30 | End: 2020-01-01

## 2019-12-30 RX ADMIN — ACETAMINOPHEN 975 MG: 500 TABLET, FILM COATED ORAL at 16:43

## 2019-12-30 ASSESSMENT — ENCOUNTER SYMPTOMS
DYSURIA: 0
DIAPHORESIS: 0
ABDOMINAL PAIN: 0
DYSPHORIC MOOD: 1
FATIGUE: 1
GASTROINTESTINAL NEGATIVE: 1
COUGH: 1
WOUND: 1
EYES NEGATIVE: 1
AGITATION: 1
SHORTNESS OF BREATH: 1
CHILLS: 0
NUMBNESS: 0
FEVER: 0
WEAKNESS: 0
BACK PAIN: 1

## 2019-12-30 ASSESSMENT — MIFFLIN-ST. JEOR: SCORE: 1167.36

## 2019-12-30 NOTE — ED TRIAGE NOTES
"Pt comes into ER today from home. Pt reports she is coming into the ER for similar reasons as yesterday: back pain, \"pain everywhere\".   "

## 2019-12-30 NOTE — ED NOTES
Right knee cleansed and steri strips applied and staples removed per verbal order from Dr. Munoz. Wound edges not approximated well. Entire knee cap area is erythemic and tender to touch. Patient is constantly moving and squirming in the bed unable to sit still, complaining of back pain.

## 2019-12-30 NOTE — ED AVS SNAPSHOT
[facility]  [address]  [place] [address]  Phone:  [phone]  Fax:  [phone]                                    Deidre Braga   MRN: [id number]    Department:  [facility]   Date of Visit:  12/30/2019           After Visit Summary Signature Page    I have received my discharge instructions, and my questions have been answered. I have discussed any challenges I see with this plan with the nurse or doctor.    ..........................................................................................................................................  Patient/Patient Representative Signature      ..........................................................................................................................................  Patient Representative Print Name and Relationship to Patient    ..................................................               ................................................  Date                                   Time    ..........................................................................................................................................  Reviewed by Signature/Title    ...................................................              ..............................................  Date                                               Time          22EPIC Rev 08/18
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
## 2019-12-30 NOTE — DISCHARGE INSTRUCTIONS
Dear Ms. Braga,  It was nice to see you.  As we talked, there is no pill that will take away your pain; but you can get pain relief/improvement with continue activity as tolerated.  Use Ice for pain and Heat for stiffness.    For 6 days you will stop Lodine and use Medrol Dose pack for your pain.    Drink plenty of water: 6-8 glasses per day.    Follow up with Dr. Do.    Dr. Anthony Munoz
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
## 2020-01-01 ENCOUNTER — HOSPITAL ENCOUNTER (OUTPATIENT)
Dept: GENERAL RADIOLOGY | Facility: OTHER | Age: 60
Discharge: HOME | End: 2020-08-25
Attending: FAMILY MEDICINE | Admitting: FAMILY MEDICINE
Payer: MEDICARE

## 2020-01-01 ENCOUNTER — ANESTHESIA EVENT (OUTPATIENT)
Dept: EMERGENCY MEDICINE | Facility: OTHER | Age: 60
End: 2020-01-01
Payer: MEDICARE

## 2020-01-01 ENCOUNTER — HOSPITAL ENCOUNTER (OUTPATIENT)
Dept: PHYSICAL THERAPY | Facility: OTHER | Age: 60
Setting detail: THERAPIES SERIES
End: 2020-03-12
Attending: NEUROLOGICAL SURGERY
Payer: MEDICARE

## 2020-01-01 ENCOUNTER — TELEPHONE (OUTPATIENT)
Dept: CARE COORDINATION | Facility: OTHER | Age: 60
End: 2020-01-01

## 2020-01-01 ENCOUNTER — HOSPITAL ENCOUNTER (OUTPATIENT)
Dept: PHYSICAL THERAPY | Facility: OTHER | Age: 60
Setting detail: THERAPIES SERIES
End: 2020-02-10
Attending: NEUROLOGICAL SURGERY
Payer: MEDICARE

## 2020-01-01 ENCOUNTER — APPOINTMENT (OUTPATIENT)
Dept: GENERAL RADIOLOGY | Facility: OTHER | Age: 60
End: 2020-01-01
Attending: PHYSICIAN ASSISTANT
Payer: MEDICARE

## 2020-01-01 ENCOUNTER — NURSE TRIAGE (OUTPATIENT)
Dept: NURSING | Facility: CLINIC | Age: 60
End: 2020-01-01

## 2020-01-01 ENCOUNTER — HOSPITAL ENCOUNTER (OUTPATIENT)
Dept: PHYSICAL THERAPY | Facility: OTHER | Age: 60
Setting detail: THERAPIES SERIES
End: 2020-02-21
Attending: NEUROLOGICAL SURGERY
Payer: MEDICARE

## 2020-01-01 ENCOUNTER — HOSPITAL ENCOUNTER (OUTPATIENT)
Dept: PHYSICAL THERAPY | Facility: OTHER | Age: 60
Setting detail: THERAPIES SERIES
End: 2020-03-05
Attending: NEUROLOGICAL SURGERY
Payer: MEDICARE

## 2020-01-01 ENCOUNTER — HOSPITAL ENCOUNTER (OUTPATIENT)
Dept: PHYSICAL THERAPY | Facility: OTHER | Age: 60
Setting detail: THERAPIES SERIES
End: 2020-03-03
Attending: NEUROLOGICAL SURGERY
Payer: MEDICARE

## 2020-01-01 ENCOUNTER — HOSPITAL ENCOUNTER (OUTPATIENT)
Dept: GENERAL RADIOLOGY | Facility: OTHER | Age: 60
Discharge: HOME | End: 2020-02-26
Attending: NURSE PRACTITIONER | Admitting: FAMILY MEDICINE
Payer: MEDICARE

## 2020-01-01 ENCOUNTER — APPOINTMENT (OUTPATIENT)
Dept: CT IMAGING | Facility: OTHER | Age: 60
End: 2020-01-01
Attending: PHYSICIAN ASSISTANT
Payer: MEDICARE

## 2020-01-01 ENCOUNTER — HOSPITAL ENCOUNTER (OUTPATIENT)
Dept: GENERAL RADIOLOGY | Facility: OTHER | Age: 60
Discharge: HOME | End: 2020-11-17
Attending: FAMILY MEDICINE | Admitting: FAMILY MEDICINE
Payer: MEDICARE

## 2020-01-01 ENCOUNTER — OFFICE VISIT (OUTPATIENT)
Dept: SURGERY | Facility: OTHER | Age: 60
End: 2020-01-01
Attending: PHYSICIAN ASSISTANT
Payer: MEDICARE

## 2020-01-01 ENCOUNTER — HOSPITAL ENCOUNTER (OUTPATIENT)
Dept: PHYSICAL THERAPY | Facility: OTHER | Age: 60
Setting detail: THERAPIES SERIES
End: 2020-02-05
Attending: NEUROLOGICAL SURGERY
Payer: MEDICARE

## 2020-01-01 ENCOUNTER — ALLIED HEALTH/NURSE VISIT (OUTPATIENT)
Dept: FAMILY MEDICINE | Facility: OTHER | Age: 60
End: 2020-01-01
Attending: FAMILY MEDICINE
Payer: MEDICARE

## 2020-01-01 ENCOUNTER — HOSPITAL ENCOUNTER (OUTPATIENT)
Facility: OTHER | Age: 60
Setting detail: OBSERVATION
Discharge: HOME | End: 2020-06-13
Attending: PHYSICIAN ASSISTANT | Admitting: INTERNAL MEDICINE
Payer: MEDICARE

## 2020-01-01 ENCOUNTER — HOSPITAL ENCOUNTER (OUTPATIENT)
Dept: PHYSICAL THERAPY | Facility: OTHER | Age: 60
Setting detail: THERAPIES SERIES
End: 2020-03-09
Attending: NEUROLOGICAL SURGERY
Payer: MEDICARE

## 2020-01-01 ENCOUNTER — ANESTHESIA (OUTPATIENT)
Dept: EMERGENCY MEDICINE | Facility: OTHER | Age: 60
End: 2020-01-01
Payer: MEDICARE

## 2020-01-01 ENCOUNTER — HOSPITAL ENCOUNTER (OUTPATIENT)
Dept: PHYSICAL THERAPY | Facility: OTHER | Age: 60
Setting detail: THERAPIES SERIES
End: 2020-02-12
Attending: NEUROLOGICAL SURGERY
Payer: MEDICARE

## 2020-01-01 ENCOUNTER — HOSPITAL ENCOUNTER (EMERGENCY)
Facility: OTHER | Age: 60
Discharge: OTHER INSTITUTION | End: 2020-07-30
Attending: FAMILY MEDICINE | Admitting: FAMILY MEDICINE
Payer: MEDICARE

## 2020-01-01 ENCOUNTER — HOSPITAL ENCOUNTER (EMERGENCY)
Facility: OTHER | Age: 60
Discharge: OTHER INSTITUTION | End: 2020-10-22
Attending: PHYSICIAN ASSISTANT | Admitting: PHYSICIAN ASSISTANT
Payer: MEDICARE

## 2020-01-01 ENCOUNTER — MEDICAL CORRESPONDENCE (OUTPATIENT)
Dept: HEALTH INFORMATION MANAGEMENT | Facility: OTHER | Age: 60
End: 2020-01-01

## 2020-01-01 ENCOUNTER — CARE COORDINATION (OUTPATIENT)
Dept: CARE COORDINATION | Facility: OTHER | Age: 60
End: 2020-01-01

## 2020-01-01 ENCOUNTER — HEALTH MAINTENANCE LETTER (OUTPATIENT)
Age: 60
End: 2020-01-01

## 2020-01-01 ENCOUNTER — HOSPITAL ENCOUNTER (EMERGENCY)
Facility: OTHER | Age: 60
Discharge: HOME | End: 2020-08-19
Attending: PHYSICIAN ASSISTANT | Admitting: PHYSICIAN ASSISTANT
Payer: MEDICARE

## 2020-01-01 ENCOUNTER — TRANSFERRED RECORDS (OUTPATIENT)
Dept: HEALTH INFORMATION MANAGEMENT | Facility: OTHER | Age: 60
End: 2020-01-01

## 2020-01-01 ENCOUNTER — HOSPITAL ENCOUNTER (OUTPATIENT)
Dept: GENERAL RADIOLOGY | Facility: OTHER | Age: 60
Discharge: HOME | End: 2020-05-18
Attending: FAMILY MEDICINE | Admitting: FAMILY MEDICINE
Payer: MEDICARE

## 2020-01-01 ENCOUNTER — HOSPITAL ENCOUNTER (OUTPATIENT)
Dept: GENERAL RADIOLOGY | Facility: OTHER | Age: 60
Discharge: HOME | End: 2020-05-05
Attending: FAMILY MEDICINE | Admitting: FAMILY MEDICINE
Payer: MEDICARE

## 2020-01-01 VITALS
TEMPERATURE: 97 F | OXYGEN SATURATION: 92 % | BODY MASS INDEX: 25.19 KG/M2 | WEIGHT: 142.2 LBS | SYSTOLIC BLOOD PRESSURE: 157 MMHG | HEART RATE: 111 BPM | RESPIRATION RATE: 13 BRPM | DIASTOLIC BLOOD PRESSURE: 98 MMHG

## 2020-01-01 VITALS
DIASTOLIC BLOOD PRESSURE: 59 MMHG | RESPIRATION RATE: 16 BRPM | HEART RATE: 80 BPM | SYSTOLIC BLOOD PRESSURE: 99 MMHG | TEMPERATURE: 97 F | OXYGEN SATURATION: 97 % | WEIGHT: 147 LBS | BODY MASS INDEX: 26.05 KG/M2 | HEIGHT: 63 IN

## 2020-01-01 VITALS
HEART RATE: 92 BPM | WEIGHT: 143.4 LBS | RESPIRATION RATE: 16 BRPM | DIASTOLIC BLOOD PRESSURE: 68 MMHG | BODY MASS INDEX: 25.4 KG/M2 | SYSTOLIC BLOOD PRESSURE: 142 MMHG | TEMPERATURE: 96.7 F

## 2020-01-01 VITALS
OXYGEN SATURATION: 97 % | BODY MASS INDEX: 24.43 KG/M2 | HEART RATE: 93 BPM | RESPIRATION RATE: 27 BRPM | SYSTOLIC BLOOD PRESSURE: 145 MMHG | DIASTOLIC BLOOD PRESSURE: 94 MMHG | TEMPERATURE: 97 F | HEIGHT: 63 IN | WEIGHT: 137.9 LBS

## 2020-01-01 VITALS
HEIGHT: 62 IN | WEIGHT: 140 LBS | HEART RATE: 82 BPM | RESPIRATION RATE: 20 BRPM | SYSTOLIC BLOOD PRESSURE: 118 MMHG | OXYGEN SATURATION: 92 % | TEMPERATURE: 98 F | DIASTOLIC BLOOD PRESSURE: 70 MMHG | BODY MASS INDEX: 25.76 KG/M2

## 2020-01-01 VITALS
BODY MASS INDEX: 23.28 KG/M2 | DIASTOLIC BLOOD PRESSURE: 82 MMHG | HEART RATE: 114 BPM | OXYGEN SATURATION: 94 % | WEIGHT: 126.5 LBS | TEMPERATURE: 96.6 F | SYSTOLIC BLOOD PRESSURE: 120 MMHG | RESPIRATION RATE: 18 BRPM | HEIGHT: 62 IN

## 2020-01-01 DIAGNOSIS — E87.6 HYPOKALEMIA: ICD-10-CM

## 2020-01-01 DIAGNOSIS — L03.116 CELLULITIS OF LEFT LOWER EXTREMITY: Primary | ICD-10-CM

## 2020-01-01 DIAGNOSIS — M06.9 RHEUMATOID ARTHRITIS, INVOLVING UNSPECIFIED SITE, UNSPECIFIED RHEUMATOID FACTOR PRESENCE: ICD-10-CM

## 2020-01-01 DIAGNOSIS — L03.116 CELLULITIS AND ABSCESS OF LEFT LEG: ICD-10-CM

## 2020-01-01 DIAGNOSIS — G89.29 CHRONIC LOW BACK PAIN WITH BILATERAL SCIATICA: ICD-10-CM

## 2020-01-01 DIAGNOSIS — L03.116 CELLULITIS OF LEFT LOWER LIMB: ICD-10-CM

## 2020-01-01 DIAGNOSIS — M54.41 CHRONIC BILATERAL LOW BACK PAIN WITH BILATERAL SCIATICA: ICD-10-CM

## 2020-01-01 DIAGNOSIS — L02.416 CELLULITIS AND ABSCESS OF LEFT LEG: ICD-10-CM

## 2020-01-01 DIAGNOSIS — F17.210 CIGARETTE SMOKER: ICD-10-CM

## 2020-01-01 DIAGNOSIS — M79.7 FIBROMYALGIA: Primary | ICD-10-CM

## 2020-01-01 DIAGNOSIS — Z79.51 LONG TERM CURRENT USE OF INHALED STEROID: ICD-10-CM

## 2020-01-01 DIAGNOSIS — M54.42 CHRONIC BILATERAL LOW BACK PAIN WITH BILATERAL SCIATICA: ICD-10-CM

## 2020-01-01 DIAGNOSIS — M51.369 DEGENERATION OF LUMBAR INTERVERTEBRAL DISC: ICD-10-CM

## 2020-01-01 DIAGNOSIS — G89.29 CHRONIC BILATERAL LOW BACK PAIN WITH BILATERAL SCIATICA: ICD-10-CM

## 2020-01-01 DIAGNOSIS — M54.16 LUMBAR RADICULOPATHY: ICD-10-CM

## 2020-01-01 DIAGNOSIS — Z79.899 ENCOUNTER FOR LONG-TERM (CURRENT) USE OF OTHER MEDICATIONS: ICD-10-CM

## 2020-01-01 DIAGNOSIS — M46.1 SACROILIITIS (H): ICD-10-CM

## 2020-01-01 DIAGNOSIS — E83.42 HYPOMAGNESEMIA: ICD-10-CM

## 2020-01-01 DIAGNOSIS — L03.116 CELLULITIS AND ABSCESS OF LEFT LEG: Primary | ICD-10-CM

## 2020-01-01 DIAGNOSIS — G89.29 CHRONIC PAIN: ICD-10-CM

## 2020-01-01 DIAGNOSIS — L02.416 CELLULITIS AND ABSCESS OF LEFT LEG: Primary | ICD-10-CM

## 2020-01-01 DIAGNOSIS — F10.10 CHRONIC ALCOHOL ABUSE: ICD-10-CM

## 2020-01-01 DIAGNOSIS — M54.41 CHRONIC LOW BACK PAIN WITH BILATERAL SCIATICA: ICD-10-CM

## 2020-01-01 DIAGNOSIS — Z86.14 PERSONAL HISTORY OF METHICILLIN RESISTANT STAPHYLOCOCCUS AUREUS: ICD-10-CM

## 2020-01-01 DIAGNOSIS — J44.89 OBSTRUCTIVE CHRONIC BRONCHITIS WITHOUT EXACERBATION (H): ICD-10-CM

## 2020-01-01 DIAGNOSIS — F10.920 ALCOHOLIC INTOXICATION WITHOUT COMPLICATION (H): ICD-10-CM

## 2020-01-01 DIAGNOSIS — F10.929 ALCOHOL INTOXICATION (H): ICD-10-CM

## 2020-01-01 DIAGNOSIS — N18.30 CHRONIC KIDNEY DISEASE, STAGE III (MODERATE) (H): ICD-10-CM

## 2020-01-01 DIAGNOSIS — J44.9 CHRONIC OBSTRUCTIVE PULMONARY DISEASE, UNSPECIFIED COPD TYPE (H): ICD-10-CM

## 2020-01-01 DIAGNOSIS — M47.817 LUMBOSACRAL SPONDYLOSIS WITHOUT MYELOPATHY: ICD-10-CM

## 2020-01-01 DIAGNOSIS — M54.42 CHRONIC LOW BACK PAIN WITH BILATERAL SCIATICA: ICD-10-CM

## 2020-01-01 DIAGNOSIS — G89.4 CHRONIC PAIN DISORDER: ICD-10-CM

## 2020-01-01 DIAGNOSIS — Z20.822 ENCOUNTER FOR LABORATORY TESTING FOR COVID-19 VIRUS: Primary | ICD-10-CM

## 2020-01-01 LAB
ALBUMIN SERPL-MCNC: 3.7 G/DL (ref 3.5–5.7)
ALBUMIN SERPL-MCNC: 3.7 G/DL (ref 3.5–5.7)
ALBUMIN SERPL-MCNC: 3.9 G/DL (ref 3.5–5.7)
ALBUMIN UR-MCNC: NEGATIVE MG/DL
ALP SERPL-CCNC: 47 U/L (ref 34–104)
ALP SERPL-CCNC: 47 U/L (ref 34–104)
ALP SERPL-CCNC: 50 U/L (ref 34–104)
ALT SERPL W P-5'-P-CCNC: 22 U/L (ref 7–52)
ALT SERPL W P-5'-P-CCNC: 55 U/L (ref 7–52)
ALT SERPL W P-5'-P-CCNC: 80 U/L (ref 7–52)
AMMONIA PLAS-SCNC: 45 UMOL/L (ref 16–53)
AMPHETAMINES UR QL SCN: NOT DETECTED
ANION GAP SERPL CALCULATED.3IONS-SCNC: 10 MMOL/L (ref 3–14)
ANION GAP SERPL CALCULATED.3IONS-SCNC: 14 MMOL/L (ref 3–14)
ANION GAP SERPL CALCULATED.3IONS-SCNC: 6 MMOL/L (ref 3–14)
ANION GAP SERPL CALCULATED.3IONS-SCNC: 8 MMOL/L (ref 3–14)
ANION GAP SERPL CALCULATED.3IONS-SCNC: 8 MMOL/L (ref 3–14)
APAP SERPL-MCNC: <0.2 UG/ML (ref 0–30)
APAP SERPL-MCNC: <0.2 UG/ML (ref 0–30)
APPEARANCE UR: CLEAR
APTT PPP: 21 SEC (ref 22–37)
APTT PPP: 34 SEC (ref 22–37)
AST SERPL W P-5'-P-CCNC: 26 U/L (ref 13–39)
AST SERPL W P-5'-P-CCNC: 54 U/L (ref 13–39)
AST SERPL W P-5'-P-CCNC: 58 U/L (ref 13–39)
BACTERIA SPEC CULT: NORMAL
BACTERIA SPEC CULT: NORMAL
BARBITURATES UR QL: NOT DETECTED
BASOPHILS # BLD AUTO: 0 10E9/L (ref 0–0.2)
BASOPHILS NFR BLD AUTO: 0.2 %
BASOPHILS NFR BLD AUTO: 0.2 %
BASOPHILS NFR BLD AUTO: 0.4 %
BASOPHILS NFR BLD AUTO: 0.5 %
BENZODIAZ UR QL: NOT DETECTED
BILIRUB SERPL-MCNC: 0.2 MG/DL (ref 0.3–1)
BILIRUB SERPL-MCNC: 0.3 MG/DL (ref 0.3–1)
BILIRUB SERPL-MCNC: 0.4 MG/DL (ref 0.3–1)
BILIRUB UR QL STRIP: NEGATIVE
BUN SERPL-MCNC: 13 MG/DL (ref 7–25)
BUN SERPL-MCNC: 14 MG/DL (ref 7–25)
BUN SERPL-MCNC: 15 MG/DL (ref 7–25)
BUN SERPL-MCNC: 17 MG/DL (ref 7–25)
BUN SERPL-MCNC: 8 MG/DL (ref 7–25)
BUPRENORPHINE UR QL: NOT DETECTED NG/ML
CALCIUM SERPL-MCNC: 8.5 MG/DL (ref 8.6–10.3)
CALCIUM SERPL-MCNC: 8.6 MG/DL (ref 8.6–10.3)
CALCIUM SERPL-MCNC: 8.7 MG/DL (ref 8.6–10.3)
CALCIUM SERPL-MCNC: 8.8 MG/DL (ref 8.6–10.3)
CALCIUM SERPL-MCNC: 9 MG/DL (ref 8.6–10.3)
CANNABINOIDS UR QL: ABNORMAL NG/ML
CHLORIDE SERPL-SCNC: 100 MMOL/L (ref 98–107)
CHLORIDE SERPL-SCNC: 104 MMOL/L (ref 98–107)
CHLORIDE SERPL-SCNC: 105 MMOL/L (ref 98–107)
CHLORIDE SERPL-SCNC: 110 MMOL/L (ref 98–107)
CHLORIDE SERPL-SCNC: 98 MMOL/L (ref 98–107)
CK SERPL-CCNC: 373 U/L (ref 30–223)
CO2 SERPL-SCNC: 17 MMOL/L (ref 21–31)
CO2 SERPL-SCNC: 24 MMOL/L (ref 21–31)
CO2 SERPL-SCNC: 25 MMOL/L (ref 21–31)
CO2 SERPL-SCNC: 28 MMOL/L (ref 21–31)
CO2 SERPL-SCNC: 32 MMOL/L (ref 21–31)
COCAINE UR QL: NOT DETECTED
COLOR UR AUTO: NORMAL
CREAT SERPL-MCNC: 0.74 MG/DL (ref 0.6–1.2)
CREAT SERPL-MCNC: 0.75 MG/DL (ref 0.6–1.2)
CREAT SERPL-MCNC: 0.88 MG/DL (ref 0.6–1.2)
CREAT SERPL-MCNC: 0.95 MG/DL (ref 0.6–1.2)
CREAT SERPL-MCNC: 1.08 MG/DL (ref 0.6–1.2)
CRP SERPL-MCNC: 3.3 MG/L
D-METHAMPHET UR QL: NOT DETECTED NG/ML
DIFFERENTIAL METHOD BLD: ABNORMAL
EOSINOPHIL # BLD AUTO: 0.1 10E9/L (ref 0–0.7)
EOSINOPHIL # BLD AUTO: 0.2 10E9/L (ref 0–0.7)
EOSINOPHIL NFR BLD AUTO: 1.1 %
EOSINOPHIL NFR BLD AUTO: 1.3 %
EOSINOPHIL NFR BLD AUTO: 2.6 %
EOSINOPHIL NFR BLD AUTO: 3.3 %
ERYTHROCYTE [DISTWIDTH] IN BLOOD BY AUTOMATED COUNT: 15 % (ref 10–15)
ERYTHROCYTE [DISTWIDTH] IN BLOOD BY AUTOMATED COUNT: 15.2 % (ref 10–15)
ERYTHROCYTE [DISTWIDTH] IN BLOOD BY AUTOMATED COUNT: 15.3 % (ref 10–15)
ERYTHROCYTE [DISTWIDTH] IN BLOOD BY AUTOMATED COUNT: 16.6 % (ref 10–15)
ERYTHROCYTE [DISTWIDTH] IN BLOOD BY AUTOMATED COUNT: 17.2 % (ref 10–15)
ETHANOL SERPL-MCNC: 0.32 %
ETHANOL SERPL-MCNC: 0.38 %
ETHANOL SERPL-MCNC: <0.01 %
GFR SERPL CREATININE-BSD FRML MDRD: 52 ML/MIN/{1.73_M2}
GFR SERPL CREATININE-BSD FRML MDRD: 60 ML/MIN/{1.73_M2}
GFR SERPL CREATININE-BSD FRML MDRD: 65 ML/MIN/{1.73_M2}
GFR SERPL CREATININE-BSD FRML MDRD: 79 ML/MIN/{1.73_M2}
GFR SERPL CREATININE-BSD FRML MDRD: 80 ML/MIN/{1.73_M2}
GLUCOSE SERPL-MCNC: 103 MG/DL (ref 70–105)
GLUCOSE SERPL-MCNC: 133 MG/DL (ref 70–105)
GLUCOSE SERPL-MCNC: 89 MG/DL (ref 70–105)
GLUCOSE SERPL-MCNC: 95 MG/DL (ref 70–105)
GLUCOSE SERPL-MCNC: 98 MG/DL (ref 70–105)
GLUCOSE UR STRIP-MCNC: NEGATIVE MG/DL
HCT VFR BLD AUTO: 33.7 % (ref 35–47)
HCT VFR BLD AUTO: 34.8 % (ref 35–47)
HCT VFR BLD AUTO: 36.2 % (ref 35–47)
HCT VFR BLD AUTO: 37.4 % (ref 35–47)
HCT VFR BLD AUTO: 38.7 % (ref 35–47)
HGB BLD-MCNC: 10.8 G/DL (ref 11.7–15.7)
HGB BLD-MCNC: 11.2 G/DL (ref 11.7–15.7)
HGB BLD-MCNC: 12 G/DL (ref 11.7–15.7)
HGB BLD-MCNC: 12.2 G/DL (ref 11.7–15.7)
HGB BLD-MCNC: 12.2 G/DL (ref 11.7–15.7)
HGB UR QL STRIP: NEGATIVE
IMM GRANULOCYTES # BLD: 0 10E9/L (ref 0–0.4)
IMM GRANULOCYTES NFR BLD: 0.3 %
IMM GRANULOCYTES NFR BLD: 0.5 %
IMM GRANULOCYTES NFR BLD: 0.5 %
IMM GRANULOCYTES NFR BLD: 0.8 %
INR PPP: 0.99 (ref 0–1.3)
INR PPP: 1.01 (ref 0–1.3)
INTERPRETATION ECG - MUSE: NORMAL
KETONES UR STRIP-MCNC: NEGATIVE MG/DL
LACTATE BLD-SCNC: 0.5 MMOL/L (ref 0.7–2)
LACTATE BLD-SCNC: 1.1 MMOL/L (ref 0.7–2)
LEUKOCYTE ESTERASE UR QL STRIP: NEGATIVE
LIPASE SERPL-CCNC: 22 U/L (ref 11–82)
LIPASE SERPL-CCNC: 38 U/L (ref 11–82)
LYMPHOCYTES # BLD AUTO: 1 10E9/L (ref 0.8–5.3)
LYMPHOCYTES # BLD AUTO: 1.4 10E9/L (ref 0.8–5.3)
LYMPHOCYTES # BLD AUTO: 1.6 10E9/L (ref 0.8–5.3)
LYMPHOCYTES # BLD AUTO: 2 10E9/L (ref 0.8–5.3)
LYMPHOCYTES NFR BLD AUTO: 16.1 %
LYMPHOCYTES NFR BLD AUTO: 23.4 %
LYMPHOCYTES NFR BLD AUTO: 39.2 %
LYMPHOCYTES NFR BLD AUTO: 39.4 %
MAGNESIUM SERPL-MCNC: 1.4 MG/DL (ref 1.9–2.7)
MAGNESIUM SERPL-MCNC: 1.8 MG/DL (ref 1.9–2.7)
MAGNESIUM SERPL-MCNC: 2.1 MG/DL (ref 1.9–2.7)
MAGNESIUM SERPL-MCNC: 3 MG/DL (ref 1.9–2.7)
MCH RBC QN AUTO: 28.9 PG (ref 26.5–33)
MCH RBC QN AUTO: 29 PG (ref 26.5–33)
MCH RBC QN AUTO: 30 PG (ref 26.5–33)
MCH RBC QN AUTO: 30.3 PG (ref 26.5–33)
MCH RBC QN AUTO: 31 PG (ref 26.5–33)
MCHC RBC AUTO-ENTMCNC: 31.5 G/DL (ref 31.5–36.5)
MCHC RBC AUTO-ENTMCNC: 32 G/DL (ref 31.5–36.5)
MCHC RBC AUTO-ENTMCNC: 32.2 G/DL (ref 31.5–36.5)
MCHC RBC AUTO-ENTMCNC: 32.6 G/DL (ref 31.5–36.5)
MCHC RBC AUTO-ENTMCNC: 33.1 G/DL (ref 31.5–36.5)
MCV RBC AUTO: 90 FL (ref 78–100)
MCV RBC AUTO: 90 FL (ref 78–100)
MCV RBC AUTO: 91 FL (ref 78–100)
MCV RBC AUTO: 95 FL (ref 78–100)
MCV RBC AUTO: 96 FL (ref 78–100)
METHADONE UR QL SCN: NOT DETECTED
MONOCYTES # BLD AUTO: 0.4 10E9/L (ref 0–1.3)
MONOCYTES # BLD AUTO: 0.6 10E9/L (ref 0–1.3)
MONOCYTES # BLD AUTO: 0.7 10E9/L (ref 0–1.3)
MONOCYTES # BLD AUTO: 0.8 10E9/L (ref 0–1.3)
MONOCYTES NFR BLD AUTO: 10.5 %
MONOCYTES NFR BLD AUTO: 13.8 %
MONOCYTES NFR BLD AUTO: 14.9 %
MONOCYTES NFR BLD AUTO: 7 %
NEUTROPHILS # BLD AUTO: 1.8 10E9/L (ref 1.6–8.3)
NEUTROPHILS # BLD AUTO: 2.5 10E9/L (ref 1.6–8.3)
NEUTROPHILS # BLD AUTO: 3.7 10E9/L (ref 1.6–8.3)
NEUTROPHILS # BLD AUTO: 4.6 10E9/L (ref 1.6–8.3)
NEUTROPHILS NFR BLD AUTO: 42.1 %
NEUTROPHILS NFR BLD AUTO: 49.3 %
NEUTROPHILS NFR BLD AUTO: 60.8 %
NEUTROPHILS NFR BLD AUTO: 71.8 %
NITRATE UR QL: NEGATIVE
OPIATES UR QL SCN: NOT DETECTED
OXYCODONE UR QL: NOT DETECTED NG/ML
PCP UR QL SCN: NOT DETECTED
PH UR STRIP: 6 PH (ref 5–7)
PLATELET # BLD AUTO: 151 10E9/L (ref 150–450)
PLATELET # BLD AUTO: 174 10E9/L (ref 150–450)
PLATELET # BLD AUTO: 180 10E9/L (ref 150–450)
PLATELET # BLD AUTO: 191 10E9/L (ref 150–450)
PLATELET # BLD AUTO: 229 10E9/L (ref 150–450)
POTASSIUM SERPL-SCNC: 3.4 MMOL/L (ref 3.5–5.1)
POTASSIUM SERPL-SCNC: 3.5 MMOL/L (ref 3.5–5.1)
POTASSIUM SERPL-SCNC: 3.8 MMOL/L (ref 3.5–5.1)
PROPOXYPH UR QL: NOT DETECTED NG/ML
PROT SERPL-MCNC: 6 G/DL (ref 6.4–8.9)
PROT SERPL-MCNC: 6.1 G/DL (ref 6.4–8.9)
PROT SERPL-MCNC: 6.5 G/DL (ref 6.4–8.9)
RBC # BLD AUTO: 3.73 10E12/L (ref 3.8–5.2)
RBC # BLD AUTO: 3.88 10E12/L (ref 3.8–5.2)
RBC # BLD AUTO: 3.94 10E12/L (ref 3.8–5.2)
RBC # BLD AUTO: 4 10E12/L (ref 3.8–5.2)
RBC # BLD AUTO: 4.03 10E12/L (ref 3.8–5.2)
SALICYLATES SERPL-MCNC: <0 MG/DL (ref 15–30)
SALICYLATES SERPL-MCNC: <0 MG/DL (ref 15–30)
SARS-COV-2 RNA SPEC QL NAA+PROBE: NOT DETECTED
SODIUM SERPL-SCNC: 131 MMOL/L (ref 134–144)
SODIUM SERPL-SCNC: 136 MMOL/L (ref 134–144)
SODIUM SERPL-SCNC: 138 MMOL/L (ref 134–144)
SODIUM SERPL-SCNC: 142 MMOL/L (ref 134–144)
SODIUM SERPL-SCNC: 142 MMOL/L (ref 134–144)
SOURCE: NORMAL
SP GR UR STRIP: 1 (ref 1–1.03)
SPECIMEN SOURCE: NORMAL
TRICYCLICS UR QL SCN: NOT DETECTED NG/ML
TROPONIN I SERPL-MCNC: 8.4 PG/ML
TSH SERPL DL<=0.05 MIU/L-ACNC: 1.1 IU/ML (ref 0.34–5.6)
TSH SERPL DL<=0.05 MIU/L-ACNC: 1.49 IU/ML (ref 0.34–5.6)
UROBILINOGEN UR STRIP-MCNC: NORMAL MG/DL (ref 0–2)
WBC # BLD AUTO: 4.2 10E9/L (ref 4–11)
WBC # BLD AUTO: 4.2 10E9/L (ref 4–11)
WBC # BLD AUTO: 5.1 10E9/L (ref 4–11)
WBC # BLD AUTO: 6 10E9/L (ref 4–11)
WBC # BLD AUTO: 6.4 10E9/L (ref 4–11)

## 2020-01-01 PROCEDURE — 85730 THROMBOPLASTIN TIME PARTIAL: CPT | Performed by: FAMILY MEDICINE

## 2020-01-01 PROCEDURE — 250N000009 HC RX 250: Performed by: PHYSICIAN ASSISTANT

## 2020-01-01 PROCEDURE — 72125 CT NECK SPINE W/O DYE: CPT

## 2020-01-01 PROCEDURE — 250N000011 HC RX IP 250 OP 636: Performed by: FAMILY MEDICINE

## 2020-01-01 PROCEDURE — 36415 COLL VENOUS BLD VENIPUNCTURE: CPT | Performed by: INTERNAL MEDICINE

## 2020-01-01 PROCEDURE — 99285 EMERGENCY DEPT VISIT HI MDM: CPT | Mod: 25 | Performed by: PHYSICIAN ASSISTANT

## 2020-01-01 PROCEDURE — 25800030 ZZH RX IP 258 OP 636: Performed by: FAMILY MEDICINE

## 2020-01-01 PROCEDURE — 99201 ZZC OFFICE/OUTPT VISIT, NEW, LEVEL I: CPT | Performed by: SURGERY

## 2020-01-01 PROCEDURE — 81003 URINALYSIS AUTO W/O SCOPE: CPT | Mod: XU | Performed by: PHYSICIAN ASSISTANT

## 2020-01-01 PROCEDURE — 83735 ASSAY OF MAGNESIUM: CPT | Performed by: EMERGENCY MEDICINE

## 2020-01-01 PROCEDURE — 84443 ASSAY THYROID STIM HORMONE: CPT | Performed by: PHYSICIAN ASSISTANT

## 2020-01-01 PROCEDURE — 25000132 ZZH RX MED GY IP 250 OP 250 PS 637: Mod: GY | Performed by: PHYSICIAN ASSISTANT

## 2020-01-01 PROCEDURE — 99285 EMERGENCY DEPT VISIT HI MDM: CPT | Mod: Z6 | Performed by: PHYSICIAN ASSISTANT

## 2020-01-01 PROCEDURE — 25000128 H RX IP 250 OP 636: Performed by: PHYSICIAN ASSISTANT

## 2020-01-01 PROCEDURE — 25000128 H RX IP 250 OP 636: Performed by: RADIOLOGY

## 2020-01-01 PROCEDURE — 25000125 ZZHC RX 250: Performed by: PHYSICIAN ASSISTANT

## 2020-01-01 PROCEDURE — 36415 COLL VENOUS BLD VENIPUNCTURE: CPT | Performed by: EMERGENCY MEDICINE

## 2020-01-01 PROCEDURE — 97161 PT EVAL LOW COMPLEX 20 MIN: CPT | Mod: GP

## 2020-01-01 PROCEDURE — 25500064 ZZH RX 255 OP 636: Performed by: RADIOLOGY

## 2020-01-01 PROCEDURE — 80053 COMPREHEN METABOLIC PANEL: CPT | Performed by: PHYSICIAN ASSISTANT

## 2020-01-01 PROCEDURE — 99284 EMERGENCY DEPT VISIT MOD MDM: CPT | Performed by: PHYSICIAN ASSISTANT

## 2020-01-01 PROCEDURE — 25000128 H RX IP 250 OP 636: Performed by: EMERGENCY MEDICINE

## 2020-01-01 PROCEDURE — 99285 EMERGENCY DEPT VISIT HI MDM: CPT | Mod: 25 | Performed by: FAMILY MEDICINE

## 2020-01-01 PROCEDURE — 80320 DRUG SCREEN QUANTALCOHOLS: CPT | Performed by: PHYSICIAN ASSISTANT

## 2020-01-01 PROCEDURE — 86140 C-REACTIVE PROTEIN: CPT | Performed by: PHYSICIAN ASSISTANT

## 2020-01-01 PROCEDURE — 83735 ASSAY OF MAGNESIUM: CPT | Performed by: PHYSICIAN ASSISTANT

## 2020-01-01 PROCEDURE — 71045 X-RAY EXAM CHEST 1 VIEW: CPT

## 2020-01-01 PROCEDURE — 85025 COMPLETE CBC W/AUTO DIFF WBC: CPT | Performed by: EMERGENCY MEDICINE

## 2020-01-01 PROCEDURE — 99220 ZZC INITIAL OBSERVATION CARE,LEVL III: CPT | Performed by: INTERNAL MEDICINE

## 2020-01-01 PROCEDURE — 62323 NJX INTERLAMINAR LMBR/SAC: CPT

## 2020-01-01 PROCEDURE — 84443 ASSAY THYROID STIM HORMONE: CPT | Performed by: FAMILY MEDICINE

## 2020-01-01 PROCEDURE — 97110 THERAPEUTIC EXERCISES: CPT | Mod: GP

## 2020-01-01 PROCEDURE — 90471 IMMUNIZATION ADMIN: CPT | Performed by: PHYSICIAN ASSISTANT

## 2020-01-01 PROCEDURE — 85025 COMPLETE CBC W/AUTO DIFF WBC: CPT | Performed by: PHYSICIAN ASSISTANT

## 2020-01-01 PROCEDURE — 99284 EMERGENCY DEPT VISIT MOD MDM: CPT | Mod: Z6 | Performed by: PHYSICIAN ASSISTANT

## 2020-01-01 PROCEDURE — 82140 ASSAY OF AMMONIA: CPT | Performed by: FAMILY MEDICINE

## 2020-01-01 PROCEDURE — 80329 ANALGESICS NON-OPIOID 1 OR 2: CPT | Performed by: PHYSICIAN ASSISTANT

## 2020-01-01 PROCEDURE — 36410 VNPNXR 3YR/> PHY/QHP DX/THER: CPT | Performed by: NURSE ANESTHETIST, CERTIFIED REGISTERED

## 2020-01-01 PROCEDURE — 36415 COLL VENOUS BLD VENIPUNCTURE: CPT | Performed by: FAMILY MEDICINE

## 2020-01-01 PROCEDURE — 25000125 ZZHC RX 250: Performed by: RADIOLOGY

## 2020-01-01 PROCEDURE — 99284 EMERGENCY DEPT VISIT MOD MDM: CPT | Mod: 25 | Performed by: PHYSICIAN ASSISTANT

## 2020-01-01 PROCEDURE — 85610 PROTHROMBIN TIME: CPT | Mod: GZ | Performed by: FAMILY MEDICINE

## 2020-01-01 PROCEDURE — 25000128 H RX IP 250 OP 636: Performed by: INTERNAL MEDICINE

## 2020-01-01 PROCEDURE — 70450 CT HEAD/BRAIN W/O DYE: CPT

## 2020-01-01 PROCEDURE — 82550 ASSAY OF CK (CPK): CPT | Performed by: FAMILY MEDICINE

## 2020-01-01 PROCEDURE — 36415 COLL VENOUS BLD VENIPUNCTURE: CPT | Mod: XU | Performed by: EMERGENCY MEDICINE

## 2020-01-01 PROCEDURE — G0378 HOSPITAL OBSERVATION PER HR: HCPCS

## 2020-01-01 PROCEDURE — G0463 HOSPITAL OUTPT CLINIC VISIT: HCPCS

## 2020-01-01 PROCEDURE — 94640 AIRWAY INHALATION TREATMENT: CPT

## 2020-01-01 PROCEDURE — 83605 ASSAY OF LACTIC ACID: CPT | Performed by: PHYSICIAN ASSISTANT

## 2020-01-01 PROCEDURE — 96361 HYDRATE IV INFUSION ADD-ON: CPT | Performed by: PHYSICIAN ASSISTANT

## 2020-01-01 PROCEDURE — 10060 I&D ABSCESS SIMPLE/SINGLE: CPT | Performed by: PHYSICIAN ASSISTANT

## 2020-01-01 PROCEDURE — 250N000011 HC RX IP 250 OP 636: Performed by: RADIOLOGY

## 2020-01-01 PROCEDURE — 258N000003 HC RX IP 258 OP 636: Performed by: PHYSICIAN ASSISTANT

## 2020-01-01 PROCEDURE — 93010 ELECTROCARDIOGRAM REPORT: CPT | Performed by: INTERNAL MEDICINE

## 2020-01-01 PROCEDURE — 96376 TX/PRO/DX INJ SAME DRUG ADON: CPT

## 2020-01-01 PROCEDURE — 255N000002 HC RX 255 OP 636: Performed by: RADIOLOGY

## 2020-01-01 PROCEDURE — 84484 ASSAY OF TROPONIN QUANT: CPT | Performed by: FAMILY MEDICINE

## 2020-01-01 PROCEDURE — 80048 BASIC METABOLIC PNL TOTAL CA: CPT | Performed by: EMERGENCY MEDICINE

## 2020-01-01 PROCEDURE — 80048 BASIC METABOLIC PNL TOTAL CA: CPT | Performed by: INTERNAL MEDICINE

## 2020-01-01 PROCEDURE — 85027 COMPLETE CBC AUTOMATED: CPT | Performed by: INTERNAL MEDICINE

## 2020-01-01 PROCEDURE — U0003 INFECTIOUS AGENT DETECTION BY NUCLEIC ACID (DNA OR RNA); SEVERE ACUTE RESPIRATORY SYNDROME CORONAVIRUS 2 (SARS-COV-2) (CORONAVIRUS DISEASE [COVID-19]), AMPLIFIED PROBE TECHNIQUE, MAKING USE OF HIGH THROUGHPUT TECHNOLOGIES AS DESCRIBED BY CMS-2020-01-R: HCPCS | Mod: ZL | Performed by: FAMILY MEDICINE

## 2020-01-01 PROCEDURE — 80320 DRUG SCREEN QUANTALCOHOLS: CPT | Performed by: FAMILY MEDICINE

## 2020-01-01 PROCEDURE — 25800030 ZZH RX IP 258 OP 636: Performed by: INTERNAL MEDICINE

## 2020-01-01 PROCEDURE — 83690 ASSAY OF LIPASE: CPT | Performed by: FAMILY MEDICINE

## 2020-01-01 PROCEDURE — 25800030 ZZH RX IP 258 OP 636: Performed by: PHYSICIAN ASSISTANT

## 2020-01-01 PROCEDURE — 99217 ZZC OBSERVATION CARE DISCHARGE: CPT | Performed by: INTERNAL MEDICINE

## 2020-01-01 PROCEDURE — C9803 HOPD COVID-19 SPEC COLLECT: HCPCS

## 2020-01-01 PROCEDURE — 85730 THROMBOPLASTIN TIME PARTIAL: CPT | Mod: GZ | Performed by: PHYSICIAN ASSISTANT

## 2020-01-01 PROCEDURE — U0003 INFECTIOUS AGENT DETECTION BY NUCLEIC ACID (DNA OR RNA); SEVERE ACUTE RESPIRATORY SYNDROME CORONAVIRUS 2 (SARS-COV-2) (CORONAVIRUS DISEASE [COVID-19]), AMPLIFIED PROBE TECHNIQUE, MAKING USE OF HIGH THROUGHPUT TECHNOLOGIES AS DESCRIBED BY CMS-2020-01-R: HCPCS | Performed by: PHYSICIAN ASSISTANT

## 2020-01-01 PROCEDURE — 64483 NJX AA&/STRD TFRM EPI L/S 1: CPT

## 2020-01-01 PROCEDURE — 80307 DRUG TEST PRSMV CHEM ANLYZR: CPT | Performed by: FAMILY MEDICINE

## 2020-01-01 PROCEDURE — 80307 DRUG TEST PRSMV CHEM ANLYZR: CPT | Performed by: PHYSICIAN ASSISTANT

## 2020-01-01 PROCEDURE — 83735 ASSAY OF MAGNESIUM: CPT | Performed by: FAMILY MEDICINE

## 2020-01-01 PROCEDURE — C9803 HOPD COVID-19 SPEC COLLECT: HCPCS | Performed by: PHYSICIAN ASSISTANT

## 2020-01-01 PROCEDURE — U0003 INFECTIOUS AGENT DETECTION BY NUCLEIC ACID (DNA OR RNA); SEVERE ACUTE RESPIRATORY SYNDROME CORONAVIRUS 2 (SARS-COV-2) (CORONAVIRUS DISEASE [COVID-19]), AMPLIFIED PROBE TECHNIQUE, MAKING USE OF HIGH THROUGHPUT TECHNOLOGIES AS DESCRIBED BY CMS-2020-01-R: HCPCS | Performed by: FAMILY MEDICINE

## 2020-01-01 PROCEDURE — 96374 THER/PROPH/DIAG INJ IV PUSH: CPT | Performed by: PHYSICIAN ASSISTANT

## 2020-01-01 PROCEDURE — 25000128 H RX IP 250 OP 636: Performed by: FAMILY MEDICINE

## 2020-01-01 PROCEDURE — 99284 EMERGENCY DEPT VISIT MOD MDM: CPT | Mod: Z6 | Performed by: FAMILY MEDICINE

## 2020-01-01 PROCEDURE — 25000125 ZZHC RX 250: Performed by: FAMILY MEDICINE

## 2020-01-01 PROCEDURE — C9803 HOPD COVID-19 SPEC COLLECT: HCPCS | Performed by: FAMILY MEDICINE

## 2020-01-01 PROCEDURE — 25000132 ZZH RX MED GY IP 250 OP 250 PS 637: Mod: GY | Performed by: INTERNAL MEDICINE

## 2020-01-01 PROCEDURE — 96375 TX/PRO/DX INJ NEW DRUG ADDON: CPT | Mod: XU | Performed by: PHYSICIAN ASSISTANT

## 2020-01-01 PROCEDURE — 96365 THER/PROPH/DIAG IV INF INIT: CPT | Performed by: PHYSICIAN ASSISTANT

## 2020-01-01 PROCEDURE — 87040 BLOOD CULTURE FOR BACTERIA: CPT | Performed by: PHYSICIAN ASSISTANT

## 2020-01-01 PROCEDURE — 83605 ASSAY OF LACTIC ACID: CPT | Performed by: FAMILY MEDICINE

## 2020-01-01 PROCEDURE — 80329 ANALGESICS NON-OPIOID 1 OR 2: CPT | Performed by: FAMILY MEDICINE

## 2020-01-01 PROCEDURE — 99207 PR NO CHARGE NURSE ONLY: CPT

## 2020-01-01 PROCEDURE — 97110 THERAPEUTIC EXERCISES: CPT | Mod: GP,CQ

## 2020-01-01 PROCEDURE — 85610 PROTHROMBIN TIME: CPT | Performed by: PHYSICIAN ASSISTANT

## 2020-01-01 PROCEDURE — 25000132 ZZH RX MED GY IP 250 OP 250 PS 637: Mod: GY | Performed by: FAMILY MEDICINE

## 2020-01-01 PROCEDURE — 36415 COLL VENOUS BLD VENIPUNCTURE: CPT | Performed by: PHYSICIAN ASSISTANT

## 2020-01-01 PROCEDURE — 250N000011 HC RX IP 250 OP 636: Performed by: PHYSICIAN ASSISTANT

## 2020-01-01 PROCEDURE — 250N000009 HC RX 250: Performed by: RADIOLOGY

## 2020-01-01 PROCEDURE — G0463 HOSPITAL OUTPT CLINIC VISIT: HCPCS | Mod: 25

## 2020-01-01 PROCEDURE — 96372 THER/PROPH/DIAG INJ SC/IM: CPT | Performed by: FAMILY MEDICINE

## 2020-01-01 PROCEDURE — 80329 ANALGESICS NON-OPIOID 1 OR 2: CPT | Mod: 91 | Performed by: PHYSICIAN ASSISTANT

## 2020-01-01 PROCEDURE — 83690 ASSAY OF LIPASE: CPT | Performed by: PHYSICIAN ASSISTANT

## 2020-01-01 PROCEDURE — 80053 COMPREHEN METABOLIC PANEL: CPT | Performed by: FAMILY MEDICINE

## 2020-01-01 PROCEDURE — 99213 OFFICE O/P EST LOW 20 MIN: CPT | Performed by: FAMILY MEDICINE

## 2020-01-01 PROCEDURE — 40000275 ZZH STATISTIC RCP TIME EA 10 MIN

## 2020-01-01 PROCEDURE — 96365 THER/PROPH/DIAG IV INF INIT: CPT | Mod: XU | Performed by: PHYSICIAN ASSISTANT

## 2020-01-01 PROCEDURE — 10060 I&D ABSCESS SIMPLE/SINGLE: CPT | Mod: Z6 | Performed by: PHYSICIAN ASSISTANT

## 2020-01-01 PROCEDURE — 85025 COMPLETE CBC W/AUTO DIFF WBC: CPT | Performed by: FAMILY MEDICINE

## 2020-01-01 PROCEDURE — 90715 TDAP VACCINE 7 YRS/> IM: CPT | Performed by: PHYSICIAN ASSISTANT

## 2020-01-01 RX ORDER — PREGABALIN 150 MG/1
CAPSULE ORAL
COMMUNITY
Start: 2020-01-01

## 2020-01-01 RX ORDER — LIDOCAINE HYDROCHLORIDE 10 MG/ML
2 INJECTION, SOLUTION INFILTRATION; PERINEURAL ONCE
Status: COMPLETED | OUTPATIENT
Start: 2020-01-01 | End: 2020-01-01

## 2020-01-01 RX ORDER — LIDOCAINE HYDROCHLORIDE 10 MG/ML
2 INJECTION, SOLUTION EPIDURAL; INFILTRATION; INTRACAUDAL; PERINEURAL ONCE
Status: COMPLETED | OUTPATIENT
Start: 2020-01-01 | End: 2020-01-01

## 2020-01-01 RX ORDER — DEXAMETHASONE SODIUM PHOSPHATE 10 MG/ML
10 INJECTION, SOLUTION INTRAMUSCULAR; INTRAVENOUS ONCE
Status: DISCONTINUED | OUTPATIENT
Start: 2020-01-01 | End: 2020-01-01 | Stop reason: HOSPADM

## 2020-01-01 RX ORDER — NALOXONE HYDROCHLORIDE 0.4 MG/ML
.1-.4 INJECTION, SOLUTION INTRAMUSCULAR; INTRAVENOUS; SUBCUTANEOUS
Status: DISCONTINUED | OUTPATIENT
Start: 2020-01-01 | End: 2020-01-01 | Stop reason: HOSPADM

## 2020-01-01 RX ORDER — IPRATROPIUM BROMIDE AND ALBUTEROL SULFATE 2.5; .5 MG/3ML; MG/3ML
3 SOLUTION RESPIRATORY (INHALATION) EVERY 4 HOURS PRN
Status: DISCONTINUED | OUTPATIENT
Start: 2020-01-01 | End: 2020-01-01 | Stop reason: HOSPADM

## 2020-01-01 RX ORDER — PANTOPRAZOLE SODIUM 40 MG/1
40 TABLET, DELAYED RELEASE ORAL 2 TIMES DAILY
Status: DISCONTINUED | OUTPATIENT
Start: 2020-01-01 | End: 2020-01-01 | Stop reason: HOSPADM

## 2020-01-01 RX ORDER — POTASSIUM CHLORIDE 7.45 MG/ML
10 INJECTION INTRAVENOUS CONTINUOUS
Status: DISCONTINUED | OUTPATIENT
Start: 2020-01-01 | End: 2020-01-01

## 2020-01-01 RX ORDER — POTASSIUM CHLORIDE 20MEQ/15ML
40 LIQUID (ML) ORAL ONCE
Status: DISCONTINUED | OUTPATIENT
Start: 2020-01-01 | End: 2020-01-01

## 2020-01-01 RX ORDER — MAGNESIUM SULFATE HEPTAHYDRATE 40 MG/ML
2 INJECTION, SOLUTION INTRAVENOUS DAILY PRN
Status: DISCONTINUED | OUTPATIENT
Start: 2020-01-01 | End: 2020-01-01 | Stop reason: HOSPADM

## 2020-01-01 RX ORDER — ACETAMINOPHEN 325 MG/1
650 TABLET ORAL EVERY 4 HOURS PRN
Status: DISCONTINUED | OUTPATIENT
Start: 2020-01-01 | End: 2020-01-01 | Stop reason: HOSPADM

## 2020-01-01 RX ORDER — MULTIPLE VITAMINS W/ MINERALS TAB 9MG-400MCG
1 TAB ORAL DAILY
Status: DISCONTINUED | OUTPATIENT
Start: 2020-01-01 | End: 2020-01-01 | Stop reason: HOSPADM

## 2020-01-01 RX ORDER — MAGNESIUM SULFATE HEPTAHYDRATE 40 MG/ML
4 INJECTION, SOLUTION INTRAVENOUS EVERY 4 HOURS PRN
Status: DISCONTINUED | OUTPATIENT
Start: 2020-01-01 | End: 2020-01-01 | Stop reason: HOSPADM

## 2020-01-01 RX ORDER — DULOXETIN HYDROCHLORIDE 30 MG/1
60 CAPSULE, DELAYED RELEASE ORAL 2 TIMES DAILY
Status: DISCONTINUED | OUTPATIENT
Start: 2020-01-01 | End: 2020-01-01 | Stop reason: HOSPADM

## 2020-01-01 RX ORDER — HYDROCORTISONE 2.5 %
1 CREAM (GRAM) TOPICAL
COMMUNITY
Start: 2020-01-01

## 2020-01-01 RX ORDER — ONDANSETRON 2 MG/ML
4 INJECTION INTRAMUSCULAR; INTRAVENOUS EVERY 6 HOURS PRN
Status: DISCONTINUED | OUTPATIENT
Start: 2020-01-01 | End: 2020-01-01 | Stop reason: HOSPADM

## 2020-01-01 RX ORDER — MAGNESIUM OXIDE 400 MG/1
400 TABLET ORAL
COMMUNITY
Start: 2020-01-08

## 2020-01-01 RX ORDER — LAMOTRIGINE 25 MG/1
1 TABLET ORAL DAILY
COMMUNITY
Start: 2020-01-01

## 2020-01-01 RX ORDER — LIDOCAINE HYDROCHLORIDE 10 MG/ML
10 INJECTION, SOLUTION INFILTRATION; PERINEURAL ONCE
Status: COMPLETED | OUTPATIENT
Start: 2020-01-01 | End: 2020-01-01

## 2020-01-01 RX ORDER — CLINDAMYCIN HCL 300 MG
300 CAPSULE ORAL
Status: ON HOLD | COMMUNITY
Start: 2020-01-01 | End: 2020-01-01

## 2020-01-01 RX ORDER — CEPHALEXIN 500 MG/1
500 CAPSULE ORAL 4 TIMES DAILY
Qty: 28 CAPSULE | Refills: 0 | Status: SHIPPED | OUTPATIENT
Start: 2020-01-01 | End: 2020-01-01

## 2020-01-01 RX ORDER — LANOLIN ALCOHOL/MO/W.PET/CERES
100 CREAM (GRAM) TOPICAL DAILY
Status: DISCONTINUED | OUTPATIENT
Start: 2020-01-01 | End: 2020-01-01 | Stop reason: HOSPADM

## 2020-01-01 RX ORDER — KETOROLAC TROMETHAMINE 30 MG/ML
30 INJECTION, SOLUTION INTRAMUSCULAR; INTRAVENOUS ONCE
Status: COMPLETED | OUTPATIENT
Start: 2020-01-01 | End: 2020-01-01

## 2020-01-01 RX ORDER — SODIUM CHLORIDE 9 MG/ML
INJECTION, SOLUTION INTRAVENOUS CONTINUOUS
Status: DISCONTINUED | OUTPATIENT
Start: 2020-01-01 | End: 2020-01-01 | Stop reason: HOSPADM

## 2020-01-01 RX ORDER — LANOLIN ALCOHOL/MO/W.PET/CERES
1000 CREAM (GRAM) TOPICAL
COMMUNITY
Start: 2020-01-01

## 2020-01-01 RX ORDER — ONDANSETRON 2 MG/ML
4 INJECTION INTRAMUSCULAR; INTRAVENOUS EVERY 6 HOURS PRN
Status: DISCONTINUED | OUTPATIENT
Start: 2020-01-01 | End: 2020-01-01

## 2020-01-01 RX ORDER — ACETAMINOPHEN 325 MG/1
325 TABLET ORAL ONCE
Status: COMPLETED | OUTPATIENT
Start: 2020-01-01 | End: 2020-01-01

## 2020-01-01 RX ORDER — CEFAZOLIN SODIUM 1 G/3ML
1 INJECTION, POWDER, FOR SOLUTION INTRAMUSCULAR; INTRAVENOUS EVERY 8 HOURS
Status: DISCONTINUED | OUTPATIENT
Start: 2020-01-01 | End: 2020-01-01 | Stop reason: HOSPADM

## 2020-01-01 RX ORDER — SODIUM CHLORIDE 9 MG/ML
INJECTION, SOLUTION INTRAVENOUS CONTINUOUS
Status: DISCONTINUED | OUTPATIENT
Start: 2020-01-01 | End: 2020-01-01

## 2020-01-01 RX ORDER — POLYETHYLENE GLYCOL 3350 17 G/17G
17 POWDER, FOR SOLUTION ORAL DAILY PRN
Status: DISCONTINUED | OUTPATIENT
Start: 2020-01-01 | End: 2020-01-01 | Stop reason: HOSPADM

## 2020-01-01 RX ORDER — BUPROPION HYDROCHLORIDE 150 MG/1
150 TABLET ORAL EVERY MORNING
Status: DISCONTINUED | OUTPATIENT
Start: 2020-01-01 | End: 2020-01-01 | Stop reason: HOSPADM

## 2020-01-01 RX ORDER — LANOLIN ALCOHOL/MO/W.PET/CERES
100 CREAM (GRAM) TOPICAL ONCE
Status: COMPLETED | OUTPATIENT
Start: 2020-01-01 | End: 2020-01-01

## 2020-01-01 RX ORDER — FOLIC ACID 1 MG/1
1 TABLET ORAL ONCE
Status: COMPLETED | OUTPATIENT
Start: 2020-01-01 | End: 2020-01-01

## 2020-01-01 RX ORDER — IPRATROPIUM BROMIDE AND ALBUTEROL SULFATE 2.5; .5 MG/3ML; MG/3ML
3 SOLUTION RESPIRATORY (INHALATION) ONCE
Status: COMPLETED | OUTPATIENT
Start: 2020-01-01 | End: 2020-01-01

## 2020-01-01 RX ORDER — LORAZEPAM 2 MG/ML
1-2 INJECTION INTRAMUSCULAR EVERY 30 MIN PRN
Status: DISCONTINUED | OUTPATIENT
Start: 2020-01-01 | End: 2020-01-01 | Stop reason: HOSPADM

## 2020-01-01 RX ORDER — DEXAMETHASONE SODIUM PHOSPHATE 10 MG/ML
10 INJECTION, SOLUTION INTRAMUSCULAR; INTRAVENOUS ONCE
Status: COMPLETED | OUTPATIENT
Start: 2020-01-01 | End: 2020-01-01

## 2020-01-01 RX ORDER — METHYLPREDNISOLONE ACETATE 80 MG/ML
80 INJECTION, SUSPENSION INTRA-ARTICULAR; INTRALESIONAL; INTRAMUSCULAR; SOFT TISSUE ONCE
Status: COMPLETED | OUTPATIENT
Start: 2020-01-01 | End: 2020-01-01

## 2020-01-01 RX ORDER — CEFAZOLIN SODIUM 1 G/3ML
1 INJECTION, POWDER, FOR SOLUTION INTRAMUSCULAR; INTRAVENOUS ONCE
Status: DISCONTINUED | OUTPATIENT
Start: 2020-01-01 | End: 2020-01-01

## 2020-01-01 RX ORDER — ONDANSETRON 4 MG/1
4 TABLET, ORALLY DISINTEGRATING ORAL EVERY 6 HOURS PRN
Status: DISCONTINUED | OUTPATIENT
Start: 2020-01-01 | End: 2020-01-01

## 2020-01-01 RX ORDER — LIDOCAINE HYDROCHLORIDE AND EPINEPHRINE 10; 10 MG/ML; UG/ML
20 INJECTION, SOLUTION INFILTRATION; PERINEURAL ONCE
Status: COMPLETED | OUTPATIENT
Start: 2020-01-01 | End: 2020-01-01

## 2020-01-01 RX ORDER — ONDANSETRON 4 MG/1
4 TABLET, ORALLY DISINTEGRATING ORAL ONCE
Status: COMPLETED | OUTPATIENT
Start: 2020-01-01 | End: 2020-01-01

## 2020-01-01 RX ORDER — HYDROMORPHONE HYDROCHLORIDE 1 MG/ML
0.2 INJECTION, SOLUTION INTRAMUSCULAR; INTRAVENOUS; SUBCUTANEOUS EVERY 6 HOURS PRN
Status: DISCONTINUED | OUTPATIENT
Start: 2020-01-01 | End: 2020-01-01 | Stop reason: HOSPADM

## 2020-01-01 RX ORDER — MULTIPLE VITAMINS W/ MINERALS TAB 9MG-400MCG
1 TAB ORAL ONCE
Status: COMPLETED | OUTPATIENT
Start: 2020-01-01 | End: 2020-01-01

## 2020-01-01 RX ORDER — HYDROCODONE BITARTRATE AND ACETAMINOPHEN 5; 325 MG/1; MG/1
1-2 TABLET ORAL EVERY 4 HOURS PRN
Status: DISCONTINUED | OUTPATIENT
Start: 2020-01-01 | End: 2020-01-01 | Stop reason: HOSPADM

## 2020-01-01 RX ORDER — POTASSIUM CHLORIDE 1500 MG/1
40 TABLET, EXTENDED RELEASE ORAL ONCE
Status: DISCONTINUED | OUTPATIENT
Start: 2020-01-01 | End: 2020-01-01

## 2020-01-01 RX ORDER — METHYLPREDNISOLONE 4 MG
TABLET, DOSE PACK ORAL
Qty: 21 TABLET | Refills: 0 | Status: SHIPPED | OUTPATIENT
Start: 2020-01-01

## 2020-01-01 RX ORDER — LORAZEPAM 1 MG/1
1-2 TABLET ORAL EVERY 30 MIN PRN
Status: DISCONTINUED | OUTPATIENT
Start: 2020-01-01 | End: 2020-01-01 | Stop reason: HOSPADM

## 2020-01-01 RX ORDER — ONDANSETRON 4 MG/1
4 TABLET, ORALLY DISINTEGRATING ORAL EVERY 6 HOURS PRN
Status: DISCONTINUED | OUTPATIENT
Start: 2020-01-01 | End: 2020-01-01 | Stop reason: HOSPADM

## 2020-01-01 RX ORDER — PREGABALIN 75 MG/1
CAPSULE ORAL
COMMUNITY
Start: 2020-01-01

## 2020-01-01 RX ORDER — PROCHLORPERAZINE MALEATE 10 MG
10 TABLET ORAL EVERY 6 HOURS PRN
Status: DISCONTINUED | OUTPATIENT
Start: 2020-01-01 | End: 2020-01-01 | Stop reason: HOSPADM

## 2020-01-01 RX ORDER — PREGABALIN 25 MG/1
50 CAPSULE ORAL 2 TIMES DAILY
Status: DISCONTINUED | OUTPATIENT
Start: 2020-01-01 | End: 2020-01-01 | Stop reason: HOSPADM

## 2020-01-01 RX ORDER — FOLIC ACID 1 MG/1
1 TABLET ORAL DAILY
Status: DISCONTINUED | OUTPATIENT
Start: 2020-01-01 | End: 2020-01-01 | Stop reason: HOSPADM

## 2020-01-01 RX ORDER — DULOXETIN HYDROCHLORIDE 60 MG/1
CAPSULE, DELAYED RELEASE ORAL
COMMUNITY
Start: 2020-01-01

## 2020-01-01 RX ORDER — LIDOCAINE HYDROCHLORIDE 10 MG/ML
2 INJECTION, SOLUTION EPIDURAL; INFILTRATION; INTRACAUDAL; PERINEURAL ONCE
Status: DISCONTINUED | OUTPATIENT
Start: 2020-01-01 | End: 2020-01-01 | Stop reason: HOSPADM

## 2020-01-01 RX ORDER — LIDOCAINE 40 MG/G
CREAM TOPICAL
Status: DISCONTINUED | OUTPATIENT
Start: 2020-01-01 | End: 2020-01-01 | Stop reason: HOSPADM

## 2020-01-01 RX ORDER — LORAZEPAM 1 MG/1
1 TABLET ORAL ONCE
Status: COMPLETED | OUTPATIENT
Start: 2020-01-01 | End: 2020-01-01

## 2020-01-01 RX ORDER — POTASSIUM CHLORIDE 7.45 MG/ML
10 INJECTION INTRAVENOUS CONTINUOUS
Status: DISCONTINUED | OUTPATIENT
Start: 2020-01-01 | End: 2020-01-01 | Stop reason: HOSPADM

## 2020-01-01 RX ORDER — PROCHLORPERAZINE 25 MG
25 SUPPOSITORY, RECTAL RECTAL EVERY 12 HOURS PRN
Status: DISCONTINUED | OUTPATIENT
Start: 2020-01-01 | End: 2020-01-01 | Stop reason: HOSPADM

## 2020-01-01 RX ORDER — ACETAMINOPHEN 500 MG
500 TABLET ORAL ONCE
Status: COMPLETED | OUTPATIENT
Start: 2020-01-01 | End: 2020-01-01

## 2020-01-01 RX ADMIN — HYDROMORPHONE HYDROCHLORIDE 0.2 MG: 1 INJECTION, SOLUTION INTRAMUSCULAR; INTRAVENOUS; SUBCUTANEOUS at 03:00

## 2020-01-01 RX ADMIN — DULOXETINE HYDROCHLORIDE 60 MG: 30 CAPSULE, DELAYED RELEASE ORAL at 22:52

## 2020-01-01 RX ADMIN — KETOROLAC TROMETHAMINE 30 MG: 30 INJECTION, SOLUTION INTRAMUSCULAR at 18:16

## 2020-01-01 RX ADMIN — DEXAMETHASONE SODIUM PHOSPHATE 10 MG: 10 INJECTION, SOLUTION INTRAMUSCULAR; INTRAVENOUS at 08:21

## 2020-01-01 RX ADMIN — HYDROMORPHONE HYDROCHLORIDE 0.2 MG: 1 INJECTION, SOLUTION INTRAMUSCULAR; INTRAVENOUS; SUBCUTANEOUS at 20:00

## 2020-01-01 RX ADMIN — FOLIC ACID: 5 INJECTION, SOLUTION INTRAMUSCULAR; INTRAVENOUS; SUBCUTANEOUS at 14:00

## 2020-01-01 RX ADMIN — DEXAMETHASONE SODIUM PHOSPHATE 10 MG: 10 INJECTION, SOLUTION INTRAMUSCULAR; INTRAVENOUS at 09:32

## 2020-01-01 RX ADMIN — LIDOCAINE HYDROCHLORIDE 2 ML: 10 INJECTION, SOLUTION EPIDURAL; INFILTRATION; INTRACAUDAL; PERINEURAL at 08:23

## 2020-01-01 RX ADMIN — ACETAMINOPHEN 325 MG: 325 TABLET ORAL at 23:39

## 2020-01-01 RX ADMIN — KETOROLAC TROMETHAMINE 30 MG: 30 INJECTION, SOLUTION INTRAMUSCULAR at 04:11

## 2020-01-01 RX ADMIN — LORAZEPAM 1 MG: 1 TABLET ORAL at 18:17

## 2020-01-01 RX ADMIN — Medication 2 ML: at 09:32

## 2020-01-01 RX ADMIN — MULTIPLE VITAMINS W/ MINERALS TAB 1 TABLET: TAB at 16:18

## 2020-01-01 RX ADMIN — LIDOCAINE HYDROCHLORIDE 2 ML: 10 INJECTION, SOLUTION INFILTRATION; PERINEURAL at 09:33

## 2020-01-01 RX ADMIN — PANTOPRAZOLE SODIUM 40 MG: 40 TABLET, DELAYED RELEASE ORAL at 22:52

## 2020-01-01 RX ADMIN — IOHEXOL 2 ML: 240 INJECTION, SOLUTION INTRATHECAL; INTRAVASCULAR; INTRAVENOUS; ORAL at 08:21

## 2020-01-01 RX ADMIN — ONDANSETRON 4 MG: 4 TABLET, ORALLY DISINTEGRATING ORAL at 20:59

## 2020-01-01 RX ADMIN — METHYLPREDNISOLONE ACETATE 80 MG: 80 INJECTION, SUSPENSION INTRA-ARTICULAR; INTRALESIONAL; INTRAMUSCULAR; SOFT TISSUE at 11:46

## 2020-01-01 RX ADMIN — LIDOCAINE HYDROCHLORIDE 2 ML: 10 INJECTION, SOLUTION EPIDURAL; INFILTRATION; INTRACAUDAL; PERINEURAL at 08:21

## 2020-01-01 RX ADMIN — KETOROLAC TROMETHAMINE 30 MG: 30 INJECTION, SOLUTION INTRAMUSCULAR at 13:31

## 2020-01-01 RX ADMIN — CEFAZOLIN SODIUM 1 G: 1 INJECTION, SOLUTION INTRAVENOUS at 19:54

## 2020-01-01 RX ADMIN — FOLIC ACID 1 MG: 1 TABLET ORAL at 16:18

## 2020-01-01 RX ADMIN — LIDOCAINE HYDROCHLORIDE 2 ML: 10 INJECTION, SOLUTION EPIDURAL; INFILTRATION; INTRACAUDAL; PERINEURAL at 11:16

## 2020-01-01 RX ADMIN — FOLIC ACID: 5 INJECTION, SOLUTION INTRAMUSCULAR; INTRAVENOUS; SUBCUTANEOUS at 14:09

## 2020-01-01 RX ADMIN — SODIUM CHLORIDE 1000 ML: 9 INJECTION, SOLUTION INTRAVENOUS at 16:05

## 2020-01-01 RX ADMIN — CEFAZOLIN SODIUM 1 G: 1 INJECTION, SOLUTION INTRAVENOUS at 04:20

## 2020-01-01 RX ADMIN — PREGABALIN 50 MG: 25 CAPSULE ORAL at 22:52

## 2020-01-01 RX ADMIN — LIDOCAINE HYDROCHLORIDE 2 ML: 10 INJECTION, SOLUTION EPIDURAL; INFILTRATION; INTRACAUDAL; PERINEURAL at 09:33

## 2020-01-01 RX ADMIN — SODIUM CHLORIDE: 9 INJECTION, SOLUTION INTRAVENOUS at 17:29

## 2020-01-01 RX ADMIN — DEXAMETHASONE SODIUM PHOSPHATE 10 MG: 10 INJECTION, SOLUTION INTRAMUSCULAR; INTRAVENOUS at 11:12

## 2020-01-01 RX ADMIN — LIDOCAINE HYDROCHLORIDE 2 ML: 10 INJECTION, SOLUTION INFILTRATION; PERINEURAL at 08:23

## 2020-01-01 RX ADMIN — ONDANSETRON 4 MG: 4 TABLET, ORALLY DISINTEGRATING ORAL at 04:25

## 2020-01-01 RX ADMIN — HYDROCODONE BITARTRATE AND ACETAMINOPHEN 2 TABLET: 5; 325 TABLET ORAL at 21:00

## 2020-01-01 RX ADMIN — HYDROCODONE BITARTRATE AND ACETAMINOPHEN 2 TABLET: 5; 325 TABLET ORAL at 07:57

## 2020-01-01 RX ADMIN — BUPROPION HYDROCHLORIDE 150 MG: 150 TABLET, EXTENDED RELEASE ORAL at 07:54

## 2020-01-01 RX ADMIN — POTASSIUM CHLORIDE 10 MEQ: 7.46 INJECTION, SOLUTION INTRAVENOUS at 20:24

## 2020-01-01 RX ADMIN — THIAMINE HCL TAB 100 MG 100 MG: 100 TAB at 16:18

## 2020-01-01 RX ADMIN — LIDOCAINE HYDROCHLORIDE 2 ML: 10 INJECTION, SOLUTION EPIDURAL; INFILTRATION; INTRACAUDAL; PERINEURAL at 11:12

## 2020-01-01 RX ADMIN — QUETIAPINE FUMARATE 12.5 MG: 100 TABLET ORAL at 23:13

## 2020-01-01 RX ADMIN — LIDOCAINE HYDROCHLORIDE 2 ML: 10 INJECTION, SOLUTION INFILTRATION; PERINEURAL at 11:12

## 2020-01-01 RX ADMIN — TETANUS TOXOID, REDUCED DIPHTHERIA TOXOID AND ACELLULAR PERTUSSIS VACCINE, ADSORBED 0.5 ML: 5; 2.5; 8; 8; 2.5 SUSPENSION INTRAMUSCULAR at 13:24

## 2020-01-01 RX ADMIN — IOHEXOL 2 ML: 240 INJECTION, SOLUTION INTRATHECAL; INTRAVASCULAR; INTRAVENOUS; ORAL at 11:12

## 2020-01-01 RX ADMIN — LIDOCAINE HYDROCHLORIDE 2 ML: 10 INJECTION, SOLUTION INFILTRATION; PERINEURAL at 08:21

## 2020-01-01 RX ADMIN — IOHEXOL 2 ML: 240 INJECTION, SOLUTION INTRATHECAL; INTRAVASCULAR; INTRAVENOUS; ORAL at 08:22

## 2020-01-01 RX ADMIN — ACETAMINOPHEN 500 MG: 500 TABLET, FILM COATED ORAL at 17:52

## 2020-01-01 RX ADMIN — MAGNESIUM SULFATE HEPTAHYDRATE 4 G: 40 INJECTION, SOLUTION INTRAVENOUS at 17:28

## 2020-01-01 RX ADMIN — LIDOCAINE HYDROCHLORIDE,EPINEPHRINE BITARTRATE 20 ML: 10; .01 INJECTION, SOLUTION INFILTRATION; PERINEURAL at 17:34

## 2020-01-01 RX ADMIN — IOHEXOL 2 ML: 240 INJECTION, SOLUTION INTRATHECAL; INTRAVASCULAR; INTRAVENOUS; ORAL at 11:16

## 2020-01-01 RX ADMIN — PROCHLORPERAZINE EDISYLATE 10 MG: 5 INJECTION INTRAMUSCULAR; INTRAVENOUS at 16:17

## 2020-01-01 RX ADMIN — SODIUM CHLORIDE: 9 INJECTION, SOLUTION INTRAVENOUS at 21:06

## 2020-01-01 RX ADMIN — LIDOCAINE HYDROCHLORIDE 2 ML: 10 INJECTION, SOLUTION INFILTRATION; PERINEURAL at 11:16

## 2020-01-01 RX ADMIN — IPRATROPIUM BROMIDE AND ALBUTEROL SULFATE 3 ML: .5; 3 SOLUTION RESPIRATORY (INHALATION) at 18:27

## 2020-01-01 RX ADMIN — DEXAMETHASONE SODIUM PHOSPHATE 10 MG: 10 INJECTION INTRAMUSCULAR; INTRAVENOUS at 08:23

## 2020-01-01 RX ADMIN — MAGNESIUM SULFATE IN WATER 2 G: 40 INJECTION, SOLUTION INTRAVENOUS at 14:55

## 2020-01-01 ASSESSMENT — ENCOUNTER SYMPTOMS
SPEECH DIFFICULTY: 0
AGITATION: 0
NERVOUS/ANXIOUS: 0
DIZZINESS: 0
BACK PAIN: 0
CHEST TIGHTNESS: 0
MUSCULOSKELETAL NEGATIVE: 1
DIARRHEA: 0
FEVER: 0
HEADACHES: 0
ABDOMINAL PAIN: 1
HEMATURIA: 0
ABDOMINAL PAIN: 0
CHILLS: 0
CONFUSION: 0
FEVER: 0
ADENOPATHY: 0
BRUISES/BLEEDS EASILY: 0
SEIZURES: 0
HEMATURIA: 0
SHORTNESS OF BREATH: 0
VOMITING: 1
BRUISES/BLEEDS EASILY: 0
CHEST TIGHTNESS: 0
COLOR CHANGE: 1
CHILLS: 0
ABDOMINAL PAIN: 0
APPETITE CHANGE: 1
WOUND: 1
WOUND: 1
ADENOPATHY: 0
RESPIRATORY NEGATIVE: 1
CONFUSION: 0
NAUSEA: 1
CONSTIPATION: 0
SHORTNESS OF BREATH: 0
TREMORS: 0
BACK PAIN: 0

## 2020-01-01 ASSESSMENT — MIFFLIN-ST. JEOR
SCORE: 1169.17
SCORE: 1153.29
SCORE: 1200.92
SCORE: 1092.05
SCORE: 1159.64
SCORE: 1201.13

## 2020-01-01 ASSESSMENT — PAIN SCALES - GENERAL
PAINLEVEL: EXTREME PAIN (9)
PAINLEVEL: SEVERE PAIN (7)

## 2020-01-06 ENCOUNTER — OFFICE VISIT (OUTPATIENT)
Dept: ORTHOPEDICS | Facility: OTHER | Age: 60
End: 2020-01-06
Attending: ORTHOPAEDIC SURGERY
Payer: MEDICARE

## 2020-01-06 DIAGNOSIS — Z00.00 ROUTINE GENERAL MEDICAL EXAMINATION AT A HEALTH CARE FACILITY: Primary | ICD-10-CM

## 2020-01-06 PROCEDURE — G0463 HOSPITAL OUTPT CLINIC VISIT: HCPCS

## 2020-01-14 ENCOUNTER — HOSPITAL ENCOUNTER (OUTPATIENT)
Dept: GENERAL RADIOLOGY | Facility: OTHER | Age: 60
Discharge: HOME | End: 2020-01-14
Attending: NURSE PRACTITIONER | Admitting: FAMILY MEDICINE
Payer: MEDICARE

## 2020-01-14 DIAGNOSIS — M54.16 LUMBAR RADICULOPATHY: ICD-10-CM

## 2020-01-14 PROCEDURE — 25000125 ZZHC RX 250: Performed by: RADIOLOGY

## 2020-01-14 PROCEDURE — 25500064 ZZH RX 255 OP 636: Performed by: RADIOLOGY

## 2020-01-14 PROCEDURE — 64483 NJX AA&/STRD TFRM EPI L/S 1: CPT

## 2020-01-14 PROCEDURE — 25000128 H RX IP 250 OP 636: Performed by: RADIOLOGY

## 2020-01-14 RX ORDER — LIDOCAINE HYDROCHLORIDE 10 MG/ML
2 INJECTION, SOLUTION INFILTRATION; PERINEURAL ONCE
Status: COMPLETED | OUTPATIENT
Start: 2020-01-14 | End: 2020-01-14

## 2020-01-14 RX ORDER — DEXAMETHASONE SODIUM PHOSPHATE 10 MG/ML
10 INJECTION, SOLUTION INTRAMUSCULAR; INTRAVENOUS ONCE
Status: COMPLETED | OUTPATIENT
Start: 2020-01-14 | End: 2020-01-14

## 2020-01-14 RX ORDER — METHYLPREDNISOLONE ACETATE 80 MG/ML
80 INJECTION, SUSPENSION INTRA-ARTICULAR; INTRALESIONAL; INTRAMUSCULAR; SOFT TISSUE ONCE
Status: DISCONTINUED | OUTPATIENT
Start: 2020-01-14 | End: 2020-01-15 | Stop reason: HOSPADM

## 2020-01-14 RX ORDER — LIDOCAINE HYDROCHLORIDE 10 MG/ML
2 INJECTION, SOLUTION EPIDURAL; INFILTRATION; INTRACAUDAL; PERINEURAL ONCE
Status: COMPLETED | OUTPATIENT
Start: 2020-01-14 | End: 2020-01-14

## 2020-01-14 RX ADMIN — DEXAMETHASONE SODIUM PHOSPHATE 10 MG: 10 INJECTION, SOLUTION INTRAMUSCULAR; INTRAVENOUS at 14:12

## 2020-01-14 RX ADMIN — LIDOCAINE HYDROCHLORIDE 2 ML: 10 INJECTION, SOLUTION INFILTRATION; PERINEURAL at 14:13

## 2020-01-14 RX ADMIN — LIDOCAINE HYDROCHLORIDE 2 ML: 10 INJECTION, SOLUTION EPIDURAL; INFILTRATION; INTRACAUDAL; PERINEURAL at 14:12

## 2020-01-14 RX ADMIN — IOHEXOL 2 ML: 240 INJECTION, SOLUTION INTRATHECAL; INTRAVASCULAR; INTRAVENOUS; ORAL at 14:12
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
## 2020-02-06 NOTE — PROGRESS NOTES
[facility]          OUTPATIENT PHYSICAL THERAPY ORTHOPEDIC EVALUATION  PLAN OF TREATMENT FOR OUTPATIENT REHABILITATION  (COMPLETE FOR INITIAL CLAIMS ONLY)  Patient's Last Name, First Name, M.I.  YOB: 1960  OmiDeidre  CLAYTON    Provider s Name:  [facility]   Medical Record No.  [id number]   Start of Care Date:  02/05/20   Onset Date:      Type:     _X__PT   ___OT   ___SLP Medical Diagnosis:  M51.36 (ICD-10-CM) - Degeneration of lumbar intervertebral disc. S/P Lumbar fusion L4-5     PT Diagnosis:  Impaired mobility due to long history of chronic LBP, lumbar degenerative disk disease, lumbar stenosis   Visits from SOC:  1      _________________________________________________________________________________  Plan of Treatment/Functional Goals:  strengthening, stretching, neuromuscular re-education, manual therapy  Use MT as needed to improve mobility  Ultrasound, TENS, Electrical stimulation, Cryotherapy  Use as adjunct to planned exercise based PT treatment  Goals  Goal Identifier: Pain  Goal Description: Patient will report she is able to complete her home chores with 30-40% less pain in 8 weeks  Target Date: 05/05/20    Goal Identifier: Strength  Goal Description: Patient will demo the ability to get up from the floor w/o limit due to leg weakness. She will demo full grade 5/5 MMT in all hip and knee group muscles supporting the ability to complete more of her own shopping and cleaning tasks as well as walking stairs and community distance to [facility] etc.   Target Date: 05/05/20    Goal Identifier: Core/pelvic stability  Goal Description: Patient will demo improved core/lumbar/pelvic stability supporting the ability to lift and move items in her apartment, ability to walk on uneven ground without increased back pain  Target Date: 04/05/20               Therapy Frequency:  2 times/Week  Predicted Duration of Therapy Intervention:  12 weeks    Julián  Andrea PT                 I CERTIFY THE NEED FOR THESE SERVICES FURNISHED UNDER        THIS PLAN OF TREATMENT AND WHILE UNDER MY CARE .             Physician Signature               Date    X_____________________________________________________                             Certification Date From:  02/05/20   Certification Date To:  05/05/20    Referring Provider:  Dr. Regalado    Initial Assessment        See Epic Evaluation Start of Care Date: 02/05/20

## 2020-06-12 PROBLEM — Z79.899 MEDICAL CANNABIS USE: Status: ACTIVE | Noted: 2020-01-01

## 2020-06-12 PROBLEM — N28.89 KIDNEY MASS: Status: ACTIVE | Noted: 2020-01-01

## 2020-06-12 PROBLEM — E88.2 DERCUM'S DISEASE: Status: ACTIVE | Noted: 2018-02-14

## 2020-06-12 PROBLEM — G44.86 CERVICOGENIC HEADACHE: Status: ACTIVE | Noted: 2020-01-01

## 2020-06-12 PROBLEM — L03.90 CELLULITIS: Status: ACTIVE | Noted: 2020-01-01

## 2020-06-12 PROBLEM — A49.02 MRSA (METHICILLIN RESISTANT STAPHYLOCOCCUS AUREUS): Status: ACTIVE | Noted: 2017-07-12

## 2020-06-12 PROBLEM — Z84.89 FAMILY HISTORY OF PHYSICAL ABUSE: Status: ACTIVE | Noted: 2018-02-14

## 2020-06-12 PROBLEM — N18.30 CKD (CHRONIC KIDNEY DISEASE) STAGE 3, GFR 30-59 ML/MIN (H): Status: ACTIVE | Noted: 2018-02-14

## 2020-06-12 PROBLEM — L03.116 CELLULITIS OF LEFT LOWER EXTREMITY: Status: ACTIVE | Noted: 2020-01-01

## 2020-06-12 PROBLEM — G43.009 MIGRAINE WITHOUT AURA AND WITHOUT STATUS MIGRAINOSUS, NOT INTRACTABLE: Status: ACTIVE | Noted: 2018-02-14

## 2020-06-12 NOTE — ED AVS SNAPSHOT
[facility]  [address]  [place] [address]  Phone:  [phone]  Fax:  [phone]                                    Deidre Braga   MRN: [id number]    Department:  [facility]   Date of Visit:  6/12/2020           After Visit Summary Signature Page    I have received my discharge instructions, and my questions have been answered. I have discussed any challenges I see with this plan with the nurse or doctor.    ..........................................................................................................................................  Patient/Patient Representative Signature      ..........................................................................................................................................  Patient Representative Print Name and Relationship to Patient    ..................................................               ................................................  Date                                   Time    ..........................................................................................................................................  Reviewed by Signature/Title    ...................................................              ..............................................  Date                                               Time          22EPIC Rev 08/18

## 2020-06-12 NOTE — ED NOTES
Prior to the start of the procedure and with procedural staff participation, I verbally confirmed the patient s identity using two indicators, relevant allergies, that the procedure was appropriate and matched the consent or emergent situation, and that the correct equipment/implants were available. Immediately prior to starting the procedure I conducted the Time Out with the procedural staff and re-confirmed the patient s name, procedure, and site/side. (The Joint Commission universal protocol was followed.)  Yes

## 2020-06-12 NOTE — ED TRIAGE NOTES
"Pt comes into the ER today with a lump to her left lateral thigh that she had lanced yesterday by Dr. Do. Pt reports the swelling is worse today when she woke up. Fevers. No drainage from site. Warmth. Reports back and leg pain \"butt\". Symptoms 1 year to a week and a half?  "

## 2020-06-13 NOTE — PROGRESS NOTES
Pt educated to schedule own follow up with Dr. Do. Pt also give wound care education. Pt states she feels comfortable with both.   Sue Grande RN on 6/13/2020 at 9:46 AM
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
## 2020-06-13 NOTE — PHARMACY - DISCHARGE MEDICATION RECONCILIATION AND EDUCATION
Pharmacy:  Discharge Counseling and Medication Reconciliation    Deidre Braga  [address]   [facility] [id number] [phone] (home)   60 year old female  PCP: Satish Do    Allergies: Sulfa drugs; Baclofen; Gabapentin; Naproxen; and Tramadol    Discharge Counseling:    Pharmacist met with patient today to review the medication portion of the After Visit Summary (with an emphasis on NEW medication) and to address patient's questions/concerns.    Summary of Education: Counseled patient on new medication of Cephalexin, including dosage, administration and possible side effects. Also counseled patient to stop taking Clindamycin.    Materials Provided:  MedCounselor sheets printed from Clinical Pharmacology on: Cephalexin    Discharge Medication Reconciliation:    It has been determined that the patient has an adequate supply of medications available or which can be obtained from the patient's preferred pharmacy, which she has confirmed as: [facility] (next to [place]).    Thank you for the consult.    Elder Feldman RP........June 13, 2020 8:53 AM

## 2020-06-13 NOTE — PLAN OF CARE
"Patient is alert and oriented x 4. Reports left upper thigh pain of a level 7/10. Dilaudid and Norco administered per MAR. Follow up pain level was a 3-4 out of 10. Lung sounds have expiratory wheezes throughout lobes. Respirations 14-16. SpO2 96-98% RA. Redness to left thigh extends beyond marked boarder from yesterday. Redness outlined this AM. Dressing has a small amount of dry serosanguineous drainage. Patient reported nausea. Zofran administered per MAR. Ambulates to the bathroom with a stand-by assist. Voiding without difficulty. Vital signs:  Temp: 97.4  F (36.3  C) Temp src: Tympanic BP: 101/61 Pulse: 80 Heart Rate: 72 Resp: 16 SpO2: 96 % O2 Device: None (Room air)   Height: 160 cm (5' 3\") Weight: 66.7 kg (147 lb)  Estimated body mass index is 26.04 kg/m  as calculated from the following:    Height as of this encounter: 1.6 m (5' 3\").    Weight as of this encounter: 66.7 kg (147 lb).        Anay Hidalgo RN on 6/13/2020 at 5:48 AM    "

## 2020-06-13 NOTE — PROGRESS NOTES
Observation handout given, pt declined obs video stating that she has been here on observation before.   Sue Grande RN on 6/13/2020 at 8:04 AM

## 2020-06-13 NOTE — DISCHARGE SUMMARY
[facility]    Discharge Summary  Hospitalist    Date of Admission:  6/12/2020  Date of Discharge:  6/13/2020  Discharging Provider: Isai Kennedy  Date of Service (when I saw the patient): 06/13/20    Discharge Diagnoses   Principal Problem:    Cellulitis of left lower extremity (6/12/2020)  Active Problems:    COPD (chronic obstructive pulmonary disease) (H) (3/9/2010)    Cellulitis (6/12/2020)      History of Present Illness   Deidre Braga is an 60 year old female who presented with the above.  Patient developed a small pimple and had picked it open, she developed very small boil and subsequently had this I&D in PCP clinic day prior to admit.  No wound culture was obtained.  Over the next 24 hours she developed a surrounding erythema and presented to the ER.     In the ER she had a repeat I&D with no evidence of subcutaneous abscess, again no wound culture was obtained, she was started on IV Ancef and placed on observation for close monitoring overnight.    Hospital Course   Deidre Braga was admitted on 6/12/2020.  The following problems were addressed during her hospitalization:    Cellulitis of the left lower extremity: She had rapid resolution of surrounding erythema, she remained afebrile and white blood cell count remained normal.  Pain was nearly resolved and she will complete a full course of Keflex given high likelihood for MSSA given her rapid clinical response prior MSSA wound culture positivity on 12/20/2019 for similar issue on her right knee.  She had no other changes to her medications and she will follow-up with her PCP on an as-needed basis and was counseled regarding routine wound care management.     Isai Kennedy MD    Significant Results and Procedures   none    Pending Results   These results will be followed up by pcp  Unresulted Labs Ordered in the Past 30 Days of this Admission     Date and Time Order Name Status Description    6/12/2020 1815 Blood culture  Preliminary     6/12/2020 1815 Blood culture Preliminary           Code Status   Full Code       Primary Care Physician   Satish Do    Physical Exam   Temp: 97  F (36.1  C) Temp src: Tympanic BP: 99/59 Pulse: 80 Heart Rate: 92 Resp: 16 SpO2: 97 % O2 Device: None (Room air)    Vitals:    06/12/20 1424 06/12/20 2031 06/13/20 0400   Weight: 63.5 kg (140 lb) 66.7 kg (147 lb 0.8 oz) 66.7 kg (147 lb)     Vital Signs with Ranges  Temp:  [97  F (36.1  C)-98.1  F (36.7  C)] 97  F (36.1  C)  Pulse:  [80] 80  Heart Rate:  [72-92] 92  Resp:  [14-16] 16  BP: ()/(57-73) 99/59  SpO2:  [96 %-98 %] 97 %  I/O last 3 completed shifts:  In: 178 [I.V.:178]  Out: 250 [Urine:250]    Exam on day of discharge:  GENERAL: Talkative, laying in bed, in no apparent distress.  CARDIOVASCULAR: regular rate and rhythm, no murmurs, rubs, or gallops. Normal S1/S2. No lower extremity edema.   RESPIRATORY: clear to auscultation bilaterally, no wheezes, no crackles.  No accessory muscle use or evidence of respiratory distress.   GI: soft, non-tender, non-distended, normoactive bowel sounds.  MUSCULOSKELETAL: warm and well perfused, 2+ dorsalis pedis pulses.    SKIN: Left thigh now resolved surrounding blanchable erythema without pustules or fluctuance, area of I&D without active drainage and no purulence able to be expressed.      Discharge Disposition   Discharged to home  Condition at discharge: Stable    Consultations This Hospital Stay   None    Time Spent on this Encounter   I, Isai Kennedy MD, personally saw the patient today and spent less than or equal to 30 minutes discharging this patient.    Discharge Orders      GENERAL SURG ADULT REFERRAL      Reason for your hospital stay    Cellulitis of the leg     Follow-up and recommended labs and tests     - follow-up with your pcp in 5-7 days for routine post hospital follow-up to ensure your leg is all healed up     Activity    Your activity upon discharge: activity as tolerated     Discharge  Instructions    - take the antibiotic keflex until it is gone to clear up the cellulitis of your leg.  - use tylenol as needed for mild aches and pains     Wound care and dressings    Instructions to care for your wound at home: Apply a dry clean gauze twice daily and prn if it becomes soiled.  You can use a band-aid as well.  It is safe to shower and gently wash the wound, but avoid soaking it or swimming until it is completely healed.     Diet    Follow this diet upon discharge: Orders Placed This Encounter      Regular Diet Adult     Discharge Medications   Discharge Medication List as of 6/13/2020  9:14 AM      START taking these medications    Details   cephALEXin (KEFLEX) 500 MG capsule Take 1 capsule (500 mg) by mouth 4 times daily for 7 days, Disp-28 capsule,R-0, E-Prescribe         CONTINUE these medications which have NOT CHANGED    Details   albuterol (PROAIR HFA) 108 (90 BASE) MCG/ACT inhaler Inhale 1-2 puffs into the lungs every 4 hours as needed for shortness of breath / dyspnea. Should be every 4-6 hours, 1-2 puff, Inhalation, EVERY 4 HOURS PRN Starting 1/4/2012, Until Discontinued, Disp-1 Inhaler, R-2, Fax      alendronate (FOSAMAX) 35 MG tablet Take 35 mg by mouth every 7 days, Historical      budesonide-formoterol (SYMBICORT) 160-4.5 MCG/ACT Inhaler Inhale 2 puffs into the lungs 2 times daily, Historical      buPROPion (WELLBUTRIN XL) 150 MG 24 hr tablet Take 1 tablet (150 mg) by mouth every morning, Disp-30 tablet, R-0, E-Prescribe      Calcium Carb-Cholecalciferol (OYSTER SHELL CALCIUM/VITAMIN D) 500-200 MG-UNIT TABS Take 1 tablet by mouth, Historical      diclofenac (VOLTAREN) 1 % topical gel Place 4 g onto the skin 4 times daily as needed for moderate pain, Historical      DULoxetine (CYMBALTA) 30 MG capsule Take 60 mg by mouth 2 times daily , R-0, Historical      estradiol (ESTRACE) 0.1 MG/GM vaginal cream Place 1 g vaginally twice a weekHistorical      etodolac (LODINE) 300 MG capsule Take  300 mg by mouth 2 times daily as needed , Historical      furosemide (LASIX) 20 MG tablet Take 20 mg by mouth daily, Historical      ipratropium - albuterol 0.5 mg/2.5 mg/3 mL (DUONEB) 0.5-2.5 (3) MG/3ML nebulization Take 1 ampule by nebulization every 6 hours as needed., 1 ampule, Nebulization, EVERY 6 HOURS PRN, Until Discontinued, Historical      Ipratropium-Albuterol (COMBIVENT RESPIMAT)  MCG/ACT inhaler Inhale 1 puff into the lungs 4 times daily , Historical      magnesium oxide (MAG-OX) 400 MG tablet Take 400 mg by mouth, Historical      naltrexone (DEPADE/REVIA) 50 MG tablet Take 50 mg by mouth daily , Historical      omeprazole (PRILOSEC) 20 MG DR capsule Take 20 mg by mouth 2 times daily, Historical      pregabalin (LYRICA) 50 MG capsule Take 1 capsule (50 mg) by mouth 2 times daily, Disp-60 capsule, R-0, E-Prescribe      Prenatal MV-Min-Fe Fum-FA-DHA (PRENATAL 1 PO) Historical      !! QUEtiapine (SEROQUEL) 25 MG tablet Take 0.5 tablets (12.5 mg) by mouth At Bedtime, Disp-30 tablet, R-0, E-Prescribe      !! QUEtiapine (SEROQUEL) 25 MG tablet Take 0.5 tablets (12.5 mg) by mouth 2 times daily as needed (agiatation, delirium), Disp-30 tablet, R-0, Historical       !! - Potential duplicate medications found. Please discuss with provider.      STOP taking these medications       clindamycin (CLEOCIN) 300 MG capsule Comments:   Reason for Stopping:             Allergies   Allergies   Allergen Reactions     Sulfa Drugs Anaphylaxis     Baclofen Swelling     Gabapentin Swelling     Naproxen Swelling     Tramadol Swelling     Data   Most Recent 3 CBC's:  Recent Labs   Lab Test 06/13/20  0530 06/12/20  1537 12/29/19  1300   WBC 4.2 6.0 7.1   HGB 10.8* 11.2* 9.4*   MCV 90 90 91    191 266      Most Recent 3 BMP's:  Recent Labs   Lab Test 06/13/20  0530 06/12/20  1537 12/24/19  0520 12/23/19  0430    138  --  144   POTASSIUM 3.5 3.5 3.7 3.7   CHLORIDE 110* 105  --  111*   CO2 24 25  --  25   BUN 14 17   --  6*   CR 0.95 1.08  --  1.03   ANIONGAP 8 8  --  8   SYD 8.7 9.0  --  9.0    98  --  104     Most Recent 2 LFT's:  Recent Labs   Lab Test 12/17/19  1002 09/10/19  1540   AST 44* 20   ALT 42 17   ALKPHOS 61 43   BILITOTAL 0.4 0.3     Most Recent INR's and Anticoagulation Dosing History:  Anticoagulation Dose History     Recent Dosing and Labs Latest Ref Rng & Units 9/23/2018 12/17/2019    INR 0 - 1.3 1.02 0.97        Most Recent 3 Troponin's:  Recent Labs   Lab Test 09/10/19  1540   TROPI <0.030     Most Recent Cholesterol Panel:  Recent Labs   Lab Test 05/07/12  1546   CHOL 236*      HDL 67   TRIG 200*     Most Recent 6 Bacteria Isolates From Any Culture (See EPIC Reports for Culture Details):  Recent Labs   Lab Test 06/12/20  1537 12/20/19  0854 12/20/19  0815 12/20/19  0545 12/18/19  1350 09/23/18  2155   CULT No growth after 13 hours  No growth after 13 hours Heavy growth  Staphylococcus aureus  * No growth after 6 days No growth after 6 days No growth after 6 days >100,000 colonies/mL  Enterococcus faecalis  *     Most Recent TSH, T4 and A1c Labs:No lab results found.  Results for orders placed or performed during the hospital encounter of 05/18/20   XR Lumbar Sacral Transforaminal Inj Right    Narrative    Procedure: XR LUMBAR SACRAL TRANSFORAMINAL INJ RIGHT    HISTORY: , Chronic pain; Chronic bilateral low back pain with  bilateral sciatica; Chronic bilateral low back pain with bilateral  sciatica; Chronic bilateral low back pain with bilateral sciatica    COMPARISON: MR lumbar spine 12/5/2019.    TECHNIQUE:    The risks and benefits of S1  transforaminal epidural steroid  injection were discussed with the patient, including transient  worsening of radiculopathy, infection, bleeding / epidural hematoma,  inadvertent intrathecal puncture, spinal headache, nerve root injury  and reaction to contrast agents. The patient expressed understanding  and a willingness to proceed. Written informed  consent was obtained.    The patient was placed prone on the fluoroscopy table. The right L5-S1  neural foramen was identified. The overlying skin was marked, prepped  and draped in the standard sterile fashion. Lidocaine was used to  anesthetize the skin entry site. A 22-gauge spinal needle was advanced  under fluoroscopic observation into the neural foramen. Omni 240  contrast containing syringe was attached and there was prompt  visualization of perineural and epidural contrast. Subsequent  administration of 2cc of preservative free 1% lidocaine and 10 mg  dexamethasone resulted in dispersion of the previously seen contrast.  The patient tolerated the procedure well. .1 minutes of fluoro time  were used.    Prior to the procedure, the patient had pain 3 out of 10 in severity.  Shortly after, 90% improved in severity.       Impression    IMPRESSION:     Successful transforaminal epidural steroid injection on the right at  S1. R1.     MARTIN MARROQUIN MD

## 2020-06-13 NOTE — PROGRESS NOTES
" NS ADMISSION NOTE    Patient admitted to room 333 at approximately 2010 via bed from emergency room. Patient was accompanied by nurse.     Verbal SBAR report received from Corrine prior to patient arrival.     Patient ambulated to bed with stand-by assist. Patient alert and oriented X 3. Pain is not well controlled.  Medication(s) being used: Dilaudid. 0-10 Pain Scale: 7. Admission vital signs: Blood pressure 105/57, pulse 80, temperature 97  F (36.1  C), temperature source Tympanic, resp. rate 16, height 1.6 m (5' 3\"), weight 66.7 kg (147 lb 0.8 oz), SpO2 96 %, not currently breastfeeding. Patient was oriented to plan of care, call light, bed controls, tv, telephone, bathroom and visiting hours.     Risk Assessment    The following safety risks were identified during admission: fall. Yellow risk band applied: YES.       Anay Hidalgo RN    "

## 2020-06-13 NOTE — PROGRESS NOTES
[facility] DISCHARGE NOTE    Patient discharged to home at 9:50 AM via ambulation. Accompanied by staff. Discharge instructions reviewed with patient, opportunity offered to ask questions. Prescriptions sent to patients preferred pharmacy. All belongings sent with patient.    Sue Grande RN
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
## 2020-06-23 PROBLEM — L03.115 CELLULITIS OF RIGHT KNEE: Status: RESOLVED | Noted: 2019-12-20 | Resolved: 2020-01-01

## 2020-06-23 PROBLEM — L03.116 CELLULITIS OF LEFT LOWER EXTREMITY: Status: RESOLVED | Noted: 2020-01-01 | Resolved: 2020-01-01

## 2020-06-23 PROBLEM — L03.90 CELLULITIS: Status: RESOLVED | Noted: 2020-01-01 | Resolved: 2020-01-01

## 2020-06-23 NOTE — PROGRESS NOTES
Subjective:  This is a follow up after hospitalization for cellulitis on left lateral thigh.. The patient has  Complaints of persistent tender area.  Almost done with oral antibiotics.    Objective:BP (!) 142/68 (BP Location: Right arm, Patient Position: Sitting, Cuff Size: Adult Regular)   Pulse 92   Temp 96.7  F (35.9  C) (Tympanic)   Resp 16   Wt 65 kg (143 lb 6.4 oz)   BMI 25.40 kg/m    The incisions are healing well without erythema. Some firm subcutaneous tissue beneath drainage sites.     Assessment:   Doing well after cellulitis left lateral thigh. Firm area likely just edematous subcutaneous fat that will improve in a couple of months.    Plan:  Follow-up in couple of months if still bothering her.

## 2020-06-23 NOTE — NURSING NOTE
"Chief Complaint   Patient presents with     Procedure     lump on left thigh       Initial BP (!) 142/68 (BP Location: Right arm, Patient Position: Sitting, Cuff Size: Adult Regular)   Pulse 92   Temp 96.7  F (35.9  C) (Tympanic)   Resp 16   Wt 65 kg (143 lb 6.4 oz)   BMI 25.40 kg/m   Estimated body mass index is 25.4 kg/m  as calculated from the following:    Height as of 6/12/20: 1.6 m (5' 3\").    Weight as of this encounter: 65 kg (143 lb 6.4 oz).  Medication Reconciliation: Completed     Tammy Feldman LPN  "
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
## 2020-07-30 NOTE — ED TRIAGE NOTES
Patient arrived via [facility] EMS. Per report: last ETOH and a fall was 2 days ago. States she is having diarrhea, vomiting and has not been eating since either. Nausea is about 8/10, no medications given besides normal saline. Patient states she wants help and to quit drinking. She has been in treatment recently and fell off the wagon. VS: 137/97, P 98 NSR, 99%, RR 16, Temp. 98.3, BG  143, and no covid symptoms.
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
## 2020-07-31 NOTE — ED PROVIDER NOTES
[facility]  Emergency Department Sign Out Note      Transfer of care from Dr. Walter Barry . See separate Emergency Department note.    Assessment and Plan:  The patient was evaluated by the previous provider for alcohol intoxicaiton. Plan  to go to detox after potassium and magnesium replacment. This was completed and patient stable for discharge to detox         Diagnosis  1. Chronic alcohol abuse    2. Hypomagnesemia    3. Hypokalemia    4. Chronic obstructive pulmonary disease, unspecified COPD type (H)        Disposition:  To Detox       Isis Bruner MD  07/30/20 2292

## 2020-07-31 NOTE — ED NOTES
Called and spoke to leilani at [facility], aware patient is on her way to them and report has already been called. Paperwork sent with the patient and her personal belongings (purse and clothes in one bag). Patient transported with protective transport.

## 2020-07-31 NOTE — ED NOTES
1925: Spoke to Corazon at [facility] regarding them accepting the pt tonight. They state they can take her at time of discharge and nurse to nurse report/information given at this time.
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
## 2020-08-18 NOTE — ED AVS SNAPSHOT
[facility]  [address]  [place] [address]  Phone:  [phone]  Fax:  [phone]                                    Deidre Braga   MRN: [id number]    Department:  [facility]   Date of Visit:  8/18/2020           After Visit Summary Signature Page    I have received my discharge instructions, and my questions have been answered. I have discussed any challenges I see with this plan with the nurse or doctor.    ..........................................................................................................................................  Patient/Patient Representative Signature      ..........................................................................................................................................  Patient Representative Print Name and Relationship to Patient    ..................................................               ................................................  Date                                   Time    ..........................................................................................................................................  Reviewed by Signature/Title    ...................................................              ..............................................  Date                                               Time          22EPIC Rev 08/18
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
## 2020-08-19 NOTE — TELEPHONE ENCOUNTER
:    Received a call from Yolande MORALES From [facility] stating she has concerns with patients drinking and ability at home and wanted to know when  patient comes into the ED or admitted to hospital again for alcohol intoxication could we complete petition for commitment paperwork.    JERRY Guerrero on 8/19/2020 at 3:08 PM

## 2020-08-20 NOTE — PROGRESS NOTES
:     Received a call from Yolande Osullivan RN from [facility] currently providing case management to patient.  She received a voicemail from [facility] nurse that patient will be coming in via ambulance due to alcohol intoxication.  She has concerns regarding patient's chronic alcohol use and repeat ED visits due to this.  She is inquiring about the possibility of a physician supporting a civil commitment due to patient's safety.    Updated ED that patient may potentially be coming in and concerns.    JERRY Pastor on 8/20/2020 at 4:00 PM

## 2020-10-22 NOTE — ED NOTES
Sister calls and update given on pt condition. States pt is scheduled to go to [facility] In[facility] treatment for ETOH, is concerned for pts safety if she is released.

## 2020-10-22 NOTE — ED AVS SNAPSHOT
[facility]  [address]  [place] [address]  Phone: [phone]  Fax: [phone]                                    Deidre Braga   MRN: [id number]    Department: [facility]   Date of Visit: 10/22/2020           After Visit Summary Signature Page    I have received my discharge instructions, and my questions have been answered. I have discussed any challenges I see with this plan with the nurse or doctor.    ..........................................................................................................................................  Patient/Patient Representative Signature      ..........................................................................................................................................  Patient Representative Print Name and Relationship to Patient    ..................................................               ................................................  Date                                   Time    ..........................................................................................................................................  Reviewed by Signature/Title    ...................................................              ..............................................  Date                                               Time          22EPIC Rev 08/18

## 2020-10-22 NOTE — DISCHARGE INSTRUCTIONS
You currently are on a 72-hour hold.  You have been accepted at detox.  Follow-up with PCP as needed or return ED if there are worsening or concerning symptoms.

## 2020-10-22 NOTE — ED NOTES
"Pt O2 drops to 88% when she sleeps, pt states she is a heavy smoker and is suppose to have O2 at home but \"just doesn't\".  Moaning in bed when anyone enters room.  Rests quietly when alone.   "

## 2020-10-22 NOTE — ED NOTES
Pt anxious, calling out from ER bay, needing verbal redirection and reminders to stay calm.  Responds.
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
## 2020-10-23 NOTE — TELEPHONE ENCOUNTER
Triage Call:  Larry calling about pt and where the patient is. There is no consent to communicate on file. Unable to release information. Larry believes pt is at detox and verbalized she will call there as pt is not picking up her phone. Rishabh was given information on how to get a consent to communicate on file for this pt.     Additional Information    Health Information question, no triage required and triager able to answer question    Protocols used: INFORMATION ONLY CALL-A-

## 2020-10-28 NOTE — PROGRESS NOTES
Patient swabbed for COVID-19 testing.  For covid rule out and congregate facility.  Chanda Baeza LPN on 10/28/2020 at 8:41 AM

## 2020-11-21 NOTE — NURSING NOTE
"Chief Complaint   Patient presents with     Musculoskeletal Problem     Patient is here for pain throughout her body that has been ongoing. Patient last had Tylenol at 10:10am and Tizanidine at 5:30am. Patient states she recently had an injection for the pain.    Initial /82   Pulse 114   Temp 96.6  F (35.9  C) (Tympanic)   Resp 18   Ht 1.575 m (5' 2\")   Wt 57.4 kg (126 lb 8 oz)   SpO2 94%   BMI 23.14 kg/m   Estimated body mass index is 23.14 kg/m  as calculated from the following:    Height as of this encounter: 1.575 m (5' 2\").    Weight as of this encounter: 57.4 kg (126 lb 8 oz).  Medication Reconciliation: complete    Gerri Breen LPN  "
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
## 2021-01-01 ENCOUNTER — OFFICE VISIT (OUTPATIENT)
Dept: FAMILY MEDICINE | Facility: OTHER | Age: 61
End: 2021-01-01
Attending: PHYSICIAN ASSISTANT
Payer: MEDICARE

## 2021-01-01 VITALS
SYSTOLIC BLOOD PRESSURE: 112 MMHG | DIASTOLIC BLOOD PRESSURE: 58 MMHG | HEART RATE: 96 BPM | TEMPERATURE: 96.4 F | HEIGHT: 62 IN | OXYGEN SATURATION: 97 % | WEIGHT: 119.2 LBS | BODY MASS INDEX: 21.94 KG/M2 | RESPIRATION RATE: 14 BRPM

## 2021-01-01 DIAGNOSIS — K13.0 CRACKED LIP: Primary | ICD-10-CM

## 2021-01-01 PROCEDURE — G0463 HOSPITAL OUTPT CLINIC VISIT: HCPCS

## 2021-01-01 PROCEDURE — 99212 OFFICE O/P EST SF 10 MIN: CPT | Performed by: PHYSICIAN ASSISTANT

## 2021-01-01 SDOH — HEALTH STABILITY: MENTAL HEALTH: HOW OFTEN DO YOU HAVE 6 OR MORE DRINKS ON ONE OCCASION?: NOT ASKED

## 2021-01-01 SDOH — HEALTH STABILITY: MENTAL HEALTH: HOW MANY STANDARD DRINKS CONTAINING ALCOHOL DO YOU HAVE ON A TYPICAL DAY?: NOT ASKED

## 2021-01-01 SDOH — HEALTH STABILITY: MENTAL HEALTH: HOW OFTEN DO YOU HAVE A DRINK CONTAINING ALCOHOL?: NOT ASKED

## 2021-01-01 ASSESSMENT — MIFFLIN-ST. JEOR: SCORE: 1058.94

## 2021-01-05 PROBLEM — F32.4 MAJOR DEPRESSIVE DISORDER WITH SINGLE EPISODE, IN PARTIAL REMISSION (H): Status: ACTIVE | Noted: 2019-05-06

## 2021-01-05 PROBLEM — G57.31 NEUROPATHY OF RIGHT PERONEAL NERVE: Status: ACTIVE | Noted: 2018-02-14

## 2021-01-05 PROBLEM — Z79.899 POLYPHARMACY: Status: ACTIVE | Noted: 2020-01-01

## 2021-01-05 PROBLEM — F51.04 PSYCHOPHYSIOLOGICAL INSOMNIA: Status: ACTIVE | Noted: 2018-09-13

## 2021-01-05 PROBLEM — J43.1 PANLOBULAR EMPHYSEMA (H): Status: ACTIVE | Noted: 2018-02-14

## 2021-01-05 PROBLEM — Z71.89 OTHER SPECIFIED COUNSELING: Chronic | Status: ACTIVE | Noted: 2018-05-08

## 2021-01-05 PROBLEM — K02.9 DENTAL CARIES: Status: ACTIVE | Noted: 2019-05-06

## 2021-01-05 PROBLEM — M62.838 MUSCLE SPASMS OF NECK: Status: ACTIVE | Noted: 2019-05-06

## 2021-01-05 PROBLEM — M54.16 LUMBAR RADICULOPATHY: Status: ACTIVE | Noted: 2018-02-14

## 2021-01-05 PROBLEM — M47.817 LUMBOSACRAL SPONDYLOSIS WITHOUT MYELOPATHY: Status: ACTIVE | Noted: 2019-11-06

## 2021-01-05 PROBLEM — Z98.890 HISTORY OF LUMBAR SURGERY: Status: ACTIVE | Noted: 2018-02-14

## 2021-01-05 NOTE — NURSING NOTE
Patient presents to clinic with concerns about cracks in her lips that she has been dealing with for over a year.  Jayashree Miles LPN ....................  1/5/2021   10:44 AM
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
## 2021-05-10 NOTE — DISCHARGE INSTRUCTIONS

## 2021-07-22 NOTE — ED NOTES
"Pt assisted to BSC to void, when going back onto cot, pt asks why she's in the ER.  Informed pt of welfare check by police and pt's comment about drinking 1/2 of a bottle of Listerine, pt denies drinking the Listerine, states, \"I don't drink it.  Oh my God.  I can't believe I said that.  My dentures don't fit well so I rinse my mouth out with Listerine every 30 minutes because I get sores on my gums.\"    "
Attempted to contact pts friend, Sheela, x 3 to transport pt and get pt a key for pts residence.  Message left, will continue to attempt to reach.   
Care of patient turned over to myself at shift change.  Please review note by VALDO Solis.  She has slept and is now clinically sober enough to function well and will be discharged to home.     Alfredo Villarreal MD  08/19/20 0419    
Care patient turned over to myself at change of shift.  Please see note from Bang Solis for more details.  Plan was to just let her stay here until she seems safe to go home from an intoxication standpoint and she was able to get a ride.  We were eventually able to get her a ride and she seemed much improved.  Discharged home in stable condition.    (F10.730) Alcoholic intoxication without complication (H)           Alfredo Villarreal MD  09/09/20 8459    
[facility] called again for bed availability. No bed availability at this time. RAFAEL Suazo updated and notified.   
[facility] called and they state no bed availability at this time. Per [facility], call back in one hour to see if there are any bed openings.   
Ice pack provided to left neck for pain.  Ice helps.  Takes tylenol at home.  Pain I chronic.  
PA at bedside.  
Patient more alert and coherent. Is oriented x 3. She knows she is in the ER due to ETOH intoxication. She is currently eating a sandwich and tolerating well. Gait is more steady and tolerated well getting up to the BSC.   
Patient placed her call light on and asked to go to the bathroom.  She ambulated with nurse to bathroom, continent.  She is alert and oriented.  CIWA's are unremarkable except a complaint of a headache, which is chronic with her neck pain.  Tylenol administered.  She is now resting in her room, watching TV, behaviors appropriate.  Will continue to monitor.  
Patient pulled out IV. House sup notified of need for sitter.   
Patient resting in bed with her HOB flat.  Ice pack to left side of neck, which she states helps.  Sitter at bedside.  
Patient's oxygenation is in the upper 80's.  She is laying flat and on her side.  Elevated her HOB and she is now in the low 90's.  She states that she has orders to wear oxygen at home, but she does not use it.    
Pt to CT.   
Sheela calls back, states will be in to  pt in 20 minutes.   
Sitter at bedside.  Continues to have headache.  Lights dimmed.  Up to commode.  
Sitter at beside, no needs verbalized.  
Sitter removed from bedside, pt will be frequently visualized, bed alarm initiated, call light within reach, pt educated on use and need to call for assistance.   
Sera Davenport)  Urology  [address] [place] [address]  Phone: [phone]  Fax: [phone]  Follow Up Time:     Anthony Crowley J  CARDIOVASCULAR DISEASE  [address] [place] [address]  Phone: [phone]  Fax: [phone]  Follow Up Time:

## 2021-10-26 LAB — PREGABALIN SERPL-MCNC: 39.3 UG/ML

## 2022-10-11 NOTE — ED TRIAGE NOTES
Patient to ER via EMS, she reports leg pain she localizes to R groin area that is worse with ambulation.    [facility] Pregnancy And Lactation Text: There is insufficient data to evaluate drug-associated risk for major birth defects, miscarriage, or other adverse maternal or fetal outcomes.  There is a pregnancy registry that monitors pregnancy outcomes in pregnant persons exposed to the medication during pregnancy.  It is unknown if this medication is excreted in breast milk.  Do not breastfeed during treatment and for about 4 weeks after the last dose.

## (undated) DEVICE — PACK MAJOR EXTREMITY SOP15MEFCA

## (undated) DEVICE — TUBING SUCTION 10'X3/16" N510

## (undated) DEVICE — DRSG GAUZE 4X4" TRAY 6939

## (undated) DEVICE — SU MONOCRYL 4-0 PS-2 27" UND Y426H

## (undated) DEVICE — Device

## (undated) DEVICE — DRSG XEROFORM 1X8"

## (undated) DEVICE — SU VICRYL 2-0 CT-2 27" J333H

## (undated) DEVICE — SU ETHILON 4-0 FS-2 18" 662G

## (undated) RX ORDER — KETOROLAC TROMETHAMINE 30 MG/ML
INJECTION, SOLUTION INTRAMUSCULAR; INTRAVENOUS
Status: DISPENSED
Start: 2019-09-10

## (undated) RX ORDER — MULTIPLE VITAMINS W/ MINERALS TAB 9MG-400MCG
TAB ORAL
Status: DISPENSED
Start: 2020-01-01

## (undated) RX ORDER — LIDOCAINE HYDROCHLORIDE 20 MG/ML
INJECTION, SOLUTION EPIDURAL; INFILTRATION; INTRACAUDAL; PERINEURAL
Status: DISPENSED
Start: 2019-06-03

## (undated) RX ORDER — BUPIVACAINE HYDROCHLORIDE AND EPINEPHRINE 5; 5 MG/ML; UG/ML
INJECTION, SOLUTION EPIDURAL; INTRACAUDAL; PERINEURAL
Status: DISPENSED
Start: 2019-06-03

## (undated) RX ORDER — SODIUM CHLORIDE 9 MG/ML
INJECTION, SOLUTION INTRAVENOUS
Status: DISPENSED
Start: 2019-12-17

## (undated) RX ORDER — KETOROLAC TROMETHAMINE 30 MG/ML
INJECTION, SOLUTION INTRAMUSCULAR; INTRAVENOUS
Status: DISPENSED
Start: 2019-06-03

## (undated) RX ORDER — KETOROLAC TROMETHAMINE 30 MG/ML
INJECTION, SOLUTION INTRAMUSCULAR; INTRAVENOUS
Status: DISPENSED
Start: 2020-01-01

## (undated) RX ORDER — CEFAZOLIN SODIUM 1 G/3ML
INJECTION, POWDER, FOR SOLUTION INTRAMUSCULAR; INTRAVENOUS
Status: DISPENSED
Start: 2020-01-01

## (undated) RX ORDER — NEOSTIGMINE METHYLSULFATE 0.5 MG/ML
INJECTION INTRAVENOUS
Status: DISPENSED
Start: 2019-06-03

## (undated) RX ORDER — LANOLIN ALCOHOL/MO/W.PET/CERES
CREAM (GRAM) TOPICAL
Status: DISPENSED
Start: 2020-01-01

## (undated) RX ORDER — FOLIC ACID 1 MG/1
TABLET ORAL
Status: DISPENSED
Start: 2020-01-01

## (undated) RX ORDER — SODIUM CHLORIDE 9 MG/ML
INJECTION, SOLUTION INTRAVENOUS
Status: DISPENSED
Start: 2020-01-01

## (undated) RX ORDER — PHENYLEPHRINE HCL IN 0.9% NACL 1 MG/10 ML
SYRINGE (ML) INTRAVENOUS
Status: DISPENSED
Start: 2019-06-03

## (undated) RX ORDER — KETAMINE HCL IN 0.9 % NACL 50 MG/5 ML
SYRINGE (ML) INTRAVENOUS
Status: DISPENSED
Start: 2019-12-29

## (undated) RX ORDER — ACETAMINOPHEN 500 MG
TABLET ORAL
Status: DISPENSED
Start: 2020-01-01

## (undated) RX ORDER — LIDOCAINE HYDROCHLORIDE AND EPINEPHRINE 10; 10 MG/ML; UG/ML
INJECTION, SOLUTION INFILTRATION; PERINEURAL
Status: DISPENSED
Start: 2020-01-01

## (undated) RX ORDER — POTASSIUM CHLORIDE 7.45 MG/ML
INJECTION INTRAVENOUS
Status: DISPENSED
Start: 2020-01-01

## (undated) RX ORDER — IPRATROPIUM BROMIDE AND ALBUTEROL SULFATE 2.5; .5 MG/3ML; MG/3ML
SOLUTION RESPIRATORY (INHALATION)
Status: DISPENSED
Start: 2020-01-01

## (undated) RX ORDER — ACETAMINOPHEN 325 MG/1
TABLET ORAL
Status: DISPENSED
Start: 2020-01-01

## (undated) RX ORDER — POTASSIUM CHLORIDE 1500 MG/1
TABLET, EXTENDED RELEASE ORAL
Status: DISPENSED
Start: 2020-01-01

## (undated) RX ORDER — CEFAZOLIN SODIUM 2 G/100ML
INJECTION, SOLUTION INTRAVENOUS
Status: DISPENSED
Start: 2019-06-03

## (undated) RX ORDER — ONDANSETRON 2 MG/ML
INJECTION INTRAMUSCULAR; INTRAVENOUS
Status: DISPENSED
Start: 2018-12-21

## (undated) RX ORDER — FENTANYL CITRATE 50 UG/ML
INJECTION, SOLUTION INTRAMUSCULAR; INTRAVENOUS
Status: DISPENSED
Start: 2019-06-03

## (undated) RX ORDER — ONDANSETRON 4 MG/1
TABLET, ORALLY DISINTEGRATING ORAL
Status: DISPENSED
Start: 2020-01-01

## (undated) RX ORDER — KETOROLAC TROMETHAMINE 15 MG/ML
INJECTION, SOLUTION INTRAMUSCULAR; INTRAVENOUS
Status: DISPENSED
Start: 2018-12-21

## (undated) RX ORDER — KETOROLAC TROMETHAMINE 30 MG/ML
INJECTION, SOLUTION INTRAMUSCULAR; INTRAVENOUS
Status: DISPENSED
Start: 2019-11-22

## (undated) RX ORDER — ALBUTEROL SULFATE 0.83 MG/ML
SOLUTION RESPIRATORY (INHALATION)
Status: DISPENSED
Start: 2019-12-17

## (undated) RX ORDER — LIDOCAINE HYDROCHLORIDE 10 MG/ML
INJECTION, SOLUTION INFILTRATION; PERINEURAL
Status: DISPENSED
Start: 2020-01-01

## (undated) RX ORDER — HYDROMORPHONE HYDROCHLORIDE 1 MG/ML
INJECTION, SOLUTION INTRAMUSCULAR; INTRAVENOUS; SUBCUTANEOUS
Status: DISPENSED
Start: 2020-01-01

## (undated) RX ORDER — KETAMINE HYDROCHLORIDE 50 MG/ML
INJECTION, SOLUTION INTRAMUSCULAR; INTRAVENOUS
Status: DISPENSED
Start: 2019-06-03

## (undated) RX ORDER — CEFAZOLIN SODIUM 1 G/50ML
INJECTION, SOLUTION INTRAVENOUS
Status: DISPENSED
Start: 2020-01-01

## (undated) RX ORDER — LORAZEPAM 1 MG/1
TABLET ORAL
Status: DISPENSED
Start: 2020-01-01

## (undated) RX ORDER — DOXYCYCLINE 100 MG/1
CAPSULE ORAL
Status: DISPENSED
Start: 2018-09-23

## (undated) RX ORDER — PROPOFOL 10 MG/ML
INJECTION, EMULSION INTRAVENOUS
Status: DISPENSED
Start: 2019-06-03

## (undated) RX ORDER — QUETIAPINE FUMARATE 25 MG/1
TABLET, FILM COATED ORAL
Status: DISPENSED
Start: 2020-01-01

## (undated) RX ORDER — MAGNESIUM SULFATE HEPTAHYDRATE 40 MG/ML
INJECTION, SOLUTION INTRAVENOUS
Status: DISPENSED
Start: 2020-01-01

## (undated) RX ORDER — METHYLPREDNISOLONE ACETATE 80 MG/ML
INJECTION, SUSPENSION INTRA-ARTICULAR; INTRALESIONAL; INTRAMUSCULAR; SOFT TISSUE
Status: DISPENSED
Start: 2020-01-01

## (undated) RX ORDER — DEXAMETHASONE SODIUM PHOSPHATE 4 MG/ML
INJECTION, SOLUTION INTRA-ARTICULAR; INTRALESIONAL; INTRAMUSCULAR; INTRAVENOUS; SOFT TISSUE
Status: DISPENSED
Start: 2019-06-03

## (undated) RX ORDER — GLYCOPYRROLATE 0.2 MG/ML
INJECTION, SOLUTION INTRAMUSCULAR; INTRAVENOUS
Status: DISPENSED
Start: 2019-06-03

## (undated) RX ORDER — DEXAMETHASONE SODIUM PHOSPHATE 10 MG/ML
INJECTION, SOLUTION INTRAMUSCULAR; INTRAVENOUS
Status: DISPENSED
Start: 2020-01-14

## (undated) RX ORDER — ACETAMINOPHEN 650 MG
TABLET, EXTENDED RELEASE ORAL
Status: DISPENSED
Start: 2019-12-17

## (undated) RX ORDER — SODIUM CHLORIDE 9 MG/ML
INJECTION, SOLUTION INTRAVENOUS
Status: DISPENSED
Start: 2019-09-10

## (undated) RX ORDER — ACETAMINOPHEN 500 MG
TABLET ORAL
Status: DISPENSED
Start: 2019-12-30

## (undated) RX ORDER — SODIUM CHLORIDE 9 MG/ML
INJECTION, SOLUTION INTRAVENOUS
Status: DISPENSED
Start: 2018-12-21

## (undated) RX ORDER — CYCLOBENZAPRINE HCL 10 MG
TABLET ORAL
Status: DISPENSED
Start: 2019-11-22

## (undated) RX ORDER — ONDANSETRON 2 MG/ML
INJECTION INTRAMUSCULAR; INTRAVENOUS
Status: DISPENSED
Start: 2019-06-03

## (undated) RX ORDER — LIDOCAINE HYDROCHLORIDE AND EPINEPHRINE 10; 10 MG/ML; UG/ML
INJECTION, SOLUTION INFILTRATION; PERINEURAL
Status: DISPENSED
Start: 2019-12-17

## (undated) RX ORDER — OXYCODONE AND ACETAMINOPHEN 5; 325 MG/1; MG/1
TABLET ORAL
Status: DISPENSED
Start: 2019-11-22